# Patient Record
Sex: MALE | Employment: OTHER
[De-identification: names, ages, dates, MRNs, and addresses within clinical notes are randomized per-mention and may not be internally consistent; named-entity substitution may affect disease eponyms.]

---

## 2024-10-01 LAB — HBA1C MFR BLD: 9.3 % (ref ?–5.8)

## 2024-10-07 ENCOUNTER — OFFICE VISIT (OUTPATIENT)
Dept: MEDICAL GROUP | Facility: CLINIC | Age: 74
End: 2024-10-07
Payer: MEDICARE

## 2024-10-07 VITALS
WEIGHT: 175 LBS | SYSTOLIC BLOOD PRESSURE: 128 MMHG | DIASTOLIC BLOOD PRESSURE: 76 MMHG | BODY MASS INDEX: 25.05 KG/M2 | TEMPERATURE: 97.6 F | OXYGEN SATURATION: 96 % | HEART RATE: 71 BPM | HEIGHT: 70 IN

## 2024-10-07 DIAGNOSIS — Z12.11 SCREENING FOR COLORECTAL CANCER: ICD-10-CM

## 2024-10-07 DIAGNOSIS — Z12.12 SCREENING FOR COLORECTAL CANCER: ICD-10-CM

## 2024-10-07 DIAGNOSIS — I50.9 HEART FAILURE, UNSPECIFIED HF CHRONICITY, UNSPECIFIED HEART FAILURE TYPE (HCC): ICD-10-CM

## 2024-10-07 DIAGNOSIS — R97.20 ELEVATED PSA: ICD-10-CM

## 2024-10-07 DIAGNOSIS — L97.529 DIABETIC ULCER OF TOE OF LEFT FOOT ASSOCIATED WITH TYPE 2 DIABETES MELLITUS, UNSPECIFIED ULCER STAGE (HCC): ICD-10-CM

## 2024-10-07 DIAGNOSIS — Z13.228 SCREENING FOR METABOLIC DISORDER: ICD-10-CM

## 2024-10-07 DIAGNOSIS — E11.621 DIABETIC ULCER OF TOE OF LEFT FOOT ASSOCIATED WITH TYPE 2 DIABETES MELLITUS, UNSPECIFIED ULCER STAGE (HCC): ICD-10-CM

## 2024-10-07 DIAGNOSIS — R26.81 UNSTEADY GAIT: ICD-10-CM

## 2024-10-07 DIAGNOSIS — F33.9 RECURRENT MAJOR DEPRESSIVE DISORDER, REMISSION STATUS UNSPECIFIED (HCC): ICD-10-CM

## 2024-10-07 DIAGNOSIS — E08.65 DIABETES MELLITUS DUE TO UNDERLYING CONDITION, UNCONTROLLED, WITH HYPERGLYCEMIA (HCC): ICD-10-CM

## 2024-10-07 PROCEDURE — 99205 OFFICE O/P NEW HI 60 MIN: CPT

## 2024-10-07 PROCEDURE — 3078F DIAST BP <80 MM HG: CPT

## 2024-10-07 PROCEDURE — 3074F SYST BP LT 130 MM HG: CPT

## 2024-10-07 RX ORDER — SERTRALINE HYDROCHLORIDE 25 MG/1
25 TABLET, FILM COATED ORAL DAILY
Qty: 30 TABLET | Refills: 11 | Status: SHIPPED | OUTPATIENT
Start: 2024-10-07

## 2024-10-07 ASSESSMENT — PATIENT HEALTH QUESTIONNAIRE - PHQ9
5. POOR APPETITE OR OVEREATING: 1 - SEVERAL DAYS
CLINICAL INTERPRETATION OF PHQ2 SCORE: 3
SUM OF ALL RESPONSES TO PHQ QUESTIONS 1-9: 12

## 2024-10-08 ENCOUNTER — TELEPHONE (OUTPATIENT)
Dept: HEALTH INFORMATION MANAGEMENT | Facility: OTHER | Age: 74
End: 2024-10-08
Payer: MEDICARE

## 2024-10-10 ENCOUNTER — APPOINTMENT (OUTPATIENT)
Dept: MEDICAL GROUP | Facility: CLINIC | Age: 74
End: 2024-10-10
Payer: MEDICARE

## 2024-10-10 ENCOUNTER — HOME HEALTH ADMISSION (OUTPATIENT)
Dept: HOME HEALTH SERVICES | Facility: HOME HEALTHCARE | Age: 74
End: 2024-10-10
Payer: MEDICARE

## 2024-10-10 ENCOUNTER — TELEPHONE (OUTPATIENT)
Dept: CARDIOLOGY | Facility: MEDICAL CENTER | Age: 74
End: 2024-10-10

## 2024-10-10 DIAGNOSIS — I50.9 HEART FAILURE, UNSPECIFIED HF CHRONICITY, UNSPECIFIED HEART FAILURE TYPE (HCC): ICD-10-CM

## 2024-10-10 DIAGNOSIS — R26.81 UNSTEADY GAIT: ICD-10-CM

## 2024-10-11 ENCOUNTER — OFFICE VISIT (OUTPATIENT)
Dept: WOUND CARE | Facility: MEDICAL CENTER | Age: 74
End: 2024-10-11
Payer: MEDICARE

## 2024-10-11 ENCOUNTER — APPOINTMENT (OUTPATIENT)
Dept: CARDIOLOGY | Facility: MEDICAL CENTER | Age: 74
End: 2024-10-11
Payer: MEDICARE

## 2024-10-11 ENCOUNTER — HOSPITAL ENCOUNTER (OUTPATIENT)
Dept: RADIOLOGY | Facility: MEDICAL CENTER | Age: 74
End: 2024-10-11
Attending: NURSE PRACTITIONER
Payer: MEDICARE

## 2024-10-11 ENCOUNTER — HOSPITAL ENCOUNTER (OUTPATIENT)
Facility: MEDICAL CENTER | Age: 74
End: 2024-10-11
Attending: NURSE PRACTITIONER
Payer: MEDICARE

## 2024-10-11 VITALS
HEART RATE: 85 BPM | TEMPERATURE: 97.3 F | DIASTOLIC BLOOD PRESSURE: 60 MMHG | RESPIRATION RATE: 18 BRPM | SYSTOLIC BLOOD PRESSURE: 110 MMHG

## 2024-10-11 DIAGNOSIS — L97.522 DIABETIC ULCER OF TOE OF LEFT FOOT ASSOCIATED WITH TYPE 2 DIABETES MELLITUS, WITH FAT LAYER EXPOSED (HCC): Primary | ICD-10-CM

## 2024-10-11 DIAGNOSIS — T14.8XXA WOUND INFECTION: ICD-10-CM

## 2024-10-11 DIAGNOSIS — E11.621 DIABETIC ULCER OF TOE OF LEFT FOOT ASSOCIATED WITH TYPE 2 DIABETES MELLITUS, WITH FAT LAYER EXPOSED (HCC): ICD-10-CM

## 2024-10-11 DIAGNOSIS — E11.65 POORLY CONTROLLED TYPE 2 DIABETES MELLITUS WITH PERIPHERAL NEUROPATHY (HCC): ICD-10-CM

## 2024-10-11 DIAGNOSIS — E11.42 POORLY CONTROLLED TYPE 2 DIABETES MELLITUS WITH PERIPHERAL NEUROPATHY (HCC): ICD-10-CM

## 2024-10-11 DIAGNOSIS — E11.621 DIABETIC ULCER OF TOE OF LEFT FOOT ASSOCIATED WITH TYPE 2 DIABETES MELLITUS, WITH FAT LAYER EXPOSED (HCC): Primary | ICD-10-CM

## 2024-10-11 DIAGNOSIS — L97.522 DIABETIC ULCER OF TOE OF LEFT FOOT ASSOCIATED WITH TYPE 2 DIABETES MELLITUS, WITH FAT LAYER EXPOSED (HCC): ICD-10-CM

## 2024-10-11 DIAGNOSIS — L08.9 WOUND INFECTION: ICD-10-CM

## 2024-10-11 LAB
GRAM STN SPEC: NORMAL
SIGNIFICANT IND 70042: NORMAL
SITE SITE: NORMAL
SOURCE SOURCE: NORMAL

## 2024-10-11 PROCEDURE — 11042 DBRDMT SUBQ TIS 1ST 20SQCM/<: CPT

## 2024-10-11 PROCEDURE — 3074F SYST BP LT 130 MM HG: CPT | Performed by: NURSE PRACTITIONER

## 2024-10-11 PROCEDURE — 87077 CULTURE AEROBIC IDENTIFY: CPT

## 2024-10-11 PROCEDURE — 99215 OFFICE O/P EST HI 40 MIN: CPT | Mod: 25 | Performed by: NURSE PRACTITIONER

## 2024-10-11 PROCEDURE — 73660 X-RAY EXAM OF TOE(S): CPT | Mod: LT

## 2024-10-11 PROCEDURE — 99214 OFFICE O/P EST MOD 30 MIN: CPT

## 2024-10-11 PROCEDURE — 11042 DBRDMT SUBQ TIS 1ST 20SQCM/<: CPT | Performed by: NURSE PRACTITIONER

## 2024-10-11 PROCEDURE — 87070 CULTURE OTHR SPECIMN AEROBIC: CPT

## 2024-10-11 PROCEDURE — 87186 SC STD MICRODIL/AGAR DIL: CPT

## 2024-10-11 PROCEDURE — 3078F DIAST BP <80 MM HG: CPT | Performed by: NURSE PRACTITIONER

## 2024-10-11 PROCEDURE — 87205 SMEAR GRAM STAIN: CPT

## 2024-10-11 RX ORDER — ATORVASTATIN CALCIUM 20 MG/1
40 TABLET, FILM COATED ORAL DAILY
COMMUNITY
End: 2024-10-22 | Stop reason: SDUPTHER

## 2024-10-11 RX ORDER — GLIPIZIDE 2.5 MG/1
2.5 TABLET, EXTENDED RELEASE ORAL DAILY
COMMUNITY

## 2024-10-11 RX ORDER — FUROSEMIDE 40 MG/1
40 TABLET ORAL DAILY
COMMUNITY
End: 2024-10-22

## 2024-10-11 RX ORDER — PANTOPRAZOLE SODIUM 40 MG/1
40 TABLET, DELAYED RELEASE ORAL DAILY
COMMUNITY
End: 2024-10-22

## 2024-10-11 RX ORDER — CARVEDILOL 3.12 MG/1
3.12 TABLET ORAL 2 TIMES DAILY
COMMUNITY

## 2024-10-11 RX ORDER — LISINOPRIL 5 MG/1
2.5 TABLET ORAL DAILY
COMMUNITY
End: 2024-10-18

## 2024-10-11 RX ORDER — SENNOSIDES A AND B 8.6 MG/1
17.2 TABLET, FILM COATED ORAL DAILY
COMMUNITY

## 2024-10-12 ASSESSMENT — ENCOUNTER SYMPTOMS
SENSORY CHANGE: 1
MEMORY LOSS: 1
WEAKNESS: 1

## 2024-10-13 LAB
BACTERIA WND AEROBE CULT: ABNORMAL
BACTERIA WND AEROBE CULT: ABNORMAL
GRAM STN SPEC: ABNORMAL
SIGNIFICANT IND 70042: ABNORMAL
SITE SITE: ABNORMAL
SOURCE SOURCE: ABNORMAL

## 2024-10-14 ENCOUNTER — DOCUMENTATION (OUTPATIENT)
Dept: WOUND CARE | Facility: MEDICAL CENTER | Age: 74
End: 2024-10-14
Payer: MEDICARE

## 2024-10-14 DIAGNOSIS — L97.522 DIABETIC ULCER OF TOE OF LEFT FOOT ASSOCIATED WITH TYPE 2 DIABETES MELLITUS, WITH FAT LAYER EXPOSED (HCC): ICD-10-CM

## 2024-10-14 DIAGNOSIS — E11.42 POORLY CONTROLLED TYPE 2 DIABETES MELLITUS WITH PERIPHERAL NEUROPATHY (HCC): ICD-10-CM

## 2024-10-14 DIAGNOSIS — E11.621 DIABETIC ULCER OF TOE OF LEFT FOOT ASSOCIATED WITH TYPE 2 DIABETES MELLITUS, WITH FAT LAYER EXPOSED (HCC): ICD-10-CM

## 2024-10-14 DIAGNOSIS — T14.8XXA WOUND INFECTION: ICD-10-CM

## 2024-10-14 DIAGNOSIS — E11.65 POORLY CONTROLLED TYPE 2 DIABETES MELLITUS WITH PERIPHERAL NEUROPATHY (HCC): ICD-10-CM

## 2024-10-14 DIAGNOSIS — L08.9 WOUND INFECTION: ICD-10-CM

## 2024-10-15 ENCOUNTER — OFFICE VISIT (OUTPATIENT)
Dept: MEDICAL GROUP | Facility: CLINIC | Age: 74
End: 2024-10-15
Payer: MEDICARE

## 2024-10-15 ENCOUNTER — APPOINTMENT (OUTPATIENT)
Dept: WOUND CARE | Facility: MEDICAL CENTER | Age: 74
End: 2024-10-15
Payer: MEDICARE

## 2024-10-15 VITALS
BODY MASS INDEX: 23.77 KG/M2 | SYSTOLIC BLOOD PRESSURE: 122 MMHG | OXYGEN SATURATION: 94 % | HEART RATE: 67 BPM | DIASTOLIC BLOOD PRESSURE: 64 MMHG | TEMPERATURE: 98.1 F | WEIGHT: 166 LBS | HEIGHT: 70 IN | RESPIRATION RATE: 20 BRPM

## 2024-10-15 DIAGNOSIS — L97.529 DIABETIC ULCER OF TOE OF LEFT FOOT ASSOCIATED WITH TYPE 2 DIABETES MELLITUS, UNSPECIFIED ULCER STAGE (HCC): ICD-10-CM

## 2024-10-15 DIAGNOSIS — E11.42 POORLY CONTROLLED TYPE 2 DIABETES MELLITUS WITH PERIPHERAL NEUROPATHY (HCC): ICD-10-CM

## 2024-10-15 DIAGNOSIS — E11.65 POORLY CONTROLLED TYPE 2 DIABETES MELLITUS WITH PERIPHERAL NEUROPATHY (HCC): ICD-10-CM

## 2024-10-15 DIAGNOSIS — Z23 NEED FOR VACCINATION: ICD-10-CM

## 2024-10-15 DIAGNOSIS — R97.20 ELEVATED PSA: ICD-10-CM

## 2024-10-15 DIAGNOSIS — E11.621 DIABETIC ULCER OF TOE OF LEFT FOOT ASSOCIATED WITH TYPE 2 DIABETES MELLITUS, UNSPECIFIED ULCER STAGE (HCC): ICD-10-CM

## 2024-10-15 DIAGNOSIS — I50.9 HEART FAILURE, UNSPECIFIED HF CHRONICITY, UNSPECIFIED HEART FAILURE TYPE (HCC): ICD-10-CM

## 2024-10-15 PROCEDURE — 3078F DIAST BP <80 MM HG: CPT

## 2024-10-15 PROCEDURE — 3074F SYST BP LT 130 MM HG: CPT

## 2024-10-15 PROCEDURE — 99213 OFFICE O/P EST LOW 20 MIN: CPT | Mod: 25

## 2024-10-15 PROCEDURE — G0008 ADMIN INFLUENZA VIRUS VAC: HCPCS

## 2024-10-15 PROCEDURE — 90662 IIV NO PRSV INCREASED AG IM: CPT

## 2024-10-15 RX ORDER — LANCETS 30 GAUGE
EACH MISCELLANEOUS
Qty: 100 EACH | Refills: 0 | Status: SHIPPED | OUTPATIENT
Start: 2024-10-15

## 2024-10-15 RX ORDER — GLUCOSAMINE HCL/CHONDROITIN SU 500-400 MG
CAPSULE ORAL
Qty: 100 EACH | Refills: 2 | Status: SHIPPED | OUTPATIENT
Start: 2024-10-15

## 2024-10-17 ENCOUNTER — OFFICE VISIT (OUTPATIENT)
Dept: WOUND CARE | Facility: MEDICAL CENTER | Age: 74
End: 2024-10-17
Payer: MEDICARE

## 2024-10-17 VITALS
DIASTOLIC BLOOD PRESSURE: 60 MMHG | SYSTOLIC BLOOD PRESSURE: 120 MMHG | HEART RATE: 61 BPM | RESPIRATION RATE: 16 BRPM | TEMPERATURE: 96.5 F

## 2024-10-17 DIAGNOSIS — E11.42 POORLY CONTROLLED TYPE 2 DIABETES MELLITUS WITH PERIPHERAL NEUROPATHY (HCC): ICD-10-CM

## 2024-10-17 DIAGNOSIS — L97.522 DIABETIC ULCER OF TOE OF LEFT FOOT ASSOCIATED WITH TYPE 2 DIABETES MELLITUS, WITH FAT LAYER EXPOSED (HCC): ICD-10-CM

## 2024-10-17 DIAGNOSIS — L08.9 WOUND INFECTION: ICD-10-CM

## 2024-10-17 DIAGNOSIS — E11.621 DIABETIC ULCER OF TOE OF LEFT FOOT ASSOCIATED WITH TYPE 2 DIABETES MELLITUS, WITH FAT LAYER EXPOSED (HCC): ICD-10-CM

## 2024-10-17 DIAGNOSIS — T14.8XXA WOUND INFECTION: ICD-10-CM

## 2024-10-17 DIAGNOSIS — E11.65 POORLY CONTROLLED TYPE 2 DIABETES MELLITUS WITH PERIPHERAL NEUROPATHY (HCC): ICD-10-CM

## 2024-10-17 PROCEDURE — 11042 DBRDMT SUBQ TIS 1ST 20SQCM/<: CPT | Performed by: NURSE PRACTITIONER

## 2024-10-17 PROCEDURE — 3078F DIAST BP <80 MM HG: CPT | Performed by: NURSE PRACTITIONER

## 2024-10-17 PROCEDURE — 11042 DBRDMT SUBQ TIS 1ST 20SQCM/<: CPT

## 2024-10-17 PROCEDURE — 3074F SYST BP LT 130 MM HG: CPT | Performed by: NURSE PRACTITIONER

## 2024-10-17 PROCEDURE — 99213 OFFICE O/P EST LOW 20 MIN: CPT | Mod: 25 | Performed by: NURSE PRACTITIONER

## 2024-10-17 PROCEDURE — 99213 OFFICE O/P EST LOW 20 MIN: CPT

## 2024-10-18 ENCOUNTER — OFFICE VISIT (OUTPATIENT)
Dept: CARDIOLOGY | Facility: MEDICAL CENTER | Age: 74
End: 2024-10-18
Payer: MEDICARE

## 2024-10-18 ENCOUNTER — APPOINTMENT (OUTPATIENT)
Dept: WOUND CARE | Facility: MEDICAL CENTER | Age: 74
End: 2024-10-18
Payer: MEDICARE

## 2024-10-18 ENCOUNTER — TELEPHONE (OUTPATIENT)
Dept: CARDIOLOGY | Facility: MEDICAL CENTER | Age: 74
End: 2024-10-18

## 2024-10-18 VITALS
SYSTOLIC BLOOD PRESSURE: 132 MMHG | BODY MASS INDEX: 23.54 KG/M2 | RESPIRATION RATE: 16 BRPM | WEIGHT: 164.4 LBS | HEART RATE: 65 BPM | OXYGEN SATURATION: 92 % | HEIGHT: 70 IN | DIASTOLIC BLOOD PRESSURE: 64 MMHG

## 2024-10-18 DIAGNOSIS — E11.65 POORLY CONTROLLED TYPE 2 DIABETES MELLITUS WITH PERIPHERAL NEUROPATHY (HCC): ICD-10-CM

## 2024-10-18 DIAGNOSIS — E11.42 POORLY CONTROLLED TYPE 2 DIABETES MELLITUS WITH PERIPHERAL NEUROPATHY (HCC): ICD-10-CM

## 2024-10-18 DIAGNOSIS — E08.65 DIABETES MELLITUS DUE TO UNDERLYING CONDITION, UNCONTROLLED, WITH HYPERGLYCEMIA (HCC): ICD-10-CM

## 2024-10-18 DIAGNOSIS — E11.621 DIABETIC ULCER OF TOE OF LEFT FOOT ASSOCIATED WITH TYPE 2 DIABETES MELLITUS, UNSPECIFIED ULCER STAGE (HCC): ICD-10-CM

## 2024-10-18 DIAGNOSIS — E78.5 DYSLIPIDEMIA: ICD-10-CM

## 2024-10-18 DIAGNOSIS — I50.9 HEART FAILURE, UNSPECIFIED HF CHRONICITY, UNSPECIFIED HEART FAILURE TYPE (HCC): ICD-10-CM

## 2024-10-18 DIAGNOSIS — L97.529 DIABETIC ULCER OF TOE OF LEFT FOOT ASSOCIATED WITH TYPE 2 DIABETES MELLITUS, UNSPECIFIED ULCER STAGE (HCC): ICD-10-CM

## 2024-10-18 DIAGNOSIS — I10 PRIMARY HYPERTENSION: ICD-10-CM

## 2024-10-18 DIAGNOSIS — I48.0 PAROXYSMAL ATRIAL FIBRILLATION (HCC): ICD-10-CM

## 2024-10-18 PROBLEM — I50.22 CHRONIC SYSTOLIC CONGESTIVE HEART FAILURE (HCC): Status: ACTIVE | Noted: 2024-10-18

## 2024-10-18 PROCEDURE — 3075F SYST BP GE 130 - 139MM HG: CPT | Performed by: INTERNAL MEDICINE

## 2024-10-18 PROCEDURE — 3078F DIAST BP <80 MM HG: CPT | Performed by: INTERNAL MEDICINE

## 2024-10-18 PROCEDURE — G2211 COMPLEX E/M VISIT ADD ON: HCPCS | Performed by: INTERNAL MEDICINE

## 2024-10-18 PROCEDURE — 93005 ELECTROCARDIOGRAM TRACING: CPT | Performed by: INTERNAL MEDICINE

## 2024-10-18 PROCEDURE — 99213 OFFICE O/P EST LOW 20 MIN: CPT | Performed by: INTERNAL MEDICINE

## 2024-10-18 PROCEDURE — 99204 OFFICE O/P NEW MOD 45 MIN: CPT | Performed by: INTERNAL MEDICINE

## 2024-10-18 RX ORDER — LOSARTAN POTASSIUM 25 MG/1
25 TABLET ORAL DAILY
Qty: 90 TABLET | Refills: 3 | Status: SHIPPED | OUTPATIENT
Start: 2024-10-18

## 2024-10-18 RX ORDER — CLINDAMYCIN HYDROCHLORIDE 150 MG/1
150 CAPSULE ORAL 3 TIMES DAILY
COMMUNITY

## 2024-10-18 RX ORDER — HYDROCODONE BITARTRATE AND ACETAMINOPHEN 5; 325 MG/1; MG/1
1 TABLET ORAL EVERY 4 HOURS PRN
COMMUNITY

## 2024-10-18 RX ORDER — INSULIN GLARGINE 100 [IU]/ML
10 INJECTION, SOLUTION SUBCUTANEOUS EVERY EVENING
Qty: 9 ML | Refills: 1 | Status: SHIPPED | OUTPATIENT
Start: 2024-10-18 | End: 2025-04-16

## 2024-10-18 RX ORDER — SPIRONOLACTONE 25 MG/1
25 TABLET ORAL DAILY
Qty: 90 TABLET | Refills: 3 | Status: SHIPPED | OUTPATIENT
Start: 2024-10-18

## 2024-10-18 ASSESSMENT — ENCOUNTER SYMPTOMS
DEPRESSION: 1
FEVER: 0
DIZZINESS: 1
BLURRED VISION: 1
PALPITATIONS: 0
MYALGIAS: 0
CHILLS: 0
CLAUDICATION: 1
POLYDIPSIA: 1
ABDOMINAL PAIN: 0
DOUBLE VISION: 0
FOCAL WEAKNESS: 1
HEADACHES: 1
BRUISES/BLEEDS EASILY: 1
WEAKNESS: 0
INSOMNIA: 1
CONSTIPATION: 1
VOMITING: 0
COUGH: 1
BLOOD IN STOOL: 1
SHORTNESS OF BREATH: 1
BACK PAIN: 1
WEIGHT LOSS: 0
PHOTOPHOBIA: 1
DIARRHEA: 1
NAUSEA: 0
NERVOUS/ANXIOUS: 1
MEMORY LOSS: 1
FALLS: 1

## 2024-10-19 ENCOUNTER — PATIENT MESSAGE (OUTPATIENT)
Dept: CARDIOLOGY | Facility: MEDICAL CENTER | Age: 74
End: 2024-10-19
Payer: MEDICARE

## 2024-10-21 ENCOUNTER — PATIENT MESSAGE (OUTPATIENT)
Dept: CARDIOLOGY | Facility: MEDICAL CENTER | Age: 74
End: 2024-10-21
Payer: MEDICARE

## 2024-10-21 ENCOUNTER — TELEPHONE (OUTPATIENT)
Dept: MEDICAL GROUP | Facility: CLINIC | Age: 74
End: 2024-10-21
Payer: MEDICARE

## 2024-10-21 DIAGNOSIS — I48.0 PAROXYSMAL ATRIAL FIBRILLATION (HCC): ICD-10-CM

## 2024-10-21 DIAGNOSIS — I50.9 HEART FAILURE, UNSPECIFIED HF CHRONICITY, UNSPECIFIED HEART FAILURE TYPE (HCC): ICD-10-CM

## 2024-10-21 DIAGNOSIS — I10 PRIMARY HYPERTENSION: ICD-10-CM

## 2024-10-21 LAB — EKG IMPRESSION: NORMAL

## 2024-10-21 PROCEDURE — 93010 ELECTROCARDIOGRAM REPORT: CPT | Performed by: INTERNAL MEDICINE

## 2024-10-22 ENCOUNTER — OFFICE VISIT (OUTPATIENT)
Dept: MEDICAL GROUP | Facility: CLINIC | Age: 74
End: 2024-10-22
Payer: MEDICARE

## 2024-10-22 VITALS
RESPIRATION RATE: 16 BRPM | HEART RATE: 59 BPM | DIASTOLIC BLOOD PRESSURE: 72 MMHG | BODY MASS INDEX: 22.38 KG/M2 | SYSTOLIC BLOOD PRESSURE: 115 MMHG | WEIGHT: 156 LBS | OXYGEN SATURATION: 95 % | TEMPERATURE: 97.5 F

## 2024-10-22 DIAGNOSIS — Z79.4 TYPE 2 DIABETES MELLITUS WITH OTHER SPECIFIED COMPLICATION, WITH LONG-TERM CURRENT USE OF INSULIN (HCC): ICD-10-CM

## 2024-10-22 DIAGNOSIS — E11.65 POORLY CONTROLLED TYPE 2 DIABETES MELLITUS WITH PERIPHERAL NEUROPATHY (HCC): ICD-10-CM

## 2024-10-22 DIAGNOSIS — I50.22 CHRONIC SYSTOLIC CONGESTIVE HEART FAILURE (HCC): ICD-10-CM

## 2024-10-22 DIAGNOSIS — E11.42 POORLY CONTROLLED TYPE 2 DIABETES MELLITUS WITH PERIPHERAL NEUROPATHY (HCC): ICD-10-CM

## 2024-10-22 DIAGNOSIS — E11.69 TYPE 2 DIABETES MELLITUS WITH OTHER SPECIFIED COMPLICATION, WITH LONG-TERM CURRENT USE OF INSULIN (HCC): ICD-10-CM

## 2024-10-22 DIAGNOSIS — R26.81 UNSTEADY GAIT: ICD-10-CM

## 2024-10-22 PROCEDURE — 3078F DIAST BP <80 MM HG: CPT

## 2024-10-22 PROCEDURE — 3074F SYST BP LT 130 MM HG: CPT

## 2024-10-22 PROCEDURE — 99213 OFFICE O/P EST LOW 20 MIN: CPT

## 2024-10-22 RX ORDER — BLOOD-GLUCOSE SENSOR
1 EACH MISCELLANEOUS
Qty: 2 EACH | Refills: 5 | Status: SHIPPED | OUTPATIENT
Start: 2024-10-22 | End: 2024-11-19

## 2024-10-22 RX ORDER — KETOROLAC TROMETHAMINE 30 MG/ML
1 INJECTION, SOLUTION INTRAMUSCULAR; INTRAVENOUS DAILY
Qty: 1 EACH | Refills: 0 | Status: SHIPPED | OUTPATIENT
Start: 2024-10-22

## 2024-10-22 RX ORDER — ATORVASTATIN CALCIUM 40 MG/1
40 TABLET, FILM COATED ORAL DAILY
Qty: 90 TABLET | Refills: 3 | Status: SHIPPED | OUTPATIENT
Start: 2024-10-22

## 2024-10-23 ENCOUNTER — APPOINTMENT (OUTPATIENT)
Dept: WOUND CARE | Facility: MEDICAL CENTER | Age: 74
End: 2024-10-23
Payer: MEDICARE

## 2024-10-23 ENCOUNTER — TELEPHONE (OUTPATIENT)
Dept: HEALTH INFORMATION MANAGEMENT | Facility: OTHER | Age: 74
End: 2024-10-23
Payer: MEDICARE

## 2024-10-24 ENCOUNTER — OFFICE VISIT (OUTPATIENT)
Dept: WOUND CARE | Facility: MEDICAL CENTER | Age: 74
End: 2024-10-24
Payer: MEDICARE

## 2024-10-24 ENCOUNTER — HOSPITAL ENCOUNTER (OUTPATIENT)
Dept: RADIOLOGY | Facility: MEDICAL CENTER | Age: 74
End: 2024-10-24
Attending: INTERNAL MEDICINE
Payer: MEDICARE

## 2024-10-24 VITALS
DIASTOLIC BLOOD PRESSURE: 78 MMHG | RESPIRATION RATE: 18 BRPM | SYSTOLIC BLOOD PRESSURE: 118 MMHG | TEMPERATURE: 96.7 F | HEART RATE: 67 BPM

## 2024-10-24 DIAGNOSIS — L08.9 WOUND INFECTION: ICD-10-CM

## 2024-10-24 DIAGNOSIS — I10 PRIMARY HYPERTENSION: ICD-10-CM

## 2024-10-24 DIAGNOSIS — L97.522 DIABETIC ULCER OF TOE OF LEFT FOOT ASSOCIATED WITH TYPE 2 DIABETES MELLITUS, WITH FAT LAYER EXPOSED (HCC): ICD-10-CM

## 2024-10-24 DIAGNOSIS — E11.42 POORLY CONTROLLED TYPE 2 DIABETES MELLITUS WITH PERIPHERAL NEUROPATHY (HCC): ICD-10-CM

## 2024-10-24 DIAGNOSIS — E11.65 POORLY CONTROLLED TYPE 2 DIABETES MELLITUS WITH PERIPHERAL NEUROPATHY (HCC): ICD-10-CM

## 2024-10-24 DIAGNOSIS — I50.9 HEART FAILURE, UNSPECIFIED HF CHRONICITY, UNSPECIFIED HEART FAILURE TYPE (HCC): ICD-10-CM

## 2024-10-24 DIAGNOSIS — T14.8XXA WOUND INFECTION: ICD-10-CM

## 2024-10-24 DIAGNOSIS — E11.621 DIABETIC ULCER OF TOE OF LEFT FOOT ASSOCIATED WITH TYPE 2 DIABETES MELLITUS, WITH FAT LAYER EXPOSED (HCC): ICD-10-CM

## 2024-10-24 DIAGNOSIS — I48.0 PAROXYSMAL ATRIAL FIBRILLATION (HCC): ICD-10-CM

## 2024-10-24 PROCEDURE — 11042 DBRDMT SUBQ TIS 1ST 20SQCM/<: CPT | Performed by: NURSE PRACTITIONER

## 2024-10-24 PROCEDURE — 3078F DIAST BP <80 MM HG: CPT | Performed by: NURSE PRACTITIONER

## 2024-10-24 PROCEDURE — 78452 HT MUSCLE IMAGE SPECT MULT: CPT | Mod: 26 | Performed by: INTERNAL MEDICINE

## 2024-10-24 PROCEDURE — 3074F SYST BP LT 130 MM HG: CPT | Performed by: NURSE PRACTITIONER

## 2024-10-24 PROCEDURE — 99213 OFFICE O/P EST LOW 20 MIN: CPT

## 2024-10-24 PROCEDURE — A9502 TC99M TETROFOSMIN: HCPCS

## 2024-10-24 PROCEDURE — 93018 CV STRESS TEST I&R ONLY: CPT | Performed by: INTERNAL MEDICINE

## 2024-10-24 PROCEDURE — 700111 HCHG RX REV CODE 636 W/ 250 OVERRIDE (IP): Performed by: INTERNAL MEDICINE

## 2024-10-24 PROCEDURE — 11042 DBRDMT SUBQ TIS 1ST 20SQCM/<: CPT

## 2024-10-24 RX ORDER — REGADENOSON 0.08 MG/ML
0.4 INJECTION, SOLUTION INTRAVENOUS ONCE
Status: COMPLETED | OUTPATIENT
Start: 2024-10-24 | End: 2024-10-24

## 2024-10-24 RX ORDER — AMINOPHYLLINE 25 MG/ML
100 INJECTION, SOLUTION INTRAVENOUS
Status: DISCONTINUED | OUTPATIENT
Start: 2024-10-24 | End: 2024-10-25 | Stop reason: HOSPADM

## 2024-10-24 RX ADMIN — REGADENOSON 0.4 MG: 0.08 INJECTION, SOLUTION INTRAVENOUS at 09:30

## 2024-10-25 ENCOUNTER — HOSPITAL ENCOUNTER (OUTPATIENT)
Dept: CARDIOLOGY | Facility: MEDICAL CENTER | Age: 74
End: 2024-10-25
Attending: INTERNAL MEDICINE
Payer: MEDICARE

## 2024-10-25 DIAGNOSIS — I50.9 HEART FAILURE, UNSPECIFIED HF CHRONICITY, UNSPECIFIED HEART FAILURE TYPE (HCC): ICD-10-CM

## 2024-10-25 DIAGNOSIS — I48.0 PAROXYSMAL ATRIAL FIBRILLATION (HCC): ICD-10-CM

## 2024-10-25 DIAGNOSIS — I10 PRIMARY HYPERTENSION: ICD-10-CM

## 2024-10-25 PROCEDURE — 93306 TTE W/DOPPLER COMPLETE: CPT

## 2024-10-26 LAB
LV EJECT FRACT  99904: 60
LV EJECT FRACT MOD 2C 99903: 62.67
LV EJECT FRACT MOD 4C 99902: 54.53
LV EJECT FRACT MOD BP 99901: 59.59

## 2024-10-26 PROCEDURE — 93306 TTE W/DOPPLER COMPLETE: CPT | Mod: 26 | Performed by: INTERNAL MEDICINE

## 2024-10-28 ENCOUNTER — TELEPHONE (OUTPATIENT)
Dept: CARDIOLOGY | Facility: MEDICAL CENTER | Age: 74
End: 2024-10-28
Payer: MEDICARE

## 2024-10-29 ENCOUNTER — HOSPITAL ENCOUNTER (OUTPATIENT)
Dept: LAB | Facility: MEDICAL CENTER | Age: 74
End: 2024-10-29
Attending: UROLOGY
Payer: MEDICARE

## 2024-10-29 PROCEDURE — 82565 ASSAY OF CREATININE: CPT

## 2024-10-29 PROCEDURE — 84153 ASSAY OF PSA TOTAL: CPT

## 2024-10-29 PROCEDURE — 84520 ASSAY OF UREA NITROGEN: CPT

## 2024-10-29 PROCEDURE — 36415 COLL VENOUS BLD VENIPUNCTURE: CPT

## 2024-10-30 LAB
BUN SERPL-MCNC: 36 MG/DL (ref 8–22)
CREAT SERPL-MCNC: 0.71 MG/DL (ref 0.5–1.4)
GFR SERPLBLD CREATININE-BSD FMLA CKD-EPI: 97 ML/MIN/1.73 M 2
PSA SERPL-MCNC: 12.9 NG/ML (ref 0–4)

## 2024-10-31 ENCOUNTER — OFFICE VISIT (OUTPATIENT)
Dept: WOUND CARE | Facility: MEDICAL CENTER | Age: 74
End: 2024-10-31
Payer: MEDICARE

## 2024-10-31 VITALS
DIASTOLIC BLOOD PRESSURE: 52 MMHG | SYSTOLIC BLOOD PRESSURE: 94 MMHG | HEART RATE: 64 BPM | OXYGEN SATURATION: 98 % | TEMPERATURE: 97.2 F | RESPIRATION RATE: 14 BRPM

## 2024-10-31 DIAGNOSIS — E11.65 POORLY CONTROLLED TYPE 2 DIABETES MELLITUS WITH PERIPHERAL NEUROPATHY (HCC): ICD-10-CM

## 2024-10-31 DIAGNOSIS — L08.9 WOUND INFECTION: ICD-10-CM

## 2024-10-31 DIAGNOSIS — T14.8XXA WOUND INFECTION: ICD-10-CM

## 2024-10-31 DIAGNOSIS — E11.42 POORLY CONTROLLED TYPE 2 DIABETES MELLITUS WITH PERIPHERAL NEUROPATHY (HCC): ICD-10-CM

## 2024-10-31 DIAGNOSIS — E11.621 DIABETIC ULCER OF TOE OF LEFT FOOT ASSOCIATED WITH TYPE 2 DIABETES MELLITUS, WITH FAT LAYER EXPOSED (HCC): Primary | ICD-10-CM

## 2024-10-31 DIAGNOSIS — L97.522 DIABETIC ULCER OF TOE OF LEFT FOOT ASSOCIATED WITH TYPE 2 DIABETES MELLITUS, WITH FAT LAYER EXPOSED (HCC): Primary | ICD-10-CM

## 2024-10-31 PROCEDURE — 3078F DIAST BP <80 MM HG: CPT | Performed by: NURSE PRACTITIONER

## 2024-10-31 PROCEDURE — 3074F SYST BP LT 130 MM HG: CPT | Performed by: NURSE PRACTITIONER

## 2024-10-31 PROCEDURE — 99213 OFFICE O/P EST LOW 20 MIN: CPT | Performed by: NURSE PRACTITIONER

## 2024-10-31 PROCEDURE — 11042 DBRDMT SUBQ TIS 1ST 20SQCM/<: CPT

## 2024-10-31 NOTE — PROGRESS NOTES
Provider Encounter- Diabetic Foot Ulcer      HISTORY OF PRESENT ILLNESS  Wound History:    START OF CARE IN CLINIC: 10/11/24    REFERRING PROVIDER: Narciso Rose MD     WOUND- Diabetic foot ulcer   LOCATION: Left great toe plantar     HISTORY: Patient was hospitalized in [facility] where he lives from 9/20/2024 - 9/30/2024 for sepsis and cellulitis to left thigh.  He was noted to have a left great toe ulcer during hospitalization.  Patient reports he did notice the ulcer initially around June 2024.  Did not perform any home care.  While hospitalized he was placed on IV antibiotics.  He did not have any imaging of the left foot per daughter.  He did have an ultrasound to rule out DVT which was negative.  He was discharged on 5 days of amoxicillin 500 mg 3 times daily which patient completed.  Was not seen by wound specialist while hospitalized.  Photo of wound from daughter shows eschar and callus on 10/5/2024.  Patient has moved to [place] and is now living with his daughter.  He has established with a new PCP, and has been referred to several specialists, including wound care.          Pertinent Medical History: DM, CHF     DIABETES HX: Diagnosed with type 2 diabetes in 2012, and is currently managing diabetes with orals.  Patient started taking diabetic medications and checking his blood sugars beginning of October 2024.    Checks blood sugars: 2x/day   Blood sugar average: 190-330   Has had previous diabetes education.    Does have numbness in feet.    Foot wear:nonprescriptive shoes.  Does not check feet routinely.    Previous foot ulcers: No   Previous foot surgery:no  Current occupation retired .    Offloading none.           TOBACCO USE:   Never.  Exposed to passive smoke  No history of heavy alcoholism, back surgeries, chemotherapy  Patient's problem list, allergies, and current medications reviewed and updated in Epic    Interval History:  10/11/2024: Initial wound evaluation, initial provider  Clinic visit with STEVE Qiu, KENNETH SHANNON.  Pt denies fevers, chills, nausea, vomiting.  Accompanied by daughter.  Moved from [facility] on 10/6/2024.To be established with home health for wound care, PT, OT, speech therapy.  Authorized for [facility].  Referral to pharmacal therapist placed.    10/17/2024 : Clinic visit with SAM Lozoya, STEVE, KENNETH PEREZ.  Patient is here today with one of his daughters.  He states he is feeling well.  Blood sugar today 171.  Daughter checked his lab results on her phone, A1c on 9/30 was 9.3%.  Will add to results in epic.  X-ray of toe completed last week, shows no definitive evidence of osteomyelitis.   Home health referral for wound care placed last visit was declined.  Per referral note, he was referred to [facility], but declined because he is already established with healthy living at home.  However, he is only receiving PT/OT services from [facility] at this time.  I reordered [facility] with specification that he be referred to [facility] for wound care.   Per daughter, [facility] did not have offloading shoe available.  I provided a prescription to , but also directed them to an offloading insert they can purchase off of [employer] if unable to obtain offloading shoe from orthotics.    10/24/2024 : Clinic visit with SAM Lozoya, TSEVE, KENNETH PEREZ.   Patient is here today with his daughter.  His wound measures about the same.  He was not able to obtain offloading shoe due to cost.  He has home PT recommended a different type of offloading shoe that he could purchase off the area that.  Daughter showed me example on her cell phone.  I agreed that this would be appropriate.  Patient normally wears slippers at home, and therefore offloading insert would not be appropriate.   Patient now has home health nursing, through healthy living at home.  His blood sugar today was 208.  He  commonly runs between 160 and 200.  Recently seen by his PCP, he is now on insulin.    10/31/2024: Clinic visit with Lara VARGAS, ORTIZP-BC, CWON, CFCN.  Pt denies fevers, chills, nausea, vomiting.  Blood sugar 155.  Wound is decreased in area.  Accompanied by daughter.      REVIEW OF SYSTEMS:   Unchanged from previous clinic visit on 10/24/2024, except as documented in interval history above    PHYSICAL EXAMINATION:   BP 94/52   Pulse 64   Temp 36.2 °C (97.2 °F) (Temporal)   Resp 14   SpO2 98%     Physical Exam  Vitals reviewed.   HENT:      Ears:      Comments: Hard of hearing     Mouth/Throat:      Comments: Missing teeth  Cardiovascular:      Rate and Rhythm: Normal rate.      Pulses: Normal pulses.      Comments: Left DP and PT pulses easily palpated, +2.  Foot warm  Pulmonary:      Effort: Pulmonary effort is normal.   Abdominal:      Palpations: Abdomen is soft.   Musculoskeletal:         General: Swelling present.      Comments: Localized swelling to left hallux   Skin:     General: Skin is warm.      Comments: Left plantar great toe ulcer: Full-thickness.  Area decreased, undermining distally   Minimalserous drainage, no odor.  No periwound erythema or induration   Neurological:      Mental Status: He is alert and oriented to person, place, and time.      Motor: Weakness present.      Comments: Feet are insensate.  Neuropathy extends from toes to knee bilaterally  - With monofilament sensed 0 out of 10 to left foot  -With monofilament sensed 1 out of 10 to right foot    Generalized weakness   Psychiatric:         Mood and Affect: Mood and affect normal.         Cognition and Memory: Memory normal.      Comments: Forgetful         Monofilament testing with a 10 gram force: sensation intact: decreased bilaterally  Visual Inspection: Feet with maceration, ulcers, fissures.  Pedal pulses: intact bilaterally     WOUND ASSESSMENT  Wound 10/11/24 Diabetic Ulcer Toe, Hallux Plantar Left (Active)    Wound Image    10/31/24 1021   Site Assessment Pink;Yellow 10/31/24 1021   Periwound Assessment Callused 10/31/24 1021   Margins Epibole (rolled edges);Unattached edges 10/31/24 1021   Drainage Amount Moderate 10/31/24 1021   Drainage Description Thick;Sanguineous;Cloudy/turbid 10/31/24 1021   Treatments Cleansed;Provider debridement;Site care 10/31/24 1021   Offloading/DME Other (comment) 10/24/24 1500   Wound Cleansing Hypochlorus Acid 10/31/24 1021   Periwound Protectant No-sting Skin Prep;Skin Moisturizer 10/31/24 1021   Dressing Changed Changed 10/24/24 1500   Dressing Cleansing/Solutions Not Applicable 10/31/24 1021   Dressing Options Hydrofiber Silver Strip;Nonadhesive Foam;Hypafix Tape;Komprex Horseshoe;Tubigrip 10/31/24 1021   Dressing Change/Treatment Frequency Every 72 hrs, and As Needed 10/31/24 1021   Wound Team Following Weekly 10/24/24 1500   Non-staged Wound Description Full thickness 10/31/24 1021   Wound Length (cm) 0.4 cm 10/31/24 1021   Wound Width (cm) 1.1 cm 10/31/24 1021   Wound Depth (cm) 0.5 cm 10/31/24 1021   Wound Surface Area (cm^2) 0.44 cm^2 10/31/24 1021   Wound Volume (cm^3) 0.22 cm^3 10/31/24 1021   Post-Procedure Length (cm) 0.5 cm 10/31/24 1021   Post-Procedure Width (cm) 1.2 cm 10/31/24 1021   Post-Procedure Depth (cm) 0.5 cm 10/31/24 1021   Post-Procedure Surface Area (cm^2) 0.6 cm^2 10/31/24 1021   Post-Procedure Volume (cm^3) 0.3 cm^3 10/31/24 1021   Wound Healing % -53 10/31/24 1021   Tunneling (cm) 0 cm 10/24/24 1500   Undermining (cm) 0.2 cm 10/31/24 1021   Undermining of Wound, 1st Location From 4 o'clock;To 8 o'clock 10/31/24 1021   Wound Odor None 10/31/24 1021   Pulses Left;DP;2+ 10/17/24 1100   Right Foot Monofilament 10-point exam (Sensate) 1/10 10/11/24 0900   Left Foot Monofilament 10-point exam (Sensate) 0/10 10/11/24 0900   Exposed Structures None 10/31/24 1021   Number of days: 23         PROCEDURE:   -2% viscous lidocaine applied topically to wound bed for  approximately 5 minutes prior to debridement  -Curette used to debride wound bed, closed wound edges and periwound callus.  Excisional debridement was performed to remove devitalized tissue until healthy, bleeding tissue was visualized.   Entire surface of wound, 0.6 cm² debrided.  Tissue debrided into the subcutaneous layer.  Periwound callus, 1 cm², debrided to skin level, excising hyperkeratinized tissue.   -Bleeding controlled with manual pressure and silver nitrate.    -Wound care completed by wound RN, refer to flowsheet  -Patient tolerated the procedure well, without c/o pain or discomfort.       Pertinent Labs and Diagnostics:         Labs: See media tab    A1c:   Lab Results   Component Value Date/Time    HBA1C 9.3 (A) 09/30/2024 12:00 AM          IMAGING:   10/11/2024-x-ray of toes, left  FINDINGS:  There is soft tissue irregularity involving the plantar aspect of the great toe with high density material in this region. There is no definite fracture or dislocation. Mild changes of osteoarthrosis are noted.     IMPRESSION:     Plantar ulcer involving the great toe without definite radiographic evidence for osteomyelitis      VASCULAR STUDIES: No results found.    LAST  WOUND CULTURE:   Lab Results   Component Value Date/Time    CULTRSULT - (A) 10/11/2024 09:30 AM    CULTRSULT Staphylococcus aureus  Light growth   (A) 10/11/2024 09:30 AM           ASSESSMENT AND PLAN:     1. Diabetic ulcer of toe of left foot associated with type 2 diabetes mellitus, with fat layer exposed (HCC)    10/31/2024: Wound area improved.  Undermining distally.  - Excisional debridement performed today.  Medically necessary to promote wound healing.  -Patient to follow-up at wound care clinic weekly for assessment and debridement  - Home health healthy living orders updated.  Also seen healthy living for PT OT  - Pedal pulses are palpable, indicating that he likely has adequate perfusion to heal this wound.  - X-ray was completed  last week, shows no evidence of osteomyelitis  -add horseshoe foam to further offload  - add tubigrip    Wound care:Aquacel Ag, nonadhesive foam, Hypafix tape, horseshoe felt, Tubigrip      2. Poorly controlled type 2 diabetes mellitus with peripheral neuropathy (HCC)    10/31/2024: Patient's blood sugar 155.  Continues to improve as he is now on insulin managed by PCP.  - Referral to diabetes pharmacotherapy placed on previous visit.  Has upcoming visit  - Patient was not able to obtain offloading shoe through orthotics, too expensive.  Alternative option recommended by home PT.  Daughter was able to purchase postop shoe.  Recommend Darco offloading peg assist insert to further relieve pressure to the great toe.  Daughter agreeable.  Examples discussed  -Patient states he is eating well, with emphasis on protein  - Patient advised to keep his blood sugars below 140 in order to optimize wound healing  -Referral to podiatry placed for routine foot nail care    3. Wound infection    10/31/2024: His wound does not appear to be infected today  -Monitor for signs and symptoms of infection at each visit          PATIENT EDUCATION  - Importance of tight glucose control for wound healing   - Implications of loss of protective sensation (LOPS) discussed with patient- including increased risk for amputation.  - Advised to check feet at least daily, moisturize feet, and to always wear protective foot wear.   -  Importance of offloading foot to assist with wound healing  - Advised pt not to trim nails or calluses, seek foot/nail care from podiatrist or certified foot/nail nurse  - Importance of adequate nutrition for wound healing      My total time spent caring for the patient on the day of the encounter was 20 minutes, reviewing history, assessment, counseling and education, and coordination of care.  This does not include time spent on separately billable procedures/tests.      Please note that this note may have been  created using voice recognition software. I have worked with technical experts from [facility] to optimize the interface.  I have made every reasonable attempt to correct obvious errors, but there may be errors of grammar and possibly content that I did not discover before finalizing the note.    N

## 2024-10-31 NOTE — PATIENT INSTRUCTIONS
-Keep dressings clean and dry. Change dressings every 3-4 days, and if the dressings become saturated, soiled, or fall off.    -If you need to change your dressings at home: Wash your wound with normal saline, wound cleanser, or unscented soap and water prior to applying your new dressings. Please avoid cleansing with hydrogen peroxide or rubbing alcohol. Hydrogen peroxide and rubbing alcohol are toxic to new tissue and skin cells.    -Avoid prolonged standing or sitting without elevating your legs.    -Remove your compression garments if you have severe pain, severe swelling, numbness, color change, or temperature change in your toes. If you need to remove your compression garments, do so by unrolling them. Do not cut the compression garments off, this is to prevent cutting yourself on accident.    -Never walk around the house barefoot. Always wear a rubber soled slipper when walking around the house.    -Wear your offloading boot any time you are up walking.    -Should you experience any significant changes in your wound(s), such as signs of infection (increasing redness, swelling, localized heat, increased pain, fever > 101 F, chills) or have any questions regarding your home care instructions, please contact the wound center at [phone]. If after hours, contact your primary care physician or go to the hospital emergency room.     -If you are admitted to any hospital, you will need a new referral to come back to the wound clinic and any scheduled appointments that you already have, may be cancelled.     -If you are 5 or more minutes late for an appointment, we reserve the right to cancel and reschedule that appointment. Additionally, if you are habitually late or not showing (3 late cancellations and/or no shows), we reserve the right to cancel your remaining appointments and it will be your responsibility to obtain a new referral if services are still needed.

## 2024-11-01 ENCOUNTER — HOSPITAL ENCOUNTER (EMERGENCY)
Facility: MEDICAL CENTER | Age: 74
End: 2024-11-01
Attending: EMERGENCY MEDICINE
Payer: MEDICARE

## 2024-11-01 ENCOUNTER — APPOINTMENT (OUTPATIENT)
Dept: RADIOLOGY | Facility: MEDICAL CENTER | Age: 74
End: 2024-11-01
Attending: EMERGENCY MEDICINE
Payer: MEDICARE

## 2024-11-01 VITALS
SYSTOLIC BLOOD PRESSURE: 147 MMHG | DIASTOLIC BLOOD PRESSURE: 74 MMHG | TEMPERATURE: 96.8 F | WEIGHT: 140.8 LBS | RESPIRATION RATE: 30 BRPM | OXYGEN SATURATION: 96 % | HEART RATE: 57 BPM | BODY MASS INDEX: 20.16 KG/M2 | HEIGHT: 70 IN

## 2024-11-01 DIAGNOSIS — R53.1 GENERALIZED WEAKNESS: ICD-10-CM

## 2024-11-01 DIAGNOSIS — I95.9 HYPOTENSION, UNSPECIFIED HYPOTENSION TYPE: ICD-10-CM

## 2024-11-01 LAB
ALBUMIN SERPL BCP-MCNC: 4 G/DL (ref 3.2–4.9)
ALBUMIN/GLOB SERPL: 1 G/DL
ALP SERPL-CCNC: 121 U/L (ref 30–99)
ALT SERPL-CCNC: 26 U/L (ref 2–50)
ANION GAP SERPL CALC-SCNC: 8 MMOL/L (ref 7–16)
APPEARANCE UR: CLEAR
AST SERPL-CCNC: 27 U/L (ref 12–45)
BASOPHILS # BLD AUTO: 0.8 % (ref 0–1.8)
BASOPHILS # BLD: 0.06 K/UL (ref 0–0.12)
BILIRUB SERPL-MCNC: 0.3 MG/DL (ref 0.1–1.5)
BILIRUB UR QL STRIP.AUTO: NEGATIVE
BUN SERPL-MCNC: 34 MG/DL (ref 8–22)
CALCIUM ALBUM COR SERPL-MCNC: 10.3 MG/DL (ref 8.5–10.5)
CALCIUM SERPL-MCNC: 10.3 MG/DL (ref 8.5–10.5)
CHLORIDE SERPL-SCNC: 101 MMOL/L (ref 96–112)
CO2 SERPL-SCNC: 25 MMOL/L (ref 20–33)
COLOR UR: YELLOW
CREAT SERPL-MCNC: 0.86 MG/DL (ref 0.5–1.4)
EKG IMPRESSION: NORMAL
EOSINOPHIL # BLD AUTO: 0.21 K/UL (ref 0–0.51)
EOSINOPHIL NFR BLD: 3 % (ref 0–6.9)
ERYTHROCYTE [DISTWIDTH] IN BLOOD BY AUTOMATED COUNT: 47.2 FL (ref 35.9–50)
GFR SERPLBLD CREATININE-BSD FMLA CKD-EPI: 92 ML/MIN/1.73 M 2
GLOBULIN SER CALC-MCNC: 4.2 G/DL (ref 1.9–3.5)
GLUCOSE SERPL-MCNC: 186 MG/DL (ref 65–99)
GLUCOSE UR STRIP.AUTO-MCNC: >=1000 MG/DL
HCT VFR BLD AUTO: 38.5 % (ref 42–52)
HGB BLD-MCNC: 12.1 G/DL (ref 14–18)
IMM GRANULOCYTES # BLD AUTO: 0.01 K/UL (ref 0–0.11)
IMM GRANULOCYTES NFR BLD AUTO: 0.1 % (ref 0–0.9)
KETONES UR STRIP.AUTO-MCNC: NEGATIVE MG/DL
LACTATE SERPL-SCNC: 1.1 MMOL/L (ref 0.5–2)
LEUKOCYTE ESTERASE UR QL STRIP.AUTO: NEGATIVE
LYMPHOCYTES # BLD AUTO: 1.39 K/UL (ref 1–4.8)
LYMPHOCYTES NFR BLD: 19.7 % (ref 22–41)
MCH RBC QN AUTO: 28.7 PG (ref 27–33)
MCHC RBC AUTO-ENTMCNC: 31.4 G/DL (ref 32.3–36.5)
MCV RBC AUTO: 91.4 FL (ref 81.4–97.8)
MICRO URNS: ABNORMAL
MONOCYTES # BLD AUTO: 0.48 K/UL (ref 0–0.85)
MONOCYTES NFR BLD AUTO: 6.8 % (ref 0–13.4)
NEUTROPHILS # BLD AUTO: 4.92 K/UL (ref 1.82–7.42)
NEUTROPHILS NFR BLD: 69.6 % (ref 44–72)
NITRITE UR QL STRIP.AUTO: NEGATIVE
NRBC # BLD AUTO: 0 K/UL
NRBC BLD-RTO: 0 /100 WBC (ref 0–0.2)
PH UR STRIP.AUTO: 5.5 [PH] (ref 5–8)
PLATELET # BLD AUTO: 328 K/UL (ref 164–446)
PMV BLD AUTO: 10.8 FL (ref 9–12.9)
POTASSIUM SERPL-SCNC: 5.2 MMOL/L (ref 3.6–5.5)
PROT SERPL-MCNC: 8.2 G/DL (ref 6–8.2)
PROT UR QL STRIP: NEGATIVE MG/DL
RBC # BLD AUTO: 4.21 M/UL (ref 4.7–6.1)
RBC UR QL AUTO: NEGATIVE
SODIUM SERPL-SCNC: 134 MMOL/L (ref 135–145)
SP GR UR STRIP.AUTO: 1.02
TROPONIN T SERPL-MCNC: 26 NG/L (ref 6–19)
UROBILINOGEN UR STRIP.AUTO-MCNC: 0.2 EU/DL
WBC # BLD AUTO: 7.1 K/UL (ref 4.8–10.8)

## 2024-11-01 PROCEDURE — 93005 ELECTROCARDIOGRAM TRACING: CPT | Performed by: EMERGENCY MEDICINE

## 2024-11-01 PROCEDURE — 71045 X-RAY EXAM CHEST 1 VIEW: CPT

## 2024-11-01 PROCEDURE — 99285 EMERGENCY DEPT VISIT HI MDM: CPT

## 2024-11-01 PROCEDURE — 700105 HCHG RX REV CODE 258: Performed by: EMERGENCY MEDICINE

## 2024-11-01 PROCEDURE — 83605 ASSAY OF LACTIC ACID: CPT

## 2024-11-01 PROCEDURE — 84484 ASSAY OF TROPONIN QUANT: CPT

## 2024-11-01 PROCEDURE — 80053 COMPREHEN METABOLIC PANEL: CPT

## 2024-11-01 PROCEDURE — 93005 ELECTROCARDIOGRAM TRACING: CPT

## 2024-11-01 PROCEDURE — 81003 URINALYSIS AUTO W/O SCOPE: CPT

## 2024-11-01 PROCEDURE — 85025 COMPLETE CBC W/AUTO DIFF WBC: CPT

## 2024-11-01 PROCEDURE — 36415 COLL VENOUS BLD VENIPUNCTURE: CPT

## 2024-11-01 RX ORDER — SODIUM CHLORIDE 9 MG/ML
500 INJECTION, SOLUTION INTRAVENOUS ONCE
Status: COMPLETED | OUTPATIENT
Start: 2024-11-01 | End: 2024-11-01

## 2024-11-01 RX ADMIN — SODIUM CHLORIDE 500 ML: 9 INJECTION, SOLUTION INTRAVENOUS at 13:58

## 2024-11-01 NOTE — ED NOTES
Discharge instructions reviewed with patient and signed. IV was removed from arm. They verbalized understanding of follow up instructions. All belongings with patient. They ambulate with a steady gait using cane. He was wheeled out of ED via wheelchair and his daughters are driving him home

## 2024-11-01 NOTE — ED PROVIDER NOTES
ED Provider Note    CHIEF COMPLAINT  Chief Complaint   Patient presents with    Weakness     Pt woke up this morning feeling weak. When he was at the breakfast table, pt started feeling more weak, pts daughter took his BP and it was 54/39     EXTERNAL RECORDS REVIEWED  Patient was seen by PCP on 10/22/24 for diabetic ulcer and was referred to wound care. He has a history of CHF and is followed by cardiology. Last saw cardiology 10/18/24 and was started on anticoagulation medication at that time for atrial fibrillation. The patient had a stress test which was negative and an echocardiogram showing EF of 55-60 % on 10/24/24.    HPI/ROS  LIMITATION TO HISTORY   Select: : None  OUTSIDE HISTORIAN(S):  Family Daughter confirms history    Rogelio Padilla is a 74 y.o. male who presents to the Emergency Department with weakness onset earlier today. The patient reports that he was standing up from a chair this morning when he noticed generalized weakness. After one hour his symptoms resolved. His daughter notes that he looked paler than usual. Patient's blood pressure was 84/39 at home. Denies acute light headedness or dizziness. Denies chest pain, lower extremity swelling.  No recent fevers or infectious symptoms. Patient needs assistance with daily activities and has memory issues at baseline. Medications are managed by family members.     PAST MEDICAL HISTORY  Past Medical History:   Diagnosis Date    Atrial fibrillation (HCC)     CHF (congestive heart failure) (HCC)     Diabetes mellitus type 2, noninsulin dependent (HCC)     Hyperlipidemia     Hypertension         SURGICAL HISTORY  Past Surgical History:   Procedure Laterality Date    HERNIA REPAIR          FAMILY HISTORY  Family History   Problem Relation Age of Onset    Heart Attack Mother        SOCIAL HISTORY   reports that he has never smoked. He has been exposed to tobacco smoke. He has never used smokeless tobacco. He reports current alcohol use of about 3.6  "oz of alcohol per week. He reports that he does not use drugs.    CURRENT MEDICATIONS  Current Outpatient Medications   Medication Instructions    Alcohol Swabs Wipe site with prep pad prior to injection.    apixaban (ELIQUIS) 5 mg, Oral, 2 TIMES DAILY    atorvastatin (LIPITOR) 40 mg, Oral, DAILY    Blood Glucose Meter Kit Test blood sugar as recommended by provider. Embrace pro blood glucose monitoring kit.    Blood Glucose Test Strips Use one Embrace pro strip to test blood sugar three times daily.    Canagliflozin 100 mg, Oral, DAILY    carvedilol (COREG) 3.125 mg, Oral, 2 TIMES DAILY    clindamycin (CLEOCIN) 150 mg, Oral, 3 TIMES DAILY, Takes it every 6 hours    Continuous Glucose  (FREESTYLE HALEY 3 READER) Device 1 Units, Does not apply, DAILY, Use daily for continuous blood glucose monitoring    Continuous Glucose Sensor (FREESTYLE HALEY 3 SENSOR) Misc 1 Application, Does not apply, EVERY 14 DAYS, Apply 1 sensor every 14 days for continuous blood glucose monitoring    diclofenac sodium (VOLTAREN) 2 g, Topical, 4 TIMES DAILY PRN    glipiZIDE ER (GLUCOTROL XL) 2.5 mg, Oral, DAILY    HYDROcodone-acetaminophen (NORCO) 5-325 MG Tab per tablet 1 Tablet, Oral, EVERY 4 HOURS PRN    insulin glargine 10 Units, Subcutaneous, EVERY EVENING    Insulin Pen Needle 32 G x 4 mm Use one pen needle in pen device to inject insulin once daily.    Lancets Use one Embrace pro lancet to test blood sugar three times daily.    losartan (COZAAR) 25 mg, Oral, DAILY    sennosides (SENOKOT) 17.2 mg, Oral, DAILY    sertraline (ZOLOFT) 25 mg, Oral, DAILY    spironolactone (ALDACTONE) 25 mg, Oral, DAILY      ALLERGIES  Metformin, Neosporin [bacitracin-polymyxin b], and Penicillins    PHYSICAL EXAM  /61   Pulse 60   Temp 36 °C (96.8 °F) (Temporal)   Resp 18   Ht 1.778 m (5' 10\")   Wt 63.9 kg (140 lb 12.8 oz)   SpO2 97%        Constitutional: Nontoxic appearing. Alert in no apparent distress.  HENT: Normocephalic, " Atraumatic. Bilateral external ears normal. Nose normal.  Moist mucous membranes.  Oropharynx clear.  Eyes: Pupils are equal and reactive. Conjunctiva normal.   Neck: Supple, full range of motion  Heart: Regular rate and rhythm.  No murmurs.    Lungs: No respiratory distress, normal work of breathing. Lungs clear to auscultation bilaterally.  Abdomen Soft, no distention.  No tenderness to palpation.  Musculoskeletal: Atraumatic. No obvious deformities noted.  No lower extremity edema.  Skin: Warm, Dry.  No erythema, No rash.   Neurologic: Alert and oriented x3.  Cranial nerves II-XII intact.  Strength 5/5 and sensation intact throughout all 4 extremities.  No pronator drift.  No dysmetria.  Normal speech. Normal gait.   Psychiatric: Affect normal, Mood normal, Appears appropriate and not intoxicated.     DIAGNOSTIC STUDIES / PROCEDURES    EKG  I have independently interpreted this EKG  Results for orders placed or performed during the hospital encounter of 24   EKG   Result Value Ref Range    Report       [facility] Emergency Dept.    Test Date:  2024  Pt Name:    DIVYA PRINCE                Department: ER  MRN:        [id number]                      Room:  Gender:     Male                         Technician: [id number]  :        1950                   Requested By:ER TRIAGE PROTOCOL  Order #:    [id number]                    Reading MD: Zenia Collins MD    Measurements  Intervals                                Axis  Rate:       61                           P:          75  AR:         180                          QRS:        69  QRSD:       94                           T:          67  QT:         411  QTc:        414    Interpretive Statements  Sinus rhythm  Normal axis and intervals  Mild diffuse ST elevation no reciprocal change  Compared to ECG 10/18/2024 13:03:05  No significant changes  Electronically Signed On 2024 13:31:28 PDT by Zenia Collins MD         LABS  Labs  Reviewed   CBC WITH DIFFERENTIAL - Abnormal; Notable for the following components:       Result Value    RBC 4.21 (*)     Hemoglobin 12.1 (*)     Hematocrit 38.5 (*)     MCHC 31.4 (*)     Lymphocytes 19.70 (*)     All other components within normal limits   COMP METABOLIC PANEL - Abnormal; Notable for the following components:    Sodium 134 (*)     Glucose 186 (*)     Bun 34 (*)     Alkaline Phosphatase 121 (*)     Globulin 4.2 (*)     All other components within normal limits   TROPONIN - Abnormal; Notable for the following components:    Troponin T 26 (*)     All other components within normal limits   URINALYSIS - Abnormal; Notable for the following components:    Glucose >=1000 (*)     All other components within normal limits   LACTIC ACID   ESTIMATED GFR         RADIOLOGY  I have independently interpreted the diagnostic imaging associated with this visit and am waiting the final reading from the radiologist.   My preliminary interpretation is as follows: no infiltrate    Radiologist interpretation:  DX-CHEST-PORTABLE (1 VIEW)   Final Result      No acute cardiac or pulmonary abnormalities are identified.            COURSE & MEDICAL DECISION MAKING    1:21 PM - Patient seen and examined at bedside. Discussed plan of care, including labs and imaging. Patient agrees to the plan of care. The patient will be resuscitated with 500 mL NS IV. Ordered for DX-Chest, CBC with diff, CMP, Troponin, US, Lactic acid, and EKG to evaluate his symptoms.     Hydration: Based on the patient's presentation of Dehydration the patient was given IV fluids. IV Hydration was used because oral hydration was not adequate alone. Upon recheck following hydration, the patient was feeling improved.    ASSESSMENT, COURSE AND PLAN  Care Narrative: Patient with history of CHF, atrial fibrillation on anticoagulation, DM who presents with an episode of weakness upon standing this morning with associated low blood pressure.  Blood pressure is  improved on arrival however still borderline low. Other vital signs normal. Considered neuroimaging however no history of trauma and no focal neurologic deficits on exam.  EKG does not show ischemia or arrhythmia.  Troponin is only borderline elevated.  No symptoms of ACS.  Lactate and white blood cell count are normal without concerns for sepsis or infectious process.  Chest xray without  pneumonia or pulmonary edema.  No signs of decompensated heart failure.  UA negative for infection.  Patient with positive orthostatics therefore given small fluid bolus with improvement of symptoms.    4:17 PM - Upon reassessment, patient is resting comfortably with normal vital signs.  No new complaints at this time.  Discussed results with patient and/or family as well as importance of primary care follow up.  Patient understands plan of care and strict return precautions for new or changing symptoms.    ADDITIONAL PROBLEM LIST  Problem #1: Acute generalized weakness - likely related to low blood pressure, symptoms resolved following IV fluids, workup otherwise reassuring    Problem #2: Hypotension - resolved with fluids, discussed close monitoring of blood pressure at home and follow up with PCP, discussed with patient and family could consider holding losartan (25mg) if blood pressures are consistently low      DISPOSITION AND DISCUSSIONS  Escalation of care considered, and ultimately not performed:diagnostic imaging and acute inpatient care management, however at this time, the patient is most appropriate for outpatient management      The patient will return for new or worsening symptoms and is stable at the time of discharge.    The patient is referred to a primary physician for blood pressure management, diabetic screening, and for all other preventative health concerns.    DISPOSITION:  Patient will be discharged home in stable condition.    FOLLOW UP:  Narciso Rose M.D.  [address]  [address]  [phone]    Schedule an appointment as soon as possible for a visit       [facility], Emergency Dept  [address] [facility] [id number] [phone]    If symptoms worsen      FINAL DIAGNOSIS  1. Generalized weakness    2. Hypotension, unspecified hypotension type        The note accurately reflects work and decisions made by me.  Zenia Collins M.D.  11/2/2024  8:38 AM     IKarlene (Ferchoibisamar), am scribing for, and in the presence of, Zenia Collins M.D..    Electronically signed by: Karlene Esqueda (Shannan), 11/1/2024    Zenia ALLRED M.D. personally performed the services described in this documentation, as scribed by Karlene Esqueda in my presence, and it is both accurate and complete.       Not indicated for this patient

## 2024-11-01 NOTE — DISCHARGE INSTRUCTIONS
You were seen in the Emergency Department for weakness and episode of low blood pressure.    EKG, labs, chest x-ray, urinalysis were completed without significant acute abnormalities.    Please continue your home medication and monitor your blood pressure at home.  If your blood pressure is consistently with systolic blood pressures over 100, you may consider holding off on the losartan until you are able to follow-up with your primary doctor.    Please follow up with your primary care physician.    Return to the Emergency Department with chest pain, trouble breathing, severe lightheadedness or fainting, or other concern.

## 2024-11-01 NOTE — ED NOTES
"Patient ambulatory to restroom and back with a steady gait. States \"I feel 200% better than when I got here.\" He was provided with cup of water. Family at bedside, awaiting ERP re-eval  "

## 2024-11-01 NOTE — ED TRIAGE NOTES
"Chief Complaint   Patient presents with    Weakness     Pt woke up this morning feeling weak. When he was at the breakfast table, pt started feeling more weak, pts daughter took his BP and it was 54/39       Pt BIB wheelchair with two daughters. Pt had a slight headache as well this morning. No unilateral weakness, pts BG was 102 this morning per daughters. Pt aox4 gcs 15              /55   Pulse 63   Temp 36 °C (96.8 °F) (Temporal)   Resp 14   Ht 1.778 m (5' 10\")   Wt 63.9 kg (140 lb 12.8 oz)   SpO2 98%   BMI 20.20 kg/m²     "

## 2024-11-05 ENCOUNTER — OFFICE VISIT (OUTPATIENT)
Dept: MEDICAL GROUP | Facility: CLINIC | Age: 74
End: 2024-11-05
Payer: MEDICARE

## 2024-11-05 VITALS
SYSTOLIC BLOOD PRESSURE: 98 MMHG | WEIGHT: 144 LBS | HEART RATE: 72 BPM | BODY MASS INDEX: 20.62 KG/M2 | TEMPERATURE: 97 F | OXYGEN SATURATION: 94 % | DIASTOLIC BLOOD PRESSURE: 41 MMHG | HEIGHT: 70 IN | RESPIRATION RATE: 18 BRPM

## 2024-11-05 DIAGNOSIS — E11.69 TYPE 2 DIABETES MELLITUS WITH OTHER SPECIFIED COMPLICATION, WITH LONG-TERM CURRENT USE OF INSULIN (HCC): ICD-10-CM

## 2024-11-05 DIAGNOSIS — L97.529 DIABETIC ULCER OF TOE OF LEFT FOOT ASSOCIATED WITH TYPE 2 DIABETES MELLITUS, UNSPECIFIED ULCER STAGE (HCC): ICD-10-CM

## 2024-11-05 DIAGNOSIS — E11.621 DIABETIC ULCER OF TOE OF LEFT FOOT ASSOCIATED WITH TYPE 2 DIABETES MELLITUS, UNSPECIFIED ULCER STAGE (HCC): ICD-10-CM

## 2024-11-05 DIAGNOSIS — Z79.4 TYPE 2 DIABETES MELLITUS WITH OTHER SPECIFIED COMPLICATION, WITH LONG-TERM CURRENT USE OF INSULIN (HCC): ICD-10-CM

## 2024-11-05 DIAGNOSIS — I50.9 HEART FAILURE, UNSPECIFIED HF CHRONICITY, UNSPECIFIED HEART FAILURE TYPE (HCC): ICD-10-CM

## 2024-11-05 PROCEDURE — 3074F SYST BP LT 130 MM HG: CPT

## 2024-11-05 PROCEDURE — 99213 OFFICE O/P EST LOW 20 MIN: CPT

## 2024-11-05 PROCEDURE — 3078F DIAST BP <80 MM HG: CPT

## 2024-11-05 RX ORDER — FUROSEMIDE 40 MG/1
1 TABLET ORAL
COMMUNITY

## 2024-11-05 ASSESSMENT — FIBROSIS 4 INDEX: FIB4 SCORE: 1.16

## 2024-11-05 NOTE — PROGRESS NOTES
[facility]     PATIENT ID:  NAME:  Rogelio Padilla  MRN:               [id number]  YOB: 1950    Date: 9:42 AM      Fellow: Narciso Rose M.D.    CC:  Follow up from ED       HPI: Rogelio Padilla is a 74 y.o. male who presented with after recently being seen in the emergency department.  The patient and his daughter state that over the past week he has had low blood pressures in the mornings ranging from 50-70/40-60 and has had some mild associated lightheadedness, especially when standing up from a seated position.  Patient denies any recent falls, head trauma, chest pain, shortness of breath, fever, chills, headache, trauma, injury, increased urination or decreased urination, changes to his stools or any other associated concerns.  The patient states that they were seen in the emergency department and advised to increased fluid intake.  Otherwise the patient states in the past 24 hours he has been doing better.  Of note the patient states that he had a colonoscopy yesterday and and symptoms seemed worse while doing the colonoscopy prep and have improved afterwards.    No problems updated.    REVIEW OF SYSTEMS:   Ten systems reviewed and were negative except as noted in the HPI.                PROBLEM LIST  Patient Active Problem List   Diagnosis    Paroxysmal atrial fibrillation (HCC)    Primary hypertension    Dyslipidemia    Chronic systolic congestive heart failure (HCC)        PAST SURGICAL HISTORY:  Past Surgical History:   Procedure Laterality Date    HERNIA REPAIR         FAMILY HISTORY:  Family History   Problem Relation Age of Onset    Heart Attack Mother        SOCIAL HISTORY:   Social History     Tobacco Use    Smoking status: Never     Passive exposure: Current    Smokeless tobacco: Never   Substance Use Topics    Alcohol use: Yes     Alcohol/week: 3.6 oz     Types: 6 Cans of beer per week       ALLERGIES:  Allergies   Allergen Reactions    Metformin Diarrhea           Neosporin [Bacitracin-Polymyxin B] Rash          Penicillins Rash             OUTPATIENT MEDICATIONS:    Current Outpatient Medications:     furosemide (LASIX) 40 MG Tab, Take 1 Tablet by mouth every day., Disp: , Rfl:     atorvastatin (LIPITOR) 40 MG Tab, Take 1 Tablet by mouth every day., Disp: 90 Tablet, Rfl: 3    apixaban (ELIQUIS) 5mg Tab, Take 1 Tablet by mouth 2 times a day., Disp: 180 Tablet, Rfl: 3    spironolactone (ALDACTONE) 25 MG Tab, Take 1 Tablet by mouth every day., Disp: 90 Tablet, Rfl: 3    losartan (COZAAR) 25 MG Tab, Take 1 Tablet by mouth every day., Disp: 90 Tablet, Rfl: 3    insulin glargine 100 UNIT/ML Solution Pen-injector injection, Inject 10 Units under the skin every evening for 180 days., Disp: 9 mL, Rfl: 1    Lancets, Use one Unipower Batteryace pro lancet to test blood sugar three times daily., Disp: 100 Each, Rfl: 0    Alcohol Swabs, Wipe site with prep pad prior to injection., Disp: 100 Each, Rfl: 2    Insulin Pen Needle 32 G x 4 mm, Use one pen needle in pen device to inject insulin once daily., Disp: 100 Each, Rfl: 3    carvedilol (COREG) 3.125 MG Tab, Take 3.125 mg by mouth 2 times a day., Disp: , Rfl:     sennosides (SENOKOT) 8.6 MG Tab, Take 17.2 mg by mouth every day., Disp: , Rfl:     diclofenac sodium (VOLTAREN) 1 % Gel, Apply 2 g topically 4 times a day as needed., Disp: , Rfl:     sertraline (ZOLOFT) 25 MG tablet, Take 1 Tablet by mouth every day., Disp: 30 Tablet, Rfl: 11    Continuous Glucose  (FREESTYLE HALEY 3 READER) Device, 1 Units every day. Use daily for continuous blood glucose monitoring, Disp: 1 Each, Rfl: 0    Continuous Glucose Sensor (FREESTYLE HALEY 3 SENSOR) Misc, 1 Application every 14 days for 28 days. Apply 1 sensor every 14 days for continuous blood glucose monitoring, Disp: 2 Each, Rfl: 5    Blood Glucose Meter Kit, Test blood sugar as recommended by provider. Embrace pro blood glucose monitoring kit., Disp: 1 Kit, Rfl: 0    Blood Glucose Test Strips, Use  "one Embrace pro strip to test blood sugar three times daily., Disp: 100 Strip, Rfl: 0    Canagliflozin 100 MG Tab, Take 100 mg by mouth every day., Disp: 30 Tablet, Rfl: 1    PHYSICAL EXAM:  Vitals:    11/05/24 0837   BP: 98/41   Pulse: 72   Resp: 18   Temp: 36.1 °C (97 °F)   TempSrc: Temporal   SpO2: 94%   Weight: 65.3 kg (144 lb)   Height: 1.778 m (5' 10\")       General: Pt resting in NAD, pleasant and cooperative   Skin:  Pink, warm and dry.  HEENT: NC/AT. EOMI. PERRLA, MMM  Lungs:  Symmetrical.  CTAB, good air movement, no adventitious sounds, no rales  Cardiovascular:  S1/S2 RRR, no murmurs rubs or gallops, no lower extremity edema  Abdomen:  Abdomen is soft, nontender, no distention  Extremities:  Full range of motion.  CNS:  Muscle tone is normal. No gross focal neurologic deficits      ASSESSMENT/PLAN:   74 y.o. male who presents to clinic for follow-up after visit to the emergency department.  The patient states that over the past week he has noticed lower blood pressures when he does check it in the morning and some associated lightheadedness.  He denies any other associated symptoms including chest pain, fever, chills, nausea, vomiting, headache, falls, injury, trauma, changes to his bowel habits, increased or decreased urination or any other concerns.  He states that he has been taking all of his medications as prescribed but does seem slightly confused about his current regimen and him and his daughter asked that I write it out for them to ensure they are taking medicines correctly.  Will provide them with a medication sheet for this.  Counseled patient and daughter on the patient's medications that will affect his blood pressure, currently we will discontinue losartan until patient is able to follow-up in cardiology office for continued management. Also advised to continue to hold Lasix as patient has no edema on examination and recent weight has been stable.  Advised patient and daughter if blood " pressures improve to greater than 120/80 with discontinuation of losartan they can try half a tab until following up with cardiology but our main goal will be to prevent repeat ED visit.  Also counseled patient and daughter on patient's fluid status as they are confused about how much fluid the patient should be drinking.  Counseled on daily weights and importance of consistent fluid intake without changes and daily weight to ensure patient is getting enough fluid but is also not holding extra fluid.  Counseled patient on beta-blocker as well to skip doses if heart rate is less than 60 as it was noted that his heart rate was below 60 in the emergency department.  Advised patient and family if any further concerns or questions to reach out to me on MyChart or to return to clinic for further visits.  Will plan to follow-up after patient is seen at cardiology office if any further concerns or at the longest in approximately 3 months.    Patient's daughter also notes that she will be returning back home soon and they are hoping for more resources for home health.  At this time we will provide them with a referral to social work to discuss in depth so we can make a plan as to most effective home health set up.  Patient does currently have home health for wound care and physical therapy but family notes that they do need more help.    Problem List Items Addressed This Visit    None  Visit Diagnoses       Type 2 diabetes mellitus with other specified complication, with long-term current use of insulin (HCC)        Relevant Orders    REFERRAL TO CARE MANAGEMENT    Diabetic ulcer of toe of left foot associated with type 2 diabetes mellitus, unspecified ulcer stage (HCC)        Relevant Orders    REFERRAL TO CARE MANAGEMENT    Heart failure, unspecified HF chronicity, unspecified heart failure type (HCC)        Relevant Medications    furosemide (LASIX) 40 MG Tab    Other Relevant Orders    REFERRAL TO CARE MANAGEMENT             Narciso Rose M.D.  PGY-4, Wilderness Fellow  [facility]

## 2024-11-06 ENCOUNTER — NON-PROVIDER VISIT (OUTPATIENT)
Dept: VASCULAR LAB | Facility: MEDICAL CENTER | Age: 74
End: 2024-11-06
Attending: INTERNAL MEDICINE
Payer: MEDICARE

## 2024-11-06 DIAGNOSIS — Z79.4 TYPE 2 DIABETES MELLITUS WITH HYPERGLYCEMIA, WITH LONG-TERM CURRENT USE OF INSULIN (HCC): ICD-10-CM

## 2024-11-06 DIAGNOSIS — E11.65 TYPE 2 DIABETES MELLITUS WITH HYPERGLYCEMIA, WITH LONG-TERM CURRENT USE OF INSULIN (HCC): ICD-10-CM

## 2024-11-06 PROCEDURE — 99213 OFFICE O/P EST LOW 20 MIN: CPT

## 2024-11-06 NOTE — PROGRESS NOTES
Patient Consult Note    Primary care physician: Narciso Rose M.D.    Reason for consult: Management of Uncontrolled Type 2 Diabetes    HPI:  Rogelio Padilla is a 74 y.o. old patient who comes in today for evaluation of above stated problem.    Diagnosed in 2012  Allergies  Metformin, Neosporin [bacitracin-polymyxin b], and Penicillins    Current Diabetes Medication Regimen  SGLT-2 Inhibitor: canagliflozin (Invokana) 100 mg daily    Basal Insulin: Lantus 10 units daily      Previous Diabetes Medications and Reason for Discontinuation  Metformin: diarrhea  Januvia: unsure why it was stopped  Farxiag: unable to afford    Potential Barriers to Care:  Adherence: denies missed doses  Side effects: none  Affordability: none    SMBG  Pt has home glucometer and proper testing technique - Yes, uses glucometer  Discussed BG Goals: FBG 80 - 130, 2hPP < 180, A1c < 7.0%    Pt reports blood sugars:   Before Breakfast:     Hypoglycemia  Hypoglycemia awareness: Yes  Nocturnal hypoglycemia: None  Hypoglycemia:  Symptomatic Hypoglycemia,   Pt's treatment of Hypoglycemia  Discussed 15:15 Rule    Lifestyle  Current Exercise - PT and sit to stands    Dietary - Common Adult  Breakfast - banana and ceral, oatmeal with raisins, scrambled eggs, PB and berries  Lunch - leftovers, mac and cheese, tuna sandwich  Dinner -  steak, chicken, mac and cheese, stuffing, salad  Snacks - occasionally, pb filled pretzels, potato chips, mandarin orange, cheese  Drinks - protein shakes, bais, 1-2 cups of coffee     Labs  Lab Results   Component Value Date/Time    HBA1C 9.3 (A) 09/30/2024 12:00 AM      Lab Results   Component Value Date/Time    SODIUM 134 (L) 11/01/2024 12:02 PM    POTASSIUM 5.2 11/01/2024 12:02 PM    CHLORIDE 101 11/01/2024 12:02 PM    CO2 25 11/01/2024 12:02 PM    GLUCOSE 186 (H) 11/01/2024 12:02 PM    BUN 34 (H) 11/01/2024 12:02 PM    CREATININE 0.86 11/01/2024 12:02 PM     Lab Results   Component Value Date/Time     "ALKPHOSPHAT 121 (H) 11/01/2024 12:02 PM    ASTSGOT 27 11/01/2024 12:02 PM    ALTSGPT 26 11/01/2024 12:02 PM    TBILIRUBIN 0.3 11/01/2024 12:02 PM    ALBUMIN 4.0 11/01/2024 12:02 PM      No results found for: \"CHOLSTRLTOT\", \"LDL\", \"HDL\", \"TRIGLYCERIDE\"    No results found for: \"MALBCRT\", \"MICROALBUR\"    Physical Examination:  Vital signs: There were no vitals taken for this visit. There is no height or weight on file to calculate BMI.    Assessment and Plan:    1. DM2  Basic physiology of DMII was explained to patient as well as microvascular/macrovascular complications. The importance of increasing physical activity to improve diabetes control was discussed with the patient. Patient was also educated on changing diet and making better choices to help control blood sugar.   Discussed Goals: FBG 80 - 130, 2hPP < 180, a1c < 7.0%  Last a1c drawn on 9/30/24 was 9.3%, which is not at goal and has not changed (unable to see previous A1c) since the previous reading  Lowest BG were from his colonoscopy prep, when he was not eating.  Patient has been dealing with diabetic foot ulcer, and is trying to get his BG under control to help improve wound healing. Will hold off on adjusting Lantus today and initiate Trulicity to help improve PP BG. While mealtime insulin would accomplish this faster, his medical care is managed by his daughters for whom thrice daily shots would prove a burden.   Patient is on a fixed income, so cost of medications plays a large role in his ability to get them. Checked price on Trulicity, and the test claim came back for $11.20.     - Medication changes:  Initiate Trulicity 0.75 mg  - Continue:  Lantus 10 units  Invokana 100 mg daily     - Lifestyle changes:  Exercise Goal - Increase as tolerated  Dietary Goal - Decrease simple sugars    - Preventative management:  REC DM Score: 4  Care gaps addressed:   A1c above 8: see above  Lipid Levels: ordered, will remind pt to get done  Urine Protein screening: " Will order at next visit  Eye Exam: let patient know that he was due  Care gaps updated in Health Maintenance    Follow Up:  5 weeks    Vivian Mas, PharmD    CC:   Narciso Rose M.D.

## 2024-11-07 ENCOUNTER — OFFICE VISIT (OUTPATIENT)
Dept: WOUND CARE | Facility: MEDICAL CENTER | Age: 74
End: 2024-11-07
Payer: MEDICARE

## 2024-11-07 VITALS
TEMPERATURE: 96.8 F | OXYGEN SATURATION: 95 % | DIASTOLIC BLOOD PRESSURE: 50 MMHG | SYSTOLIC BLOOD PRESSURE: 108 MMHG | RESPIRATION RATE: 17 BRPM | HEART RATE: 58 BPM

## 2024-11-07 DIAGNOSIS — E11.42 POORLY CONTROLLED TYPE 2 DIABETES MELLITUS WITH PERIPHERAL NEUROPATHY (HCC): ICD-10-CM

## 2024-11-07 DIAGNOSIS — E11.621 DIABETIC ULCER OF TOE OF LEFT FOOT ASSOCIATED WITH TYPE 2 DIABETES MELLITUS, WITH FAT LAYER EXPOSED (HCC): ICD-10-CM

## 2024-11-07 DIAGNOSIS — T14.8XXA WOUND INFECTION: ICD-10-CM

## 2024-11-07 DIAGNOSIS — L97.522 DIABETIC ULCER OF TOE OF LEFT FOOT ASSOCIATED WITH TYPE 2 DIABETES MELLITUS, WITH FAT LAYER EXPOSED (HCC): ICD-10-CM

## 2024-11-07 DIAGNOSIS — E11.65 POORLY CONTROLLED TYPE 2 DIABETES MELLITUS WITH PERIPHERAL NEUROPATHY (HCC): ICD-10-CM

## 2024-11-07 DIAGNOSIS — L08.9 WOUND INFECTION: ICD-10-CM

## 2024-11-07 PROCEDURE — 99213 OFFICE O/P EST LOW 20 MIN: CPT | Mod: 25 | Performed by: STUDENT IN AN ORGANIZED HEALTH CARE EDUCATION/TRAINING PROGRAM

## 2024-11-07 PROCEDURE — 11042 DBRDMT SUBQ TIS 1ST 20SQCM/<: CPT | Performed by: STUDENT IN AN ORGANIZED HEALTH CARE EDUCATION/TRAINING PROGRAM

## 2024-11-07 PROCEDURE — 3078F DIAST BP <80 MM HG: CPT | Performed by: STUDENT IN AN ORGANIZED HEALTH CARE EDUCATION/TRAINING PROGRAM

## 2024-11-07 PROCEDURE — 3074F SYST BP LT 130 MM HG: CPT | Performed by: STUDENT IN AN ORGANIZED HEALTH CARE EDUCATION/TRAINING PROGRAM

## 2024-11-07 PROCEDURE — 11042 DBRDMT SUBQ TIS 1ST 20SQCM/<: CPT

## 2024-11-08 ENCOUNTER — TELEPHONE (OUTPATIENT)
Dept: CARDIOLOGY | Facility: MEDICAL CENTER | Age: 74
End: 2024-11-08
Payer: MEDICARE

## 2024-11-08 NOTE — TELEPHONE ENCOUNTER
Phone Number Called: [phone]    Call outcome:  Spoke to Rosemarie    Message: Called to follow up with patient and daughter. Last week his BP was 52 systolic and he was light headed and he had a headache and was weak. They took him to the ER and they told him he was dehydrated. They saw Dr Rose on Tuesday and he had him     They have checked his blood pressure and it has been low every day. He starts low and then improves some through out the day. His AM low Bps are before he takes his meds. Confirms he is not taking the furosemide or losartan after seeing Dr Rose. Is still taking carvedilol and spironolactone. It levels out after he takes his BP meds. First thing in the AM his SBP is around 70. Later in the day his SBP is between 90 and low 100's    They have been pushing him to drink more water. Right now he feels ok. Low BP mostly occurs when he first wakes up.     To BERTRAND: Please advise on his BP and medications

## 2024-11-08 NOTE — TELEPHONE ENCOUNTER
BERTRAND     Caller: Swapna (daughter)     Topic/issue: States she was speaking with Yulisa JENKINS however she gave the incorrect information. She said that her father was taking a medication when he is not.     Medication: carvedilol (COREG) 3.125 MG Tab     Callback Number: [phone]    Thank you,  Leilani SCALES

## 2024-11-08 NOTE — TELEPHONE ENCOUNTER
BERTRAND    Caller: Rosemarie Grimes (daughter)    Topic/issue: The patient's daughter called to report consistently low blood pressure readings. The most recent:    Date:11/8/24  BP: 72/52 (in clinic at physical therapy)    Callback Number: [phone]      Thank you,  Biju OQUENDO

## 2024-11-09 NOTE — PROGRESS NOTES
Provider Encounter- Diabetic Foot Ulcer      HISTORY OF PRESENT ILLNESS  Wound History:    START OF CARE IN CLINIC: 10/11/24    REFERRING PROVIDER: Narciso Rose MD     WOUND- Diabetic foot ulcer   LOCATION: Left great toe plantar     HISTORY: Patient was hospitalized in [facility] where he lives from 9/20/2024 - 9/30/2024 for sepsis and cellulitis to left thigh.  He was noted to have a left great toe ulcer during hospitalization.  Patient reports he did notice the ulcer initially around June 2024.  Did not perform any home care.  While hospitalized he was placed on IV antibiotics.  He did not have any imaging of the left foot per daughter.  He did have an ultrasound to rule out DVT which was negative.  He was discharged on 5 days of amoxicillin 500 mg 3 times daily which patient completed.  Was not seen by wound specialist while hospitalized.  Photo of wound from daughter shows eschar and callus on 10/5/2024.  Patient has moved to [place] and is now living with his daughter.  He has established with a new PCP, and has been referred to several specialists, including wound care.          Pertinent Medical History: DM, CHF     DIABETES HX: Diagnosed with type 2 diabetes in 2012, and is currently managing diabetes with orals.  Patient started taking diabetic medications and checking his blood sugars beginning of October 2024.    Checks blood sugars: 2x/day   Blood sugar average: 190-330   Has had previous diabetes education.    Does have numbness in feet.    Foot wear:nonprescriptive shoes.  Does not check feet routinely.    Previous foot ulcers: No   Previous foot surgery:no  Current occupation retired .    Offloading none.           TOBACCO USE:   Never.  Exposed to passive smoke  No history of heavy alcoholism, back surgeries, chemotherapy  Patient's problem list, allergies, and current medications reviewed and updated in Epic    Interval History:  10/11/2024: Initial wound evaluation, initial provider  Clinic visit with STEVE Qiu, KENNETH SHANNON.  Pt denies fevers, chills, nausea, vomiting.  Accompanied by daughter.  Moved from [facility] on 10/6/2024.To be established with home health for wound care, PT, OT, speech therapy.  Authorized for [facility].  Referral to pharmacal therapist placed.    10/17/2024 : Clinic visit with SAM Lozoya, STEVE, KENNETH PEREZ.  Patient is here today with one of his daughters.  He states he is feeling well.  Blood sugar today 171.  Daughter checked his lab results on her phone, A1c on 9/30 was 9.3%.  Will add to results in epic.  X-ray of toe completed last week, shows no definitive evidence of osteomyelitis.   Home health referral for wound care placed last visit was declined.  Per referral note, he was referred to [facility], but declined because he is already established with healthy living at home.  However, he is only receiving PT/OT services from [facility] [place] at this time.  I reordered [facility] with specification that he be referred to [facility] for wound care.   Per daughter, [facility] did not have offloading shoe available.  I provided a prescription to , but also directed them to an offloading insert they can purchase off of [employer] if unable to obtain offloading shoe from orthotics.    10/24/2024 : Clinic visit with SAM Lozoya, STEVE, KENNETH PEREZ.   Patient is here today with his daughter.  His wound measures about the same.  He was not able to obtain offloading shoe due to cost.  He has home PT recommended a different type of offloading shoe that he could purchase off the area that.  Daughter showed me example on her cell phone.  I agreed that this would be appropriate.  Patient normally wears slippers at home, and therefore offloading insert would not be appropriate.   Patient now has home health nursing, through healthy living at home.  His blood sugar today was 208.  He  commonly runs between 160 and 200.  Recently seen by his PCP, he is now on insulin.    10/31/2024: Clinic visit with Lara VARGAS, KELLY-BC, CWON, CFCN.  Pt denies fevers, chills, nausea, vomiting.  Blood sugar 155.  Wound is decreased in area.  Accompanied by daughter.    11/7/2024: Clinic visit with Jason Samson MD. Patient reports doing ok. Denies any acute complaints. He is accompanied by daughter. He has purchased post op shoe and offloading Pedifix insole which will be fitted for use today. Wound slightly improved.      REVIEW OF SYSTEMS:   Unchanged from previous clinic visit on 10/31/2024, except as documented in interval history above    PHYSICAL EXAMINATION:   /50   Pulse (!) 58   Temp 36 °C (96.8 °F) (Temporal)   Resp 17   SpO2 95%     Physical Exam  Vitals reviewed.   HENT:      Ears:      Comments: Hard of hearing     Mouth/Throat:      Comments: Missing teeth  Cardiovascular:      Rate and Rhythm: Normal rate.      Pulses: Normal pulses.      Comments: Left DP and PT pulses easily palpated, +2.  Foot warm  Pulmonary:      Effort: Pulmonary effort is normal.   Abdominal:      Palpations: Abdomen is soft.   Musculoskeletal:         General: Swelling present.      Comments: Localized swelling to left hallux improving   Skin:     General: Skin is warm.      Comments: Left plantar great toe ulcer: Wound slightly improved. Lack of granulation tissue, thin slough. No evidence of infection.   Neurological:      Mental Status: He is alert and oriented to person, place, and time.      Motor: Weakness present.      Comments: Feet are insensate.  Neuropathy extends from toes to knee bilaterally  - With monofilament sensed 0 out of 10 to left foot  -With monofilament sensed 1 out of 10 to right foot    Generalized weakness   Psychiatric:         Mood and Affect: Mood and affect normal.         Cognition and Memory: Memory normal.      Comments: Forgetful       Monofilament testing with a 10  gram force: sensation intact: decreased bilaterally  Visual Inspection: Feet with maceration, ulcers, fissures.  Pedal pulses: intact bilaterally     WOUND ASSESSMENT  Wound 10/11/24 Diabetic Ulcer Toe, Hallux Plantar Left (Active)   Wound Image    11/07/24 1638   Site Assessment Pink;Conrad 11/07/24 1638   Periwound Assessment Maceration;Callused 11/07/24 1638   Margins Unattached edges 11/07/24 1638   Drainage Amount Moderate 11/07/24 1638   Drainage Description Serosanguineous 11/07/24 1638   Treatments Cleansed;Provider debridement;Site care 11/07/24 1638   Offloading/DME Offloading Shoe 11/07/24 1638   Wound Cleansing Hypochlorus Acid 11/07/24 1638   Periwound Protectant No-sting Skin Prep 11/07/24 1638   Dressing Changed Changed 10/24/24 1500   Dressing Cleansing/Solutions Not Applicable 11/07/24 1638   Dressing Options Hydrofiber Silver Strip;Hydrofiber Silver;Nonadhesive Foam;Hypafix Tape;Tubigrip 11/07/24 1638   Dressing Change/Treatment Frequency Every 72 hrs, and As Needed 11/07/24 1638   Wound Team Following Weekly 10/24/24 1500   Non-staged Wound Description Full thickness 11/07/24 1638   Wound Length (cm) 0.5 cm 11/07/24 1638   Wound Width (cm) 1 cm 11/07/24 1638   Wound Depth (cm) 0.2 cm 11/07/24 1638   Wound Surface Area (cm^2) 0.5 cm^2 11/07/24 1638   Wound Volume (cm^3) 0.1 cm^3 11/07/24 1638   Post-Procedure Length (cm) 0.5 cm 11/07/24 1638   Post-Procedure Width (cm) 1 cm 11/07/24 1638   Post-Procedure Depth (cm) 0.3 cm 11/07/24 1638   Post-Procedure Surface Area (cm^2) 0.5 cm^2 11/07/24 1638   Post-Procedure Volume (cm^3) 0.15 cm^3 11/07/24 1638   Wound Healing % 31 11/07/24 1638   Tunneling (cm) 0 cm 11/07/24 1638   Undermining (cm) 0 cm 11/07/24 1638   Undermining of Wound, 1st Location From 4 o'clock;To 8 o'clock 10/31/24 1021   Wound Odor None 11/07/24 1638   Pulses Left;DP;PT;1+ 11/07/24 1638   Right Foot Monofilament 10-point exam (Sensate) 1/10 10/11/24 0900   Left Foot Monofilament 10-point  exam (Sensate) 0/10 10/11/24 0900   Exposed Structures None 11/07/24 1638   Number of days: 28     PROCEDURE: Excisional debridement of left plantar hallux wound  -2% viscous lidocaine applied topically to wound bed for approximately 5 minutes prior to debridement  -Curette used to debride wound bed, closed wound edges and periwound callus.  Excisional debridement was performed to remove devitalized tissue until healthy, bleeding tissue was visualized.   Entire surface of wound, 0.5 cm² debrided.  Tissue debrided into the subcutaneous layer.  Periwound callus, 2 cm², debrided to skin level, excising hyperkeratinized tissue.   - Bleeding controlled with manual pressure and silver nitrate.    - Wound care completed by wound RN, refer to flowsheet  - Patient tolerated the procedure well, without c/o pain or discomfort.       Pertinent Labs and Diagnostics:         Labs: See media tab    A1c:   Lab Results   Component Value Date/Time    HBA1C 9.3 (A) 09/30/2024 12:00 AM          IMAGING:   10/11/2024-x-ray of toes, left  FINDINGS:  There is soft tissue irregularity involving the plantar aspect of the great toe with high density material in this region. There is no definite fracture or dislocation. Mild changes of osteoarthrosis are noted.     IMPRESSION:     Plantar ulcer involving the great toe without definite radiographic evidence for osteomyelitis      VASCULAR STUDIES: No results found.    LAST  WOUND CULTURE:   Lab Results   Component Value Date/Time    CULTRSULT - (A) 10/11/2024 09:30 AM    CULTRSULT Staphylococcus aureus  Light growth   (A) 10/11/2024 09:30 AM           ASSESSMENT AND PLAN:     1. Diabetic ulcer of toe of left foot associated with type 2 diabetes mellitus, with fat layer exposed (HCC)    11/7/2024: Wound slightly improved.  - Excisional debridement performed today.  Medically necessary to promote wound healing.  - Patient to follow-up at wound care clinic weekly for assessment and debridement  -  Home health healthy living orders updated.  Also seen healthy living for PT OT  - Pedal pulses are palpable, indicating that he likely has adequate perfusion to heal this wound.  - X-ray was recently completed shows no evidence of osteomyelitis  - Patient purchased offloading shoe and Pedifix, fitted for use today.  - Continue tubigrip, edema has improved.    Wound care:Aquacel Ag, nonadhesive foam, Hypafix tape, Tubigrip      2. Poorly controlled type 2 diabetes mellitus with peripheral neuropathy (HCC)    11/7/2024:  - Patient does not recall most recent glucose, reports has been labile.  - Referral to diabetes pharmacotherapy placed on previous visit.  Has upcoming visit  - Patient to continue offloading in postop shoe and pedifix  - Patient states he is eating well, with emphasis on protein  - Patient advised to keep his blood sugars below 140 in order to optimize wound healing  - Referral to podiatry placed for routine foot nail care    3. Wound infection    11/7/2024:   - His wound does not appear to be infected today  -Monitor for signs and symptoms of infection at each visit    PATIENT EDUCATION  - Importance of tight glucose control for wound healing   - Implications of loss of protective sensation (LOPS) discussed with patient- including increased risk for amputation.  - Advised to check feet at least daily, moisturize feet, and to always wear protective foot wear.   -  Importance of offloading foot to assist with wound healing  - Advised pt not to trim nails or calluses, seek foot/nail care from podiatrist or certified foot/nail nurse  - Importance of adequate nutrition for wound healing    My total time spent caring for the patient on the day of the encounter was 20 minutes, reviewing history, assessment, counseling and education, and coordination of care including adjusting and fitting for Pedifix  This does not include time spent on separately billable procedures/tests.    Please note that this note  may have been created using voice recognition software. I have worked with technical experts from [facility] to optimize the interface.  I have made every reasonable attempt to correct obvious errors, but there may be errors of grammar and possibly content that I did not discover before finalizing the note.    N

## 2024-11-11 ENCOUNTER — PATIENT OUTREACH (OUTPATIENT)
Dept: HEALTH INFORMATION MANAGEMENT | Facility: OTHER | Age: 74
End: 2024-11-11
Payer: MEDICARE

## 2024-11-11 ENCOUNTER — APPOINTMENT (OUTPATIENT)
Dept: CARDIOLOGY | Facility: MEDICAL CENTER | Age: 74
End: 2024-11-11
Attending: INTERNAL MEDICINE
Payer: MEDICARE

## 2024-11-11 NOTE — TELEPHONE ENCOUNTER
Caller: Swapna - Daughter    Topic/issue: Due to Dr. Bermeo out of office today, is there someone else that can advise?  Daughter states her Dad just fell 30 minutes ago, and then fell again. He hit his head on the bathtub but states he is not in pain.    They already followed up with his primary and told to see Cardiology. His blood pressure has been under 100 for the pasts week.  This morning it was: BP 77/49    Opening on schedule with Dr. Bermeo for tomorrow 11/12/24 @ 9:00 am, scheduled patient. Please advise if something else is recommended.    Callback Number: [phone] - Swapna    Thank you,  Chandrkia DESOUZA

## 2024-11-11 NOTE — TELEPHONE ENCOUNTER
Called back pt's daughter Swapna and advised below per KELLY recommendations. She verbalized understanding and was appreciative of call.

## 2024-11-11 NOTE — PROGRESS NOTES
Community Health Worker Intake     Synopsis: CHW received referral from patient's PCP to help with home health services for CHF, diabetic control, wound care. Chw messaged referring provider informing care management could only provide list of in-home care aid services, as all other ordered care must come from his provider/providers. Pt's pcp understood and requested chw provide pt with list of in-home care service options.    Chw attempted to reach pt over phone, but daughter answered and mentioned the patient is currently receiving home health, wound care and physical therapy. Chw informed her about the in-home care aid resource services, that may be able to provide additional help with ADL's and other non-medical care needs. Swapna agreed to have this list provided to her for possible care. Chw informed her he would also provide his contact info for future or additional needs. She thanked me for the call and will reach out if needed.      In-home care resource list and chw contact info mailed out to the patient.

## 2024-11-11 NOTE — TELEPHONE ENCOUNTER
Called pt's daughter Swapna (confirmed contact on file). Pt was in ER 2 weeks ago for low BP and fall and was told he is dehydrated. Pt has been encouraged with fluids since. Family feels it's more than just dehydration that's causing the low BP. Pt has not taken BP medications so far today.    Currently, pt's readings below:  /71  HR 75    MAR reviewed, pt only has losartan and spironolactone that he is currently taking as far as BP meds. Pt is continuing Eliquis and atorvastatin as prescribed. Pt is not taking carvedilol or Lasix.    Discussed reported fall, she confirmed pt may have hit his head on the tub this morning. Advised UC/ER precautions as per MAR, pt is taking Eliquis for anticoagulation. Advised to monitor bruising/bleeding/swelling symptoms. She said pt has no c/o pain/headaches/light-headedness at this time.    Advised pt's daughter Swapna that we will reach out to care team/ADA for further review and recommendations since JI is out of office. Confirmed scheduled FV and location for  tomorrow 11/12/24. She verbalized understanding and was appreciative of call back.    To KELLY (ADA): please review, BERTRAND/care team OOO today. Pt holding BP meds in the mean time d/t hypotension and fall. ER precautions were advised, FV with JI tomorrow  11/12. Please advise if ok to continue holding meds in the mean time and if any further recommendations. Thank you!

## 2024-11-12 ENCOUNTER — OFFICE VISIT (OUTPATIENT)
Dept: CARDIOLOGY | Facility: MEDICAL CENTER | Age: 74
End: 2024-11-12
Attending: INTERNAL MEDICINE
Payer: MEDICARE

## 2024-11-12 VITALS
SYSTOLIC BLOOD PRESSURE: 60 MMHG | HEART RATE: 70 BPM | DIASTOLIC BLOOD PRESSURE: 30 MMHG | BODY MASS INDEX: 20.33 KG/M2 | HEIGHT: 70 IN | OXYGEN SATURATION: 100 % | WEIGHT: 142 LBS

## 2024-11-12 DIAGNOSIS — I48.0 PAROXYSMAL ATRIAL FIBRILLATION (HCC): ICD-10-CM

## 2024-11-12 DIAGNOSIS — I10 PRIMARY HYPERTENSION: ICD-10-CM

## 2024-11-12 DIAGNOSIS — Z79.01 CHRONIC ANTICOAGULATION: ICD-10-CM

## 2024-11-12 PROBLEM — I50.22 CHRONIC SYSTOLIC CONGESTIVE HEART FAILURE (HCC): Status: RESOLVED | Noted: 2024-10-18 | Resolved: 2024-11-12

## 2024-11-12 PROCEDURE — 3078F DIAST BP <80 MM HG: CPT | Performed by: INTERNAL MEDICINE

## 2024-11-12 PROCEDURE — 99214 OFFICE O/P EST MOD 30 MIN: CPT | Performed by: INTERNAL MEDICINE

## 2024-11-12 PROCEDURE — 3074F SYST BP LT 130 MM HG: CPT | Performed by: INTERNAL MEDICINE

## 2024-11-12 PROCEDURE — 99213 OFFICE O/P EST LOW 20 MIN: CPT | Performed by: INTERNAL MEDICINE

## 2024-11-12 ASSESSMENT — ENCOUNTER SYMPTOMS
DIZZINESS: 0
FEVER: 0
DEPRESSION: 0
EYES NEGATIVE: 1
CLAUDICATION: 0
WEAKNESS: 0
MUSCULOSKELETAL NEGATIVE: 1
DOUBLE VISION: 0
PALPITATIONS: 0
BRUISES/BLEEDS EASILY: 0
WEIGHT LOSS: 0
CHILLS: 0
RESPIRATORY NEGATIVE: 1
HEADACHES: 0
BLURRED VISION: 0
SHORTNESS OF BREATH: 0
VOMITING: 0
COUGH: 0
NEUROLOGICAL NEGATIVE: 1
NERVOUS/ANXIOUS: 0
PSYCHIATRIC NEGATIVE: 1
NAUSEA: 0
GASTROINTESTINAL NEGATIVE: 1
MYALGIAS: 0
ABDOMINAL PAIN: 0
CONSTITUTIONAL NEGATIVE: 1
CARDIOVASCULAR NEGATIVE: 1
FOCAL WEAKNESS: 0

## 2024-11-12 ASSESSMENT — FIBROSIS 4 INDEX: FIB4 SCORE: 1.16

## 2024-11-12 NOTE — PROGRESS NOTES
Chief Complaint   Patient presents with    Atrial Fibrillation    Hypertension    Congestive Heart Failure     F/V Dx: Chronic systolic congestive heart failure (HCC)          Subjective     Rogelio Padilla is a 74 y.o. male who presents today for follow up of chronic systolic congestive heart failure, atrial fibrillation on chronic anticoagulation therapy, hypertension and hyperlipidemia, medication change evaluation.    Since the patient's last visit on 10/18/24, he has been doing well clinically from cardiac standpoint. He admit to dizziness. He denies fatigue, chest pain, shortness of breath, palpitations, lower extremity edema, dizziness or syncope. On the last visit he was started on Eliquis and losartan, and is tolerating these medications well without side effects.     Past Medical History:   Diagnosis Date    Atrial fibrillation (HCC)     CHF (congestive heart failure) (HCC)     Diabetes mellitus type 2, noninsulin dependent (HCC)     Hyperlipidemia     Hypertension      Past Surgical History:   Procedure Laterality Date    HERNIA REPAIR       Family History   Problem Relation Age of Onset    Heart Attack Mother      Social History     Socioeconomic History    Marital status:      Spouse name: Not on file    Number of children: Not on file    Years of education: Not on file    Highest education level: Not on file   Occupational History    Not on file   Tobacco Use    Smoking status: Never     Passive exposure: Current    Smokeless tobacco: Never   Vaping Use    Vaping status: Never Used   Substance and Sexual Activity    Alcohol use: Not Currently     Alcohol/week: 3.6 oz     Types: 6 Cans of beer per week    Drug use: Never    Sexual activity: Not on file   Other Topics Concern    Not on file   Social History Narrative    Not on file     Social Drivers of Health     Financial Resource Strain: Not on file   Food Insecurity: Not on file   Transportation Needs: Not on file   Physical Activity:  Not on file   Stress: Not on file   Social Connections: Not on file   Intimate Partner Violence: Not on file   Housing Stability: Not on file     Allergies   Allergen Reactions    Metformin Diarrhea          Neosporin [Bacitracin-Polymyxin B] Rash          Penicillins Rash           (Medications reviewed.)  Outpatient Encounter Medications as of 11/12/2024   Medication Sig Dispense Refill    Continuous Glucose  (FREESTYLE HALEY 3 READER) Device 1 Units every day. Use daily for continuous blood glucose monitoring 1 Each 0    Continuous Glucose Sensor (FREESTYLE HALEY 3 SENSOR) Misc 1 Application every 14 days for 28 days. Apply 1 sensor every 14 days for continuous blood glucose monitoring 2 Each 5    apixaban (ELIQUIS) 5mg Tab Take 1 Tablet by mouth 2 times a day. 180 Tablet 3    losartan (COZAAR) 25 MG Tab Take 1 Tablet by mouth every day. 90 Tablet 3    insulin glargine 100 UNIT/ML Solution Pen-injector injection Inject 10 Units under the skin every evening for 180 days. 9 mL 1    Blood Glucose Meter Kit Test blood sugar as recommended by provider. Embrace pro blood glucose monitoring kit. 1 Kit 0    Blood Glucose Test Strips Use one Embrace pro strip to test blood sugar three times daily. 100 Strip 0    Lancets Use one Embrace pro lancet to test blood sugar three times daily. 100 Each 0    Alcohol Swabs Wipe site with prep pad prior to injection. 100 Each 2    Insulin Pen Needle 32 G x 4 mm Use one pen needle in pen device to inject insulin once daily. 100 Each 3    sennosides (SENOKOT) 8.6 MG Tab Take 17.2 mg by mouth every day.      diclofenac sodium (VOLTAREN) 1 % Gel Apply 2 g topically 4 times a day as needed.      sertraline (ZOLOFT) 25 MG tablet Take 1 Tablet by mouth every day. 30 Tablet 11    Canagliflozin 100 MG Tab Take 100 mg by mouth every day. 30 Tablet 1    Dulaglutide (TRULICITY) 0.75 MG/0.5ML Solution Pen-injector Inject 0.5 mL under the skin every 7 days. (Patient not taking: Reported on  "11/12/2024) 2 mL 1    furosemide (LASIX) 40 MG Tab Take 1 Tablet by mouth every day. (Patient not taking: Reported on 11/6/2024)      atorvastatin (LIPITOR) 40 MG Tab Take 1 Tablet by mouth every day. (Patient not taking: Reported on 11/12/2024) 90 Tablet 3    spironolactone (ALDACTONE) 25 MG Tab Take 1 Tablet by mouth every day. (Patient not taking: Reported on 11/12/2024) 90 Tablet 3    carvedilol (COREG) 3.125 MG Tab Take 3.125 mg by mouth 2 times a day. (Patient not taking: Reported on 11/12/2024)       No facility-administered encounter medications on file as of 11/12/2024.     Review of Systems   Constitutional: Negative.  Negative for chills, fever, malaise/fatigue and weight loss.   HENT: Negative.  Negative for hearing loss.    Eyes: Negative.  Negative for blurred vision and double vision.   Respiratory: Negative.  Negative for cough and shortness of breath.    Cardiovascular: Negative.  Negative for chest pain, palpitations, claudication and leg swelling.   Gastrointestinal: Negative.  Negative for abdominal pain, nausea and vomiting.   Genitourinary: Negative.  Negative for dysuria and urgency.   Musculoskeletal: Negative.  Negative for joint pain and myalgias.   Skin: Negative.  Negative for itching and rash.   Neurological: Negative.  Negative for dizziness, focal weakness, weakness and headaches.   Endo/Heme/Allergies: Negative.  Does not bruise/bleed easily.   Psychiatric/Behavioral: Negative.  Negative for depression. The patient is not nervous/anxious.               Objective     BP (!) 60/30 (BP Location: Left arm, Patient Position: Sitting, BP Cuff Size: Adult)   Pulse 70   Ht 1.778 m (5' 10\")   Wt 64.4 kg (142 lb)   SpO2 100%   BMI 20.37 kg/m²     Physical Exam  Constitutional:       Appearance: Normal appearance. He is well-developed and normal weight.   HENT:      Head: Normocephalic and atraumatic.   Neck:      Vascular: No JVD.   Cardiovascular:      Rate and Rhythm: Normal rate and " regular rhythm.      Heart sounds: Normal heart sounds.   Pulmonary:      Effort: Pulmonary effort is normal.      Breath sounds: Normal breath sounds.   Abdominal:      General: Bowel sounds are normal.      Palpations: Abdomen is soft.      Comments: No hepatosplenomegaly.   Musculoskeletal:         General: Normal range of motion.   Lymphadenopathy:      Cervical: No cervical adenopathy.   Skin:     General: Skin is warm and dry.   Neurological:      Mental Status: He is alert and oriented to person, place, and time.            CARDIAC STUDIES/PROCEDURES:    ECHOCARDIOGRAM CONCLUSIONS (10/25/24)  No prior study is available for comparison.   Normal left ventricular systolic function.  The left ventricular ejection fraction is visually estimated to be 55-60%.  Mild mitral regurgitation.  Mild aortic insufficiency.  Mild tricuspid regurgitation.  Estimated right ventricular systolic pressure is 35 mmHg.  The ascending aorta is borderline dilated with a diameter of 4.0 cm.  (study result reviewed)      ECHOCARDIOGRAM CONCLUSIONS in [facility] (09/20/24)  Echocardiogram showing ejection fraction of 40%.    EKG performed on (11/01/24) was reviewed: EKG personally interpreted shows sinus rhythm.   EKG performed on (10/18/24) EKG shows sinus rhythm.   EKG performed on (09/24/24) EKG shows atrial fibrillation.     MYOCARDIAL PERFUSION STUDY CONCLUSIONS (10/24/24)  NUCLEAR IMAGING INTERPRETATION  Normal Lexiscan myocardial perfusion study.  No evidence of ischemia or infarct.  Diaphragmatic artifact.  SDS 2.  LVEF 63%.  No ischemic changes with Regadenoson.  No arrhythmias.  ECG INTERPRETATION  Negative stress ECG for ischemia.  (study result reviewed)    Assessment & Plan     1. Paroxysmal atrial fibrillation (HCC)        2. Chronic anticoagulation        3. Primary hypertension            Medical Decision Making: Today's Assessment/Status/Plan:        Prior congestive heart failure and cardiomyopathy:  He is a 74 year old male with prior chronic systolic congestive heart failure diagnosed in [place], atrial fibrillation on chronic anticoagulation therapy, hypertension and hyperlipidemia. The overall volume status is adequate.   Atrial fibrillation on anticoagulation therapy (Eliquis): He is doing well on anticoagulation without palpitations.  Hypertension: Currently, the overall blood pressure has been low. We will discontinue medications including blood pressure and diuretic therapy and continue to monitor the blood pressure.  Hyperlipidemia: He is doing well on statin therapy without myalgia symptoms.    We will follow up in 3 months.    CCDr. Narciso Rose

## 2024-11-14 ENCOUNTER — APPOINTMENT (OUTPATIENT)
Dept: WOUND CARE | Facility: MEDICAL CENTER | Age: 74
End: 2024-11-14
Payer: MEDICARE

## 2024-11-21 ENCOUNTER — OFFICE VISIT (OUTPATIENT)
Dept: WOUND CARE | Facility: MEDICAL CENTER | Age: 74
End: 2024-11-21
Payer: MEDICARE

## 2024-11-21 VITALS
RESPIRATION RATE: 17 BRPM | OXYGEN SATURATION: 97 % | DIASTOLIC BLOOD PRESSURE: 58 MMHG | SYSTOLIC BLOOD PRESSURE: 110 MMHG | TEMPERATURE: 97.5 F | HEART RATE: 71 BPM

## 2024-11-21 DIAGNOSIS — L08.9 WOUND INFECTION: ICD-10-CM

## 2024-11-21 DIAGNOSIS — E11.65 POORLY CONTROLLED TYPE 2 DIABETES MELLITUS WITH PERIPHERAL NEUROPATHY (HCC): ICD-10-CM

## 2024-11-21 DIAGNOSIS — T14.8XXA WOUND INFECTION: ICD-10-CM

## 2024-11-21 DIAGNOSIS — E11.621 DIABETIC ULCER OF TOE OF LEFT FOOT ASSOCIATED WITH TYPE 2 DIABETES MELLITUS, WITH FAT LAYER EXPOSED (HCC): ICD-10-CM

## 2024-11-21 DIAGNOSIS — L97.522 DIABETIC ULCER OF TOE OF LEFT FOOT ASSOCIATED WITH TYPE 2 DIABETES MELLITUS, WITH FAT LAYER EXPOSED (HCC): ICD-10-CM

## 2024-11-21 DIAGNOSIS — E11.42 POORLY CONTROLLED TYPE 2 DIABETES MELLITUS WITH PERIPHERAL NEUROPATHY (HCC): ICD-10-CM

## 2024-11-21 PROCEDURE — 11042 DBRDMT SUBQ TIS 1ST 20SQCM/<: CPT | Performed by: NURSE PRACTITIONER

## 2024-11-21 PROCEDURE — 99213 OFFICE O/P EST LOW 20 MIN: CPT | Mod: 25 | Performed by: NURSE PRACTITIONER

## 2024-11-21 PROCEDURE — 3078F DIAST BP <80 MM HG: CPT | Performed by: NURSE PRACTITIONER

## 2024-11-21 PROCEDURE — 11042 DBRDMT SUBQ TIS 1ST 20SQCM/<: CPT

## 2024-11-21 PROCEDURE — 99213 OFFICE O/P EST LOW 20 MIN: CPT

## 2024-11-21 PROCEDURE — 3074F SYST BP LT 130 MM HG: CPT | Performed by: NURSE PRACTITIONER

## 2024-11-21 NOTE — PROGRESS NOTES
Provider Encounter- Diabetic Foot Ulcer      HISTORY OF PRESENT ILLNESS  Wound History:    START OF CARE IN CLINIC: 10/11/24    REFERRING PROVIDER: Narciso Rose MD     WOUND- Diabetic foot ulcer   LOCATION: Left great toe plantar     HISTORY: Patient was hospitalized in [facility] where he lives from 9/20/2024 - 9/30/2024 for sepsis and cellulitis to left thigh.  He was noted to have a left great toe ulcer during hospitalization.  Patient reports he did notice the ulcer initially around June 2024.  Did not perform any home care.  While hospitalized he was placed on IV antibiotics.  He did not have any imaging of the left foot per daughter.  He did have an ultrasound to rule out DVT which was negative.  He was discharged on 5 days of amoxicillin 500 mg 3 times daily which patient completed.  Was not seen by wound specialist while hospitalized.  Photo of wound from daughter shows eschar and callus on 10/5/2024.  Patient has moved to [place] and is now living with his daughter.  He has established with a new PCP, and has been referred to several specialists, including wound care.          Pertinent Medical History: DM, CHF     DIABETES HX: Diagnosed with type 2 diabetes in 2012, and is currently managing diabetes with orals.  Patient started taking diabetic medications and checking his blood sugars beginning of October 2024.    Checks blood sugars: 2x/day   Blood sugar average: 190-330   Has had previous diabetes education.    Does have numbness in feet.    Foot wear:nonprescriptive shoes.  Does not check feet routinely.    Previous foot ulcers: No   Previous foot surgery:no  Current occupation retired .    Offloading none.           TOBACCO USE:   Never.  Exposed to passive smoke  No history of heavy alcoholism, back surgeries, chemotherapy  Patient's problem list, allergies, and current medications reviewed and updated in Epic    Interval History:  10/11/2024: Initial wound evaluation, initial provider  Clinic visit with STEVE Qiu, KENNETH SHANNON.  Pt denies fevers, chills, nausea, vomiting.  Accompanied by daughter.  Moved from [facility] on 10/6/2024.To be established with home health for wound care, PT, OT, speech therapy.  Authorized for [facility].  Referral to pharmacal therapist placed.    10/17/2024 : Clinic visit with SAM Lozoya, STEVE, KENNETH PEREZ.  Patient is here today with one of his daughters.  He states he is feeling well.  Blood sugar today 171.  Daughter checked his lab results on her phone, A1c on 9/30 was 9.3%.  Will add to results in epic.  X-ray of toe completed last week, shows no definitive evidence of osteomyelitis.   Home health referral for wound care placed last visit was declined.  Per referral note, he was referred to [facility], but declined because he is already established with healthy living at home.  However, he is only receiving PT/OT services from [facility] at this time.  I reordered [facility] with specification that he be referred to [facility] for wound care.   Per daughter, [facility] did not have offloading shoe available.  I provided a prescription to , but also directed them to an offloading insert they can purchase off of [employer] if unable to obtain offloading shoe from orthotics.    10/24/2024 : Clinic visit with SAM Lozoya, STEVE, KENNETH PEREZ.   Patient is here today with his daughter.  His wound measures about the same.  He was not able to obtain offloading shoe due to cost.  He has home PT recommended a different type of offloading shoe that he could purchase off the area that.  Daughter showed me example on her cell phone.  I agreed that this would be appropriate.  Patient normally wears slippers at home, and therefore offloading insert would not be appropriate.   Patient now has home health nursing, through [facility] home.  His blood sugar today was 208.  He  commonly runs between 160 and 200.  Recently seen by his PCP, he is now on insulin.    10/31/2024: Clinic visit with STEVE Qiu, MIRTHA, KENNETH.  Pt denies fevers, chills, nausea, vomiting.  Blood sugar 155.  Wound is decreased in area.  Accompanied by daughter.    11/7/2024: Clinic visit with Jason Samson MD. Patient reports doing ok. Denies any acute complaints. He is accompanied by daughter. He has purchased post op shoe and offloading Pedifix insole which will be fitted for use today. Wound slightly improved.    11/21/2024 : Clinic visit with SAM Lozoya FNP-BC, ANA, KENNETH.   Patient is here today with his daughter.  He states he is feeling well overall.  He reports his blood sugar today was 250, 300 last night.  States that sugars range widely.  Per daughter, cardiologist recently adjusted several of his medications.  He was also recently seen by pharmacotherapy services.  Referral placed to endocrinology       REVIEW OF SYSTEMS:   Unchanged from previous clinic visit on 11/7/2024 except as documented in interval history above    PHYSICAL EXAMINATION:   /58   Pulse 71   Temp 36.4 °C (97.5 °F) (Temporal)   Resp 17   SpO2 97%     Physical Exam  Vitals reviewed.   HENT:      Ears:      Comments: Hard of hearing     Mouth/Throat:      Comments: Missing teeth  Cardiovascular:      Rate and Rhythm: Normal rate.      Pulses: Normal pulses.      Comments: Left DP and PT pulses easily palpated, +2.  Foot warm  Pulmonary:      Effort: Pulmonary effort is normal.   Abdominal:      Palpations: Abdomen is soft.   Musculoskeletal:         General: Swelling present.      Comments: Localized swelling to left hallux improving   Skin:     General: Skin is warm.      Comments: Left plantar great toe ulcer: Wound area has decreased.  Thin layer of slough to wound bed.  Send callus periwound.  No evidence of infection   Neurological:      Mental Status: He is alert and oriented to person,  place, and time.      Motor: Weakness present.      Comments: Feet are insensate.  Neuropathy extends from toes to knee bilaterally      Generalized weakness   Psychiatric:         Mood and Affect: Mood and affect normal.         Cognition and Memory: Memory normal.      Comments: Forgetful       Monofilament testing with a 10 gram force: sensation intact: decreased bilaterally  Visual Inspection: Feet with maceration, ulcers, fissures.  Pedal pulses: intact bilaterally     WOUND ASSESSMENT  Wound 10/11/24 Diabetic Ulcer Toe, Hallux Plantar Left (Active)   Wound Image   11/21/24 0957   Site Assessment Kings 11/21/24 0957   Periwound Assessment Callused 11/21/24 0957   Margins Attached edges 11/21/24 0957   Drainage Amount Scant 11/21/24 0957   Drainage Description Serous 11/21/24 0957   Treatments Cleansed;Topical Lidocaine;Provider debridement;Site care 11/21/24 0957   Offloading/DME Offloading Shoe 11/21/24 0957   Wound Cleansing Hypochlorus Acid 11/21/24 0957   Periwound Protectant Skin Protectant Wipes to Periwound 11/21/24 0957   Dressing Changed Changed 10/24/24 1500   Dressing Cleansing/Solutions Not Applicable 11/21/24 0957   Dressing Options Hydrofiber Silver;Nonadhesive Foam;Hypafix Tape;Tubigrip 11/21/24 0957   Dressing Change/Treatment Frequency Every 72 hrs, and As Needed 11/21/24 0957   Wound Team Following Bi-Monthly 11/21/24 0957   Non-staged Wound Description Full thickness 11/21/24 0957   Wound Length (cm) 0.5 cm 11/07/24 1638   Wound Width (cm) 1 cm 11/07/24 1638   Wound Depth (cm) 0.2 cm 11/07/24 1638   Wound Surface Area (cm^2) 0.5 cm^2 11/07/24 1638   Wound Volume (cm^3) 0.1 cm^3 11/07/24 1638   Post-Procedure Length (cm) 0.5 cm 11/07/24 1638   Post-Procedure Width (cm) 1 cm 11/07/24 1638   Post-Procedure Depth (cm) 0.3 cm 11/07/24 1638   Post-Procedure Surface Area (cm^2) 0.5 cm^2 11/07/24 1638   Post-Procedure Volume (cm^3) 0.15 cm^3 11/07/24 1638   Wound Healing % 31 11/07/24 1638   Tunneling  (cm) 0 cm 11/21/24 0957   Undermining (cm) 0 cm 11/21/24 0957   Undermining of Wound, 1st Location From 4 o'clock;To 8 o'clock 10/31/24 1021   Wound Odor None 11/21/24 0957   Pulses Left;DP;PT;1+ 11/07/24 1638   Right Foot Monofilament 10-point exam (Sensate) 1/10 10/11/24 0900   Left Foot Monofilament 10-point exam (Sensate) 0/10 10/11/24 0900   Exposed Structures None 11/21/24 0957   Number of days: 41     PROCEDURE: Excisional debridement of left plantar hallux wound  -2% viscous lidocaine applied topically to wound bed for approximately 5 minutes prior to debridement  -Curette used to debride wound bed, closed wound edges and periwound callus.  Excisional debridement was performed to remove devitalized tissue until healthy, bleeding tissue was visualized.  Total area debrided was approximately 1.0 cm² (post debridement measurements not taken).  Tissue debrided into the subcutaneous layer.  Periwound callus, 1.5 cm², debrided to skin level, excising hyperkeratinized tissue.   - Bleeding controlled with manual pressure and silver nitrate.    - Wound care completed by wound RN, refer to flowsheet  - Patient tolerated the procedure well, without c/o pain or discomfort.       Pertinent Labs and Diagnostics:         Labs: See media tab    A1c:   Lab Results   Component Value Date/Time    HBA1C 9.3 (A) 09/30/2024 12:00 AM          IMAGING:   10/11/2024-x-ray of toes, left  FINDINGS:  There is soft tissue irregularity involving the plantar aspect of the great toe with high density material in this region. There is no definite fracture or dislocation. Mild changes of osteoarthrosis are noted.     IMPRESSION:     Plantar ulcer involving the great toe without definite radiographic evidence for osteomyelitis      VASCULAR STUDIES: No results found.    LAST  WOUND CULTURE:   Lab Results   Component Value Date/Time    CULTRSULT - (A) 10/11/2024 09:30 AM    CULTRSULT Staphylococcus aureus  Light growth   (A) 10/11/2024 09:30  AM           ASSESSMENT AND PLAN:     1. Diabetic ulcer of toe of left foot associated with type 2 diabetes mellitus, with fat layer exposed (LTAC, located within [facility])    11/21/2024: Wound area has decreased  - Excisional debridement performed today.  Medically necessary to promote wound healing.  - Patient to follow-up at wound care clinic weekly for assessment and debridement  - Home health healthy living orders updated.  Also seen healthy living for PT OT  - Pedal pulses are palpable, indicating that he likely has adequate perfusion to heal this wound.  - X-ray was recently completed shows no evidence of osteomyelitis  - Patient purchased offloading shoe and Pedifix.  He did wear it to the clinic today  - Continue tubigrip, edema has improved.    Wound care:Aquacel Ag, nonadhesive foam, Hypafix tape, Tubigrip      2. Poorly controlled type 2 diabetes mellitus with peripheral neuropathy (LTAC, located within [facility])    11/21/2024: Patient states his blood sugar this morning was 250, and 300 last night.  States his sugars range widely  .  - Patient has already been seen by pharmacotherapy service.  States they were helpful  - Patient to continue offloading in postop shoe and pedifix  - Patient states he is eating well, with emphasis on protein  - Patient advised to keep his blood sugars below 140 in order to optimize wound healing  - Referral to endocrinology    3. Wound infection    11/21/2024: His wound does not appear to be infected today  - His wound does not appear to be infected today  -Monitor for signs and symptoms of infection at each visit    PATIENT EDUCATION  - Importance of tight glucose control for wound healing   - Implications of loss of protective sensation (LOPS) discussed with patient- including increased risk for amputation.  - Advised to check feet at least daily, moisturize feet, and to always wear protective foot wear.   -  Importance of offloading foot to assist with wound healing  - Advised pt not to trim nails or calluses, seek  foot/nail care from podiatrist or certified foot/nail nurse  - Importance of adequate nutrition for wound healing    My total time spent caring for the patient on the day of the encounter was 20 minutes.   This does not include time spent on separately billable procedures/tests.     Please note that this note may have been created using voice recognition software. I have worked with technical experts from [facility] to optimize the interface.  I have made every reasonable attempt to correct obvious errors, but there may be errors of grammar and possibly content that I did not discover before finalizing the note.    N

## 2024-11-21 NOTE — PATIENT INSTRUCTIONS
-Keep dressings clean and dry. Change dressings every 3-4 days, and if the dressings become saturated, soiled, or fall off.    -If you need to change your dressings at home: Wash your wound with normal saline, wound cleanser, or unscented soap and water prior to applying your new dressings. Please avoid cleansing with hydrogen peroxide or rubbing alcohol. Hydrogen peroxide and rubbing alcohol are toxic to new tissue and skin cells.    -Avoid prolonged standing or sitting without elevating your legs.    -Remove your compression garments if you have severe pain, severe swelling, numbness, color change, or temperature change in your toes. If you need to remove your compression garments, do so by unrolling them. Do not cut the compression garments off, this is to prevent cutting yourself on accident.    -Never walk around the house barefoot. Always wear a rubber soled slipper when walking around the house.    -Wear your offloading shoe any time you are up walking.    -Should you experience any significant changes in your wound(s), such as signs of infection (increasing redness, swelling, localized heat, increased pain, fever > 101 F, chills) or have any questions regarding your home care instructions, please contact the wound center at [phone]. If after hours, contact your primary care physician or go to the hospital emergency room.     -If you are admitted to any hospital, you will need a new referral to come back to the wound clinic and any scheduled appointments that you already have, may be cancelled.     -If you are 5 or more minutes late for an appointment, we reserve the right to cancel and reschedule that appointment. Additionally, if you are habitually late or not showing (3 late cancellations and/or no shows), we reserve the right to cancel your remaining appointments and it will be your responsibility to obtain a new referral if services are still needed.

## 2024-11-21 NOTE — PROGRESS NOTES
Updated [facility] health [facility] placed [facility]

## 2024-11-26 ENCOUNTER — APPOINTMENT (OUTPATIENT)
Dept: WOUND CARE | Facility: MEDICAL CENTER | Age: 74
End: 2024-11-26
Payer: MEDICARE

## 2024-11-28 NOTE — TELEPHONE ENCOUNTER
Received request via: Pharmacy    Was the patient seen in the last year in this department? Yes    Does the patient have an active prescription (recently filled or refills available) for medication(s) requested? No    Pharmacy Name: [facility]    Does the patient have care home Plus and need 100-day supply? (This applies to ALL medications) Patient does not have SCP

## 2024-11-29 DIAGNOSIS — E11.65 POORLY CONTROLLED TYPE 2 DIABETES MELLITUS WITH PERIPHERAL NEUROPATHY (HCC): ICD-10-CM

## 2024-11-29 DIAGNOSIS — E11.42 POORLY CONTROLLED TYPE 2 DIABETES MELLITUS WITH PERIPHERAL NEUROPATHY (HCC): ICD-10-CM

## 2024-12-02 DIAGNOSIS — E11.42 POORLY CONTROLLED TYPE 2 DIABETES MELLITUS WITH PERIPHERAL NEUROPATHY (HCC): ICD-10-CM

## 2024-12-02 DIAGNOSIS — E11.65 POORLY CONTROLLED TYPE 2 DIABETES MELLITUS WITH PERIPHERAL NEUROPATHY (HCC): ICD-10-CM

## 2024-12-02 NOTE — TELEPHONE ENCOUNTER
Received request via: Pharmacy    Was the patient seen in the last year in this department? Yes    Does the patient have an active prescription (recently filled or refills available) for medication(s) requested? No    Pharm[facility]:   [id number] - [place] - [address]       Does the patient have residential Plus and need 100-day supply? (This applies to ALL medications) Patient does not have SCP

## 2024-12-03 ENCOUNTER — TELEPHONE (OUTPATIENT)
Dept: WOUND CARE | Facility: MEDICAL CENTER | Age: 74
End: 2024-12-03

## 2024-12-03 ENCOUNTER — OFFICE VISIT (OUTPATIENT)
Dept: WOUND CARE | Facility: MEDICAL CENTER | Age: 74
End: 2024-12-03
Payer: MEDICARE

## 2024-12-03 VITALS
SYSTOLIC BLOOD PRESSURE: 92 MMHG | RESPIRATION RATE: 16 BRPM | TEMPERATURE: 96.7 F | OXYGEN SATURATION: 100 % | HEART RATE: 75 BPM | DIASTOLIC BLOOD PRESSURE: 52 MMHG

## 2024-12-03 DIAGNOSIS — E11.65 POORLY CONTROLLED TYPE 2 DIABETES MELLITUS WITH PERIPHERAL NEUROPATHY (HCC): ICD-10-CM

## 2024-12-03 DIAGNOSIS — E11.621 DIABETIC ULCER OF TOE OF LEFT FOOT ASSOCIATED WITH TYPE 2 DIABETES MELLITUS, UNSPECIFIED ULCER STAGE (HCC): ICD-10-CM

## 2024-12-03 DIAGNOSIS — L08.9 WOUND INFECTION: ICD-10-CM

## 2024-12-03 DIAGNOSIS — E11.621 DIABETIC ULCER OF TOE OF LEFT FOOT ASSOCIATED WITH TYPE 2 DIABETES MELLITUS, WITH FAT LAYER EXPOSED (HCC): ICD-10-CM

## 2024-12-03 DIAGNOSIS — L97.522 DIABETIC ULCER OF TOE OF LEFT FOOT ASSOCIATED WITH TYPE 2 DIABETES MELLITUS, WITH FAT LAYER EXPOSED (HCC): ICD-10-CM

## 2024-12-03 DIAGNOSIS — T14.8XXA WOUND INFECTION: ICD-10-CM

## 2024-12-03 DIAGNOSIS — E11.42 POORLY CONTROLLED TYPE 2 DIABETES MELLITUS WITH PERIPHERAL NEUROPATHY (HCC): ICD-10-CM

## 2024-12-03 DIAGNOSIS — L97.529 DIABETIC ULCER OF TOE OF LEFT FOOT ASSOCIATED WITH TYPE 2 DIABETES MELLITUS, UNSPECIFIED ULCER STAGE (HCC): ICD-10-CM

## 2024-12-03 PROCEDURE — 99213 OFFICE O/P EST LOW 20 MIN: CPT

## 2024-12-03 PROCEDURE — 3074F SYST BP LT 130 MM HG: CPT | Performed by: NURSE PRACTITIONER

## 2024-12-03 PROCEDURE — 3078F DIAST BP <80 MM HG: CPT | Performed by: NURSE PRACTITIONER

## 2024-12-03 PROCEDURE — 99213 OFFICE O/P EST LOW 20 MIN: CPT | Performed by: NURSE PRACTITIONER

## 2024-12-03 RX ORDER — INSULIN GLARGINE 100 [IU]/ML
10 INJECTION, SOLUTION SUBCUTANEOUS EVERY EVENING
Qty: 9 ML | Refills: 1 | Status: SHIPPED | OUTPATIENT
Start: 2024-12-03 | End: 2025-06-01

## 2024-12-03 RX ORDER — BLOOD SUGAR DIAGNOSTIC
STRIP MISCELLANEOUS
Qty: 100 STRIP | Refills: 5 | Status: SHIPPED | OUTPATIENT
Start: 2024-12-03 | End: 2024-12-06 | Stop reason: SDUPTHER

## 2024-12-03 RX ORDER — LANCETS 30 GAUGE
EACH MISCELLANEOUS
Qty: 100 EACH | Refills: 0 | Status: SHIPPED | OUTPATIENT
Start: 2024-12-03

## 2024-12-03 NOTE — TELEPHONE ENCOUNTER
Received request via: Pharmacy    Was the patient seen in the last year in this department? Yes    Does the patient have an active prescription (recently filled or refills available) for medication(s) requested? No    Ph[facility]:   [id number] - [place] - [address]       Does the patient have MCFP Plus and need 100-day supply? (This applies to ALL medications) Patient does not have SCP

## 2024-12-03 NOTE — PROGRESS NOTES
[facility] informed of patient discharge due to wound resolution vis [facility].     [facility] script scanned into [facility].

## 2024-12-03 NOTE — TELEPHONE ENCOUNTER
Refaxed wound care orders to [facility], [phone]. Patient is discharged from clinic due to wound resolution.

## 2024-12-03 NOTE — PROGRESS NOTES
Provider Encounter- Diabetic Foot Ulcer      HISTORY OF PRESENT ILLNESS  Wound History:    START OF CARE IN CLINIC: 10/11/24    REFERRING PROVIDER: Narciso Rose MD     WOUND- Diabetic foot ulcer   LOCATION: Left great toe plantar     HISTORY: Patient was hospitalized in [facility] where he lives from 9/20/2024 - 9/30/2024 for sepsis and cellulitis to left thigh.  He was noted to have a left great toe ulcer during hospitalization.  Patient reports he did notice the ulcer initially around June 2024.  Did not perform any home care.  While hospitalized he was placed on IV antibiotics.  He did not have any imaging of the left foot per daughter.  He did have an ultrasound to rule out DVT which was negative.  He was discharged on 5 days of amoxicillin 500 mg 3 times daily which patient completed.  Was not seen by wound specialist while hospitalized.  Photo of wound from daughter shows eschar and callus on 10/5/2024.  Patient has moved to [place] and is now living with his daughter.  He has established with a new PCP, and has been referred to several specialists, including wound care.          Pertinent Medical History: DM, CHF     DIABETES HX: Diagnosed with type 2 diabetes in 2012, and is currently managing diabetes with orals.  Patient started taking diabetic medications and checking his blood sugars beginning of October 2024.    Checks blood sugars: 2x/day   Blood sugar average: 190-330   Has had previous diabetes education.    Does have numbness in feet.    Foot wear:nonprescriptive shoes.  Does not check feet routinely.    Previous foot ulcers: No   Previous foot surgery:no  Current occupation retired .    Offloading none.           TOBACCO USE:   Never.  Exposed to passive smoke  No history of heavy alcoholism, back surgeries, chemotherapy  Patient's problem list, allergies, and current medications reviewed and updated in Epic    Interval History:  10/11/2024: Initial wound evaluation, initial provider  Clinic visit with STEVE Qiu, KENNETH SHANNON.  Pt denies fevers, chills, nausea, vomiting.  Accompanied by daughter.  Moved from [facility] on 10/6/2024.To be established with home health for wound care, PT, OT, speech therapy.  Authorized for [facility].  Referral to pharmacal therapist placed.    10/17/2024 : Clinic visit with SAM Lozoya, STEVE, KENNETH PEREZ.  Patient is here today with one of his daughters.  He states he is feeling well.  Blood sugar today 171.  Daughter checked his lab results on her phone, A1c on 9/30 was 9.3%.  Will add to results in epic.  X-ray of toe completed last week, shows no definitive evidence of osteomyelitis.   Home health referral for wound care placed last visit was declined.  Per referral note, he was referred to [facility], but declined because he is already established with [facility] home.  However, he is only receiving PT/OT services from [facility] at this time.  I reordered [facility] with specification that he be referred to [facility] for wound care.   Per daughter, [facility] did not have offloading shoe available.  I provided a prescription to , but also directed them to an offloading insert they can purchase off of [employer] if unable to obtain offloading shoe from orthotics.    10/24/2024 : Clinic visit with SAM Lozoya, STEVE, KENNETH PEREZ.   Patient is here today with his daughter.  His wound measures about the same.  He was not able to obtain offloading shoe due to cost.  He has home PT recommended a different type of offloading shoe that he could purchase off the area that.  Daughter showed me example on her cell phone.  I agreed that this would be appropriate.  Patient normally wears slippers at home, and therefore offloading insert would not be appropriate.   Patient now has home health nursing, through [facility].  His blood sugar today was 208.  He  commonly runs between 160 and 200.  Recently seen by his PCP, he is now on insulin.    10/31/2024: Clinic visit with STEVE Qiu, KENNETH SHANNON.  Pt denies fevers, chills, nausea, vomiting.  Blood sugar 155.  Wound is decreased in area.  Accompanied by daughter.    11/7/2024: Clinic visit with Jason Samson MD. Patient reports doing ok. Denies any acute complaints. He is accompanied by daughter. He has purchased post op shoe and offloading Pedifix insole which will be fitted for use today. Wound slightly improved.    11/21/2024 : Clinic visit with SAM Lozoya FNP-BC, KENNETH PEREZ.   Patient is here today with his daughter.  He states he is feeling well overall.  He reports his blood sugar today was 250, 300 last night.  States that sugars range widely.  Per daughter, cardiologist recently adjusted several of his medications.  He was also recently seen by pharmacotherapy services.  Referral placed to endocrinology     12/3/2024 : Clinic visit with SAM Lozoya FNP-BC, KENNETH PEREZ.   Patient is here again today with his daughter.  Wound presents with stained callus, no underlying wound noted after debridement of callus.  Rx for orthotic shoes and inserts provided.  Patient has not checked his blood sugars for several days, ran out of strips.  Referral to endocrinology placed last visit, and has been authorized.  He was provided with contact information for [facility] and advised to call make an appointment ASAP.   Patient is discharged from [facility].  He understands he is to return to clinic immediately if wound recurs or if he develops new wounds.  He also understands he would require a new referral.      REVIEW OF SYSTEMS:   Unchanged from previous clinic visit on 11/21/2024 except as documented in interval history above    PHYSICAL EXAMINATION:   BP 92/52   Pulse 75   Temp 35.9 °C (96.7 °F) (Temporal)   Resp 16   SpO2 100%     Physical Exam  Vitals reviewed.   HENT:      Ears:       Comments: Hard of hearing     Mouth/Throat:      Comments: Missing teeth  Cardiovascular:      Rate and Rhythm: Normal rate.      Pulses: Normal pulses.      Comments: Left DP and PT pulses easily palpated, +2.  Foot warm  Pulmonary:      Effort: Pulmonary effort is normal.   Abdominal:      Palpations: Abdomen is soft.   Musculoskeletal:         General: Swelling present.      Comments: Localized swelling to left hallux improving   Skin:     General: Skin is warm.      Comments: Left plantar great toe ulcer: Presents today with stained callus.  Underlying skin intact.  Wound resolved   Neurological:      Mental Status: He is alert and oriented to person, place, and time.      Motor: Weakness present.      Comments: Feet are insensate.  Neuropathy extends from toes to knee bilaterally      Generalized weakness   Psychiatric:         Mood and Affect: Affect normal.         Cognition and Memory: Memory normal.      Comments: Forgetful       Monofilament testing with a 10 gram force: sensation intact: decreased bilaterally  Visual Inspection: Feet with maceration, ulcers, fissures.  Pedal pulses: intact bilaterally     WOUND ASSESSMENT               PROCEDURE: Excisional debridement of left plantar hallux callus  -2% viscous lidocaine applied topically to wound bed for approximately 5 minutes prior to debridement  -Scalpel used to debride callus to skin level.  Underlying skin intact  - Wound care completed by wound RN, refer to flowsheet  - Patient tolerated the procedure well, without c/o pain or discomfort.       Pertinent Labs and Diagnostics:         Labs: See media tab    A1c:   Lab Results   Component Value Date/Time    HBA1C 9.3 (A) 09/30/2024 12:00 AM          IMAGING:   10/11/2024-x-ray of toes, left  FINDINGS:  There is soft tissue irregularity involving the plantar aspect of the great toe with high density material in this region. There is no definite fracture or dislocation. Mild changes of osteoarthrosis  are noted.     IMPRESSION:     Plantar ulcer involving the great toe without definite radiographic evidence for osteomyelitis      VASCULAR STUDIES: No results found.    LAST  WOUND CULTURE:   Lab Results   Component Value Date/Time    CULTRSULT - (A) 10/11/2024 09:30 AM    CULTRSULT Staphylococcus aureus  Light growth   (A) 10/11/2024 09:30 AM           ASSESSMENT AND PLAN:     1. Diabetic ulcer of toe of left foot associated with type 2 diabetes mellitus, with fat layer exposed (HCC)    12/3/2024: Presents today with stained callus, underlying skin intact.  Wound resolved  -Discharge from [facility]  -Patient to return to clinic ASAP if wound recurs, or if he develops new wounds   -Rx for orthotic shoes and inserts provided  -Reviewed importance of diligent footcare.  Patient advised to check his feet at least daily, always wear appropriate footwear, never barefoot, seek medical care immediately for any foot wounds.    Wound care: Open to air      2. Poorly controlled type 2 diabetes mellitus with peripheral neuropathy (HCC)    12/3/2024: Patient has not checked his blood sugar for several days, ran out of test strips  .  - Patient has already been seen by pharmacotherapy service.  States they were helpful  - Patient to continue offloading in postop shoe and pedifix  - Referral to endocrinology placed last visit.  Has been authorized, patient has been referred to [facility].  Contact information provided.  Patient advised to make an appointment ASAP.    3. Wound infection    12/3/2024: His wound is healed, no evidence of infection      PATIENT EDUCATION  - Importance of tight glucose control for wound healing   - Implications of loss of protective sensation (LOPS) discussed with patient- including increased risk for amputation.  - Advised to check feet at least daily, moisturize feet, and to always wear protective foot wear.   -  Importance of offloading foot to assist with wound healing  - Advised pt not to trim nails or  calluses, seek foot/nail care from podiatrist or certified foot/nail nurse  - Importance of adequate nutrition for wound healing    My total time spent caring for the patient on the day of the encounter was 20 minutes.   This does not include time spent on separately billable procedures/tests.     Please note that this note may have been created using voice recognition software. I have worked with technical experts from [facility] to optimize the interface.  I have made every reasonable attempt to correct obvious errors, but there may be errors of grammar and possibly content that I did not discover before finalizing the note.    N

## 2024-12-03 NOTE — CERTIFICATION
Advanced [facility]   [facility]   [address]   [address]   [place] [address]   [phone] Fax: [phone]    Discharge Note      Referring Physician: Narciso Rose M.D.  Wound Etiology: Diabetic foot ulcer   Wound location: Left great toe plantar   ICD-10: E11.621,L97.522 (ICD-10-CM) - Diabetic ulcer of toe of left foot associated with type 2 diabetes mellitus, with fat layer exposed (HCC)   Date of Discharge: 12/03/24    Assessment:  Discharge patient at this time secondary to wound resolution.   Thank you for the referral and the opportunity to treat your patient.

## 2024-12-05 ENCOUNTER — APPOINTMENT (OUTPATIENT)
Dept: WOUND CARE | Facility: MEDICAL CENTER | Age: 74
End: 2024-12-05
Payer: MEDICARE

## 2024-12-06 DIAGNOSIS — E11.69 TYPE 2 DIABETES MELLITUS WITH OTHER SPECIFIED COMPLICATION, WITH LONG-TERM CURRENT USE OF INSULIN (HCC): ICD-10-CM

## 2024-12-06 DIAGNOSIS — Z79.4 TYPE 2 DIABETES MELLITUS WITH OTHER SPECIFIED COMPLICATION, WITH LONG-TERM CURRENT USE OF INSULIN (HCC): ICD-10-CM

## 2024-12-06 RX ORDER — BLOOD SUGAR DIAGNOSTIC
STRIP MISCELLANEOUS
Qty: 100 STRIP | Refills: 5 | OUTPATIENT
Start: 2024-12-06 | End: 2025-01-05

## 2024-12-06 RX ORDER — BLOOD SUGAR DIAGNOSTIC
1 STRIP MISCELLANEOUS EVERY MORNING
Qty: 100 STRIP | Refills: 5 | Status: SHIPPED | OUTPATIENT
Start: 2024-12-06

## 2024-12-06 NOTE — TELEPHONE ENCOUNTER
[facility] regarding this patient's prescription. They need a new script with ICD 10 code for them to be able to bill it to his medicare insurance. Please advise. Thank you

## 2024-12-10 ENCOUNTER — APPOINTMENT (OUTPATIENT)
Dept: VASCULAR LAB | Facility: MEDICAL CENTER | Age: 74
End: 2024-12-10
Attending: INTERNAL MEDICINE
Payer: MEDICARE

## 2024-12-13 ENCOUNTER — HOSPITAL ENCOUNTER (OUTPATIENT)
Dept: RADIOLOGY | Facility: MEDICAL CENTER | Age: 74
End: 2024-12-13
Attending: UROLOGY
Payer: MEDICARE

## 2024-12-13 DIAGNOSIS — R97.20 ELEVATED PROSTATE SPECIFIC ANTIGEN (PSA): ICD-10-CM

## 2024-12-13 PROCEDURE — 700111 HCHG RX REV CODE 636 W/ 250 OVERRIDE (IP): Mod: JZ,JG | Performed by: RADIOLOGY

## 2024-12-13 PROCEDURE — 700117 HCHG RX CONTRAST REV CODE 255: Mod: JZ | Performed by: UROLOGY

## 2024-12-13 PROCEDURE — A9579 GAD-BASE MR CONTRAST NOS,1ML: HCPCS | Mod: JZ | Performed by: UROLOGY

## 2024-12-13 PROCEDURE — 72197 MRI PELVIS W/O & W/DYE: CPT

## 2024-12-13 RX ADMIN — GADOTERIDOL 14 ML: 279.3 INJECTION, SOLUTION INTRAVENOUS at 13:31

## 2024-12-13 RX ADMIN — GLUCAGON 1 MG: 1 INJECTION, POWDER, LYOPHILIZED, FOR SOLUTION INTRAMUSCULAR; INTRAVENOUS at 13:00

## 2024-12-18 ENCOUNTER — TELEPHONE (OUTPATIENT)
Dept: WOUND CARE | Facility: MEDICAL CENTER | Age: 74
End: 2024-12-18
Payer: MEDICARE

## 2024-12-19 ENCOUNTER — APPOINTMENT (OUTPATIENT)
Dept: WOUND CARE | Facility: MEDICAL CENTER | Age: 74
End: 2024-12-19
Payer: MEDICARE

## 2024-12-27 NOTE — PROGRESS NOTES
Prescription for diabetic shoes    Patient: Rogelio Padilla  YOB: 1950    Diagnosis diabetes mellitus    Patient would benefit from diabetic foot wear due to qualifying foot conditions of ongoing neuropathy, poor circulation and prior ulceration secondary to diabetes.    Patient would likely benefit from therapeutic shoes.    Prescribe 1 pair of diabetic shoes    Prescriber: Narciso Rose MD  Date: 12/27/2024

## 2025-01-02 ENCOUNTER — APPOINTMENT (OUTPATIENT)
Dept: WOUND CARE | Facility: MEDICAL CENTER | Age: 75
End: 2025-01-02
Payer: MEDICARE

## 2025-01-08 NOTE — TELEPHONE ENCOUNTER
Received request via: Pharmacy    Was the patient seen in the last year in this department? Yes    Does the patient have an active prescription (recently filled or refills available) for medication(s) requested? No    [facility]   [facility] [id number] - [place] - [address]       Does the patient have intermediate Plus and need 100-day supply? (This applies to ALL medications) Patient does not have SCP

## 2025-01-10 ENCOUNTER — HOSPITAL ENCOUNTER (OUTPATIENT)
Dept: LAB | Facility: MEDICAL CENTER | Age: 75
End: 2025-01-10
Attending: UROLOGY
Payer: MEDICARE

## 2025-01-10 LAB
BUN SERPL-MCNC: 29 MG/DL (ref 8–22)
CREAT SERPL-MCNC: 0.82 MG/DL (ref 0.5–1.4)
GFR SERPLBLD CREATININE-BSD FMLA CKD-EPI: 93 ML/MIN/1.73 M 2
PSA SERPL DL<=0.01 NG/ML-MCNC: 13.3 NG/ML (ref 0–4)

## 2025-01-10 PROCEDURE — 82565 ASSAY OF CREATININE: CPT

## 2025-01-10 PROCEDURE — 84153 ASSAY OF PSA TOTAL: CPT

## 2025-01-10 PROCEDURE — 84520 ASSAY OF UREA NITROGEN: CPT

## 2025-01-10 PROCEDURE — 36415 COLL VENOUS BLD VENIPUNCTURE: CPT

## 2025-01-13 ENCOUNTER — PATIENT MESSAGE (OUTPATIENT)
Dept: CARDIOLOGY | Facility: MEDICAL CENTER | Age: 75
End: 2025-01-13
Payer: MEDICARE

## 2025-01-13 RX ORDER — BLOOD SUGAR DIAGNOSTIC
1 STRIP MISCELLANEOUS EVERY MORNING
Qty: 100 STRIP | Refills: 5 | Status: SHIPPED | OUTPATIENT
Start: 2025-01-13 | End: 2025-01-15 | Stop reason: SDUPTHER

## 2025-01-14 ENCOUNTER — HOSPITAL ENCOUNTER (OUTPATIENT)
Dept: LAB | Facility: MEDICAL CENTER | Age: 75
DRG: 177 | End: 2025-01-14
Attending: INTERNAL MEDICINE
Payer: MEDICARE

## 2025-01-14 DIAGNOSIS — E78.5 DYSLIPIDEMIA: ICD-10-CM

## 2025-01-14 LAB
ALBUMIN SERPL BCP-MCNC: 3.9 G/DL (ref 3.2–4.9)
ALBUMIN/GLOB SERPL: 1.3 G/DL
ALP SERPL-CCNC: 110 U/L (ref 30–99)
ALT SERPL-CCNC: 29 U/L (ref 2–50)
ANION GAP SERPL CALC-SCNC: 9 MMOL/L (ref 7–16)
AST SERPL-CCNC: 10 U/L (ref 12–45)
BILIRUB SERPL-MCNC: <0.2 MG/DL (ref 0.1–1.5)
BUN SERPL-MCNC: 24 MG/DL (ref 8–22)
CALCIUM ALBUM COR SERPL-MCNC: 9.6 MG/DL (ref 8.5–10.5)
CALCIUM SERPL-MCNC: 9.5 MG/DL (ref 8.5–10.5)
CHLORIDE SERPL-SCNC: 101 MMOL/L (ref 96–112)
CHOLEST SERPL-MCNC: 107 MG/DL (ref 100–199)
CO2 SERPL-SCNC: 28 MMOL/L (ref 20–33)
CREAT SERPL-MCNC: 0.68 MG/DL (ref 0.5–1.4)
GFR SERPLBLD CREATININE-BSD FMLA CKD-EPI: 98 ML/MIN/1.73 M 2
GLOBULIN SER CALC-MCNC: 3.1 G/DL (ref 1.9–3.5)
GLUCOSE SERPL-MCNC: 200 MG/DL (ref 65–99)
HDLC SERPL-MCNC: 56 MG/DL
LDLC SERPL CALC-MCNC: 44 MG/DL
POTASSIUM SERPL-SCNC: 4.8 MMOL/L (ref 3.6–5.5)
PROT SERPL-MCNC: 7 G/DL (ref 6–8.2)
SODIUM SERPL-SCNC: 138 MMOL/L (ref 135–145)
TRIGL SERPL-MCNC: 34 MG/DL (ref 0–149)

## 2025-01-14 PROCEDURE — 36415 COLL VENOUS BLD VENIPUNCTURE: CPT

## 2025-01-14 PROCEDURE — 80053 COMPREHEN METABOLIC PANEL: CPT

## 2025-01-14 PROCEDURE — 80061 LIPID PANEL: CPT

## 2025-01-15 ENCOUNTER — HOSPITAL ENCOUNTER (INPATIENT)
Facility: MEDICAL CENTER | Age: 75
LOS: 7 days | DRG: 177 | End: 2025-01-22
Attending: EMERGENCY MEDICINE | Admitting: FAMILY MEDICINE
Payer: MEDICARE

## 2025-01-15 ENCOUNTER — APPOINTMENT (OUTPATIENT)
Dept: RADIOLOGY | Facility: MEDICAL CENTER | Age: 75
DRG: 177 | End: 2025-01-15
Attending: EMERGENCY MEDICINE
Payer: MEDICARE

## 2025-01-15 DIAGNOSIS — Z79.4 TYPE 2 DIABETES MELLITUS WITH OTHER SPECIFIED COMPLICATION, WITH LONG-TERM CURRENT USE OF INSULIN (HCC): ICD-10-CM

## 2025-01-15 DIAGNOSIS — R13.10 DYSPHAGIA, UNSPECIFIED TYPE: ICD-10-CM

## 2025-01-15 DIAGNOSIS — Z91.89 AT HIGH RISK FOR ASPIRATION: ICD-10-CM

## 2025-01-15 DIAGNOSIS — E11.69 TYPE 2 DIABETES MELLITUS WITH OTHER SPECIFIED COMPLICATION, WITH LONG-TERM CURRENT USE OF INSULIN (HCC): ICD-10-CM

## 2025-01-15 DIAGNOSIS — T17.908A ASPIRATION INTO AIRWAY, INITIAL ENCOUNTER: ICD-10-CM

## 2025-01-15 DIAGNOSIS — D72.829 LEUKOCYTOSIS, UNSPECIFIED TYPE: ICD-10-CM

## 2025-01-15 DIAGNOSIS — R09.89 CHOKING EPISODE: ICD-10-CM

## 2025-01-15 DIAGNOSIS — R09.02 HYPOXIA: ICD-10-CM

## 2025-01-15 DIAGNOSIS — J18.9 PNEUMONIA OF BOTH LOWER LOBES DUE TO INFECTIOUS ORGANISM: ICD-10-CM

## 2025-01-15 DIAGNOSIS — J96.01 ACUTE RESPIRATORY FAILURE WITH HYPOXIA (HCC): ICD-10-CM

## 2025-01-15 PROBLEM — J90 PLEURAL EFFUSION: Status: ACTIVE | Noted: 2025-01-15

## 2025-01-15 PROBLEM — D64.9 ANEMIA: Status: ACTIVE | Noted: 2025-01-15

## 2025-01-15 PROBLEM — K82.8 THICKENING OF WALL OF GALLBLADDER WITH PERICHOLECYSTIC FLUID: Status: ACTIVE | Noted: 2025-01-15

## 2025-01-15 PROBLEM — E11.9 TYPE 2 DIABETES MELLITUS, WITH LONG-TERM CURRENT USE OF INSULIN (HCC): Status: ACTIVE | Noted: 2025-01-15

## 2025-01-15 LAB
ALBUMIN SERPL BCP-MCNC: 3.7 G/DL (ref 3.2–4.9)
ALBUMIN/GLOB SERPL: 1.2 G/DL
ALP SERPL-CCNC: 147 U/L (ref 30–99)
ALT SERPL-CCNC: 116 U/L (ref 2–50)
ANION GAP SERPL CALC-SCNC: 16 MMOL/L (ref 7–16)
APTT PPP: 34.4 SEC (ref 24.7–36)
AST SERPL-CCNC: 69 U/L (ref 12–45)
BASOPHILS # BLD AUTO: 0.2 % (ref 0–1.8)
BASOPHILS # BLD: 0.04 K/UL (ref 0–0.12)
BILIRUB SERPL-MCNC: 0.2 MG/DL (ref 0.1–1.5)
BUN SERPL-MCNC: 52 MG/DL (ref 8–22)
CALCIUM ALBUM COR SERPL-MCNC: 9.7 MG/DL (ref 8.5–10.5)
CALCIUM SERPL-MCNC: 9.5 MG/DL (ref 8.5–10.5)
CHLORIDE SERPL-SCNC: 93 MMOL/L (ref 96–112)
CO2 SERPL-SCNC: 22 MMOL/L (ref 20–33)
CREAT SERPL-MCNC: 1.09 MG/DL (ref 0.5–1.4)
EKG IMPRESSION: NORMAL
EOSINOPHIL # BLD AUTO: 0.03 K/UL (ref 0–0.51)
EOSINOPHIL NFR BLD: 0.2 % (ref 0–6.9)
ERYTHROCYTE [DISTWIDTH] IN BLOOD BY AUTOMATED COUNT: 58.4 FL (ref 35.9–50)
GFR SERPLBLD CREATININE-BSD FMLA CKD-EPI: 72 ML/MIN/1.73 M 2
GLOBULIN SER CALC-MCNC: 3 G/DL (ref 1.9–3.5)
GLUCOSE SERPL-MCNC: 438 MG/DL (ref 65–99)
HCT VFR BLD AUTO: 30.2 % (ref 42–52)
HGB BLD-MCNC: 9.2 G/DL (ref 14–18)
IMM GRANULOCYTES # BLD AUTO: 0.11 K/UL (ref 0–0.11)
IMM GRANULOCYTES NFR BLD AUTO: 0.6 % (ref 0–0.9)
INR PPP: 1.31 (ref 0.87–1.13)
LYMPHOCYTES # BLD AUTO: 1.24 K/UL (ref 1–4.8)
LYMPHOCYTES NFR BLD: 7.1 % (ref 22–41)
MAGNESIUM SERPL-MCNC: 2.3 MG/DL (ref 1.5–2.5)
MCH RBC QN AUTO: 27.7 PG (ref 27–33)
MCHC RBC AUTO-ENTMCNC: 30.5 G/DL (ref 32.3–36.5)
MCV RBC AUTO: 91 FL (ref 81.4–97.8)
MONOCYTES # BLD AUTO: 0.85 K/UL (ref 0–0.85)
MONOCYTES NFR BLD AUTO: 4.9 % (ref 0–13.4)
NEUTROPHILS # BLD AUTO: 15.13 K/UL (ref 1.82–7.42)
NEUTROPHILS NFR BLD: 87 % (ref 44–72)
NRBC # BLD AUTO: 0 K/UL
NRBC BLD-RTO: 0 /100 WBC (ref 0–0.2)
NT-PROBNP SERPL IA-MCNC: 1680 PG/ML (ref 0–125)
PLATELET # BLD AUTO: 218 K/UL (ref 164–446)
PMV BLD AUTO: 10.8 FL (ref 9–12.9)
POTASSIUM SERPL-SCNC: 5.2 MMOL/L (ref 3.6–5.5)
PROCALCITONIN SERPL-MCNC: 10 NG/ML
PROT SERPL-MCNC: 6.7 G/DL (ref 6–8.2)
PROTHROMBIN TIME: 16.3 SEC (ref 12–14.6)
RBC # BLD AUTO: 3.32 M/UL (ref 4.7–6.1)
SODIUM SERPL-SCNC: 131 MMOL/L (ref 135–145)
TROPONIN T SERPL-MCNC: 23 NG/L (ref 6–19)
WBC # BLD AUTO: 17.4 K/UL (ref 4.8–10.8)

## 2025-01-15 PROCEDURE — 700101 HCHG RX REV CODE 250: Performed by: EMERGENCY MEDICINE

## 2025-01-15 PROCEDURE — 770006 HCHG ROOM/CARE - MED/SURG/GYN SEMI*

## 2025-01-15 PROCEDURE — 96375 TX/PRO/DX INJ NEW DRUG ADDON: CPT

## 2025-01-15 PROCEDURE — 85610 PROTHROMBIN TIME: CPT

## 2025-01-15 PROCEDURE — 0241U HCHG SARS-COV-2 COVID-19 NFCT DS RESP RNA 4 TRGT MIC: CPT

## 2025-01-15 PROCEDURE — 700117 HCHG RX CONTRAST REV CODE 255: Performed by: EMERGENCY MEDICINE

## 2025-01-15 PROCEDURE — 83880 ASSAY OF NATRIURETIC PEPTIDE: CPT

## 2025-01-15 PROCEDURE — 83735 ASSAY OF MAGNESIUM: CPT

## 2025-01-15 PROCEDURE — 93005 ELECTROCARDIOGRAM TRACING: CPT | Mod: TC | Performed by: EMERGENCY MEDICINE

## 2025-01-15 PROCEDURE — 99285 EMERGENCY DEPT VISIT HI MDM: CPT

## 2025-01-15 PROCEDURE — 85025 COMPLETE CBC W/AUTO DIFF WBC: CPT

## 2025-01-15 PROCEDURE — 700105 HCHG RX REV CODE 258: Performed by: EMERGENCY MEDICINE

## 2025-01-15 PROCEDURE — 71045 X-RAY EXAM CHEST 1 VIEW: CPT

## 2025-01-15 PROCEDURE — 80053 COMPREHEN METABOLIC PANEL: CPT

## 2025-01-15 PROCEDURE — 76705 ECHO EXAM OF ABDOMEN: CPT

## 2025-01-15 PROCEDURE — 84484 ASSAY OF TROPONIN QUANT: CPT

## 2025-01-15 PROCEDURE — 93005 ELECTROCARDIOGRAM TRACING: CPT | Mod: TC

## 2025-01-15 PROCEDURE — 85730 THROMBOPLASTIN TIME PARTIAL: CPT

## 2025-01-15 PROCEDURE — 36415 COLL VENOUS BLD VENIPUNCTURE: CPT

## 2025-01-15 PROCEDURE — 96365 THER/PROPH/DIAG IV INF INIT: CPT

## 2025-01-15 PROCEDURE — 700111 HCHG RX REV CODE 636 W/ 250 OVERRIDE (IP): Mod: JZ | Performed by: EMERGENCY MEDICINE

## 2025-01-15 PROCEDURE — 84145 PROCALCITONIN (PCT): CPT

## 2025-01-15 PROCEDURE — 71260 CT THORAX DX C+: CPT

## 2025-01-15 RX ORDER — FUROSEMIDE 10 MG/ML
20 INJECTION INTRAMUSCULAR; INTRAVENOUS ONCE
Status: COMPLETED | OUTPATIENT
Start: 2025-01-15 | End: 2025-01-15

## 2025-01-15 RX ORDER — DEXTROSE MONOHYDRATE 25 G/50ML
25 INJECTION, SOLUTION INTRAVENOUS
Status: DISCONTINUED | OUTPATIENT
Start: 2025-01-15 | End: 2025-01-21

## 2025-01-15 RX ORDER — AMOXICILLIN 250 MG
2 CAPSULE ORAL
Status: DISCONTINUED | OUTPATIENT
Start: 2025-01-15 | End: 2025-01-22 | Stop reason: HOSPADM

## 2025-01-15 RX ORDER — INSULIN LISPRO 100 [IU]/ML
1-6 INJECTION, SOLUTION INTRAVENOUS; SUBCUTANEOUS
Status: DISCONTINUED | OUTPATIENT
Start: 2025-01-16 | End: 2025-01-21

## 2025-01-15 RX ORDER — POLYETHYLENE GLYCOL 3350 17 G/17G
1 POWDER, FOR SOLUTION ORAL
Status: DISCONTINUED | OUTPATIENT
Start: 2025-01-15 | End: 2025-01-22 | Stop reason: HOSPADM

## 2025-01-15 RX ORDER — DIPHENHYDRAMINE HYDROCHLORIDE 50 MG/ML
25 INJECTION INTRAMUSCULAR; INTRAVENOUS EVERY 6 HOURS PRN
Status: DISCONTINUED | OUTPATIENT
Start: 2025-01-15 | End: 2025-01-22 | Stop reason: HOSPADM

## 2025-01-15 RX ORDER — BLOOD SUGAR DIAGNOSTIC
1 STRIP MISCELLANEOUS EVERY MORNING
Qty: 100 STRIP | Refills: 5 | Status: SHIPPED | OUTPATIENT
Start: 2025-01-15

## 2025-01-15 RX ORDER — FUROSEMIDE 10 MG/ML
20 INJECTION INTRAMUSCULAR; INTRAVENOUS ONCE
Status: COMPLETED | OUTPATIENT
Start: 2025-01-16 | End: 2025-01-16

## 2025-01-15 RX ORDER — INSULIN LISPRO 100 [IU]/ML
0.2 INJECTION, SOLUTION INTRAVENOUS; SUBCUTANEOUS
Status: DISCONTINUED | OUTPATIENT
Start: 2025-01-16 | End: 2025-01-15

## 2025-01-15 RX ADMIN — IOHEXOL 100 ML: 350 INJECTION, SOLUTION INTRAVENOUS at 20:40

## 2025-01-15 RX ADMIN — FUROSEMIDE 20 MG: 10 INJECTION, SOLUTION INTRAVENOUS at 22:14

## 2025-01-15 RX ADMIN — DOXYCYCLINE 100 MG: 100 INJECTION, POWDER, LYOPHILIZED, FOR SOLUTION INTRAVENOUS at 22:17

## 2025-01-15 ASSESSMENT — FIBROSIS 4 INDEX: FIB4 SCORE: 0.41

## 2025-01-16 ENCOUNTER — APPOINTMENT (OUTPATIENT)
Dept: RADIOLOGY | Facility: MEDICAL CENTER | Age: 75
DRG: 177 | End: 2025-01-16
Attending: STUDENT IN AN ORGANIZED HEALTH CARE EDUCATION/TRAINING PROGRAM
Payer: MEDICARE

## 2025-01-16 ENCOUNTER — APPOINTMENT (OUTPATIENT)
Dept: WOUND CARE | Facility: MEDICAL CENTER | Age: 75
End: 2025-01-16
Payer: MEDICARE

## 2025-01-16 PROBLEM — F32.A DEPRESSION: Status: ACTIVE | Noted: 2025-01-16

## 2025-01-16 LAB
ALBUMIN SERPL BCP-MCNC: 3.8 G/DL (ref 3.2–4.9)
ALBUMIN/GLOB SERPL: 1.3 G/DL
ALP SERPL-CCNC: 141 U/L (ref 30–99)
ALT SERPL-CCNC: 109 U/L (ref 2–50)
ANION GAP SERPL CALC-SCNC: 12 MMOL/L (ref 7–16)
AST SERPL-CCNC: 50 U/L (ref 12–45)
BASOPHILS # BLD AUTO: 0.2 % (ref 0–1.8)
BASOPHILS # BLD: 0.03 K/UL (ref 0–0.12)
BILIRUB SERPL-MCNC: 0.2 MG/DL (ref 0.1–1.5)
BUN SERPL-MCNC: 46 MG/DL (ref 8–22)
CALCIUM ALBUM COR SERPL-MCNC: 9.8 MG/DL (ref 8.5–10.5)
CALCIUM SERPL-MCNC: 9.6 MG/DL (ref 8.5–10.5)
CHLORIDE SERPL-SCNC: 97 MMOL/L (ref 96–112)
CO2 SERPL-SCNC: 24 MMOL/L (ref 20–33)
CREAT SERPL-MCNC: 0.82 MG/DL (ref 0.5–1.4)
EOSINOPHIL # BLD AUTO: 0.04 K/UL (ref 0–0.51)
EOSINOPHIL NFR BLD: 0.3 % (ref 0–6.9)
ERYTHROCYTE [DISTWIDTH] IN BLOOD BY AUTOMATED COUNT: 55.7 FL (ref 35.9–50)
EST. AVERAGE GLUCOSE BLD GHB EST-MCNC: 272 MG/DL
FLUAV RNA SPEC QL NAA+PROBE: NEGATIVE
FLUBV RNA SPEC QL NAA+PROBE: NEGATIVE
GFR SERPLBLD CREATININE-BSD FMLA CKD-EPI: 93 ML/MIN/1.73 M 2
GGT SERPL-CCNC: 57 U/L (ref 7–51)
GLOBULIN SER CALC-MCNC: 3 G/DL (ref 1.9–3.5)
GLUCOSE BLD STRIP.AUTO-MCNC: 215 MG/DL (ref 65–99)
GLUCOSE BLD STRIP.AUTO-MCNC: 232 MG/DL (ref 65–99)
GLUCOSE BLD STRIP.AUTO-MCNC: 270 MG/DL (ref 65–99)
GLUCOSE BLD STRIP.AUTO-MCNC: 284 MG/DL (ref 65–99)
GLUCOSE SERPL-MCNC: 349 MG/DL (ref 65–99)
HBA1C MFR BLD: 11.1 % (ref 4–5.6)
HCT VFR BLD AUTO: 28.3 % (ref 42–52)
HGB BLD-MCNC: 9 G/DL (ref 14–18)
IMM GRANULOCYTES # BLD AUTO: 0.08 K/UL (ref 0–0.11)
IMM GRANULOCYTES NFR BLD AUTO: 0.6 % (ref 0–0.9)
LYMPHOCYTES # BLD AUTO: 0.92 K/UL (ref 1–4.8)
LYMPHOCYTES NFR BLD: 6.4 % (ref 22–41)
MCH RBC QN AUTO: 28.4 PG (ref 27–33)
MCHC RBC AUTO-ENTMCNC: 31.8 G/DL (ref 32.3–36.5)
MCV RBC AUTO: 89.3 FL (ref 81.4–97.8)
MONOCYTES # BLD AUTO: 0.56 K/UL (ref 0–0.85)
MONOCYTES NFR BLD AUTO: 3.9 % (ref 0–13.4)
NEUTROPHILS # BLD AUTO: 12.72 K/UL (ref 1.82–7.42)
NEUTROPHILS NFR BLD: 88.6 % (ref 44–72)
NRBC # BLD AUTO: 0 K/UL
NRBC BLD-RTO: 0 /100 WBC (ref 0–0.2)
PLATELET # BLD AUTO: 221 K/UL (ref 164–446)
PMV BLD AUTO: 11.5 FL (ref 9–12.9)
POTASSIUM SERPL-SCNC: 4.8 MMOL/L (ref 3.6–5.5)
PROT SERPL-MCNC: 6.8 G/DL (ref 6–8.2)
RBC # BLD AUTO: 3.17 M/UL (ref 4.7–6.1)
RSV RNA SPEC QL NAA+PROBE: NEGATIVE
SARS-COV-2 RNA RESP QL NAA+PROBE: NOTDETECTED
SODIUM SERPL-SCNC: 133 MMOL/L (ref 135–145)
SPECIMEN SOURCE: NORMAL
WBC # BLD AUTO: 14.4 K/UL (ref 4.8–10.8)

## 2025-01-16 PROCEDURE — 99232 SBSQ HOSP IP/OBS MODERATE 35: CPT | Performed by: STUDENT IN AN ORGANIZED HEALTH CARE EDUCATION/TRAINING PROGRAM

## 2025-01-16 PROCEDURE — A9270 NON-COVERED ITEM OR SERVICE: HCPCS

## 2025-01-16 PROCEDURE — 85025 COMPLETE CBC W/AUTO DIFF WBC: CPT

## 2025-01-16 PROCEDURE — 82962 GLUCOSE BLOOD TEST: CPT | Mod: 91

## 2025-01-16 PROCEDURE — 700101 HCHG RX REV CODE 250

## 2025-01-16 PROCEDURE — 74230 X-RAY XM SWLNG FUNCJ C+: CPT

## 2025-01-16 PROCEDURE — 770006 HCHG ROOM/CARE - MED/SURG/GYN SEMI*

## 2025-01-16 PROCEDURE — 700105 HCHG RX REV CODE 258

## 2025-01-16 PROCEDURE — 92610 EVALUATE SWALLOWING FUNCTION: CPT

## 2025-01-16 PROCEDURE — 700102 HCHG RX REV CODE 250 W/ 637 OVERRIDE(OP)

## 2025-01-16 PROCEDURE — 92611 MOTION FLUOROSCOPY/SWALLOW: CPT

## 2025-01-16 PROCEDURE — 80053 COMPREHEN METABOLIC PANEL: CPT

## 2025-01-16 PROCEDURE — 83036 HEMOGLOBIN GLYCOSYLATED A1C: CPT

## 2025-01-16 PROCEDURE — 82977 ASSAY OF GGT: CPT

## 2025-01-16 PROCEDURE — 700111 HCHG RX REV CODE 636 W/ 250 OVERRIDE (IP)

## 2025-01-16 RX ORDER — SPIRONOLACTONE 25 MG/1
25 TABLET ORAL DAILY
Status: DISCONTINUED | OUTPATIENT
Start: 2025-01-16 | End: 2025-01-22 | Stop reason: HOSPADM

## 2025-01-16 RX ORDER — CARVEDILOL 3.12 MG/1
3.12 TABLET ORAL 2 TIMES DAILY
Status: DISCONTINUED | OUTPATIENT
Start: 2025-01-16 | End: 2025-01-22 | Stop reason: HOSPADM

## 2025-01-16 RX ORDER — ATORVASTATIN CALCIUM 40 MG/1
40 TABLET, FILM COATED ORAL DAILY
Status: DISCONTINUED | OUTPATIENT
Start: 2025-01-16 | End: 2025-01-22 | Stop reason: HOSPADM

## 2025-01-16 RX ORDER — LOSARTAN POTASSIUM 25 MG/1
25 TABLET ORAL DAILY
Status: DISCONTINUED | OUTPATIENT
Start: 2025-01-16 | End: 2025-01-22 | Stop reason: HOSPADM

## 2025-01-16 RX ADMIN — SERTRALINE HYDROCHLORIDE 25 MG: 50 TABLET ORAL at 10:12

## 2025-01-16 RX ADMIN — ATORVASTATIN CALCIUM 40 MG: 40 TABLET, FILM COATED ORAL at 10:12

## 2025-01-16 RX ADMIN — FUROSEMIDE 20 MG: 10 INJECTION INTRAMUSCULAR; INTRAVENOUS at 09:12

## 2025-01-16 RX ADMIN — CEFTRIAXONE SODIUM 1000 MG: 10 INJECTION, POWDER, FOR SOLUTION INTRAVENOUS at 06:29

## 2025-01-16 RX ADMIN — APIXABAN 5 MG: 5 TABLET, FILM COATED ORAL at 17:59

## 2025-01-16 RX ADMIN — DOXYCYCLINE 100 MG: 100 INJECTION, POWDER, LYOPHILIZED, FOR SOLUTION INTRAVENOUS at 06:34

## 2025-01-16 RX ADMIN — INSULIN LISPRO 3 UNITS: 100 INJECTION, SOLUTION INTRAVENOUS; SUBCUTANEOUS at 11:52

## 2025-01-16 RX ADMIN — DOXYCYCLINE 100 MG: 100 INJECTION, POWDER, LYOPHILIZED, FOR SOLUTION INTRAVENOUS at 18:04

## 2025-01-16 RX ADMIN — INSULIN LISPRO 2 UNITS: 100 INJECTION, SOLUTION INTRAVENOUS; SUBCUTANEOUS at 23:42

## 2025-01-16 RX ADMIN — INSULIN LISPRO 2 UNITS: 100 INJECTION, SOLUTION INTRAVENOUS; SUBCUTANEOUS at 17:57

## 2025-01-16 RX ADMIN — INSULIN LISPRO 3 UNITS: 100 INJECTION, SOLUTION INTRAVENOUS; SUBCUTANEOUS at 09:06

## 2025-01-16 RX ADMIN — APIXABAN 5 MG: 5 TABLET, FILM COATED ORAL at 10:12

## 2025-01-16 ASSESSMENT — CHA2DS2 SCORE
CHF OR LEFT VENTRICULAR DYSFUNCTION: YES
AGE 65 TO 74: YES
SEX: MALE
PRIOR STROKE OR TIA OR THROMBOEMBOLISM: NO
DIABETES: YES
AGE 75 OR GREATER: NO
VASCULAR DISEASE: NO
HYPERTENSION: YES
CHA2DS2 VASC SCORE: 4

## 2025-01-16 ASSESSMENT — LIFESTYLE VARIABLES
TOTAL SCORE: 0
EVER HAD A DRINK FIRST THING IN THE MORNING TO STEADY YOUR NERVES TO GET RID OF A HANGOVER: NO
HAVE YOU EVER FELT YOU SHOULD CUT DOWN ON YOUR DRINKING: NO
HAVE PEOPLE ANNOYED YOU BY CRITICIZING YOUR DRINKING: NO
TOTAL SCORE: 0
ALCOHOL_USE: NO
ALCOHOL_USE: NO
HAVE PEOPLE ANNOYED YOU BY CRITICIZING YOUR DRINKING: NO
TOTAL SCORE: 0
EVER FELT BAD OR GUILTY ABOUT YOUR DRINKING: NO
CONSUMPTION TOTAL: INCOMPLETE
EVER HAD A DRINK FIRST THING IN THE MORNING TO STEADY YOUR NERVES TO GET RID OF A HANGOVER: NO
HAVE YOU EVER FELT YOU SHOULD CUT DOWN ON YOUR DRINKING: NO

## 2025-01-16 ASSESSMENT — PATIENT HEALTH QUESTIONNAIRE - PHQ9
2. FEELING DOWN, DEPRESSED, IRRITABLE, OR HOPELESS: NOT AT ALL
SUM OF ALL RESPONSES TO PHQ9 QUESTIONS 1 AND 2: 0
2. FEELING DOWN, DEPRESSED, IRRITABLE, OR HOPELESS: NOT AT ALL
1. LITTLE INTEREST OR PLEASURE IN DOING THINGS: NOT AT ALL
1. LITTLE INTEREST OR PLEASURE IN DOING THINGS: NOT AT ALL
SUM OF ALL RESPONSES TO PHQ9 QUESTIONS 1 AND 2: 0

## 2025-01-16 ASSESSMENT — PAIN DESCRIPTION - PAIN TYPE: TYPE: ACUTE PAIN

## 2025-01-16 NOTE — ED TRIAGE NOTES
Chief Complaint   Patient presents with    Choking     Pt was found down unresponsive by daughter. On EMS arrival pt was unresponsive and apneic when they rolled pt over a large piece of food fell out of pt's mouth. Initial RA sat 45%, pt was bagged en route and is now awake and talking. Pt reports the last thing he remembers was eating dinner. Pt reports hx of choking on food. Pt endorsing mild SOB.      Pt BIB EMS from home for above complaint. Pt coughing and endorsing mild SOB on arrival. Pt thinks he might still have food stuck in his throat.    Pt changed into gown and connected to monitor. Chart up for ERP.

## 2025-01-16 NOTE — ASSESSMENT & PLAN NOTE
#pleural effusion  #Pulmonary infiltrates   Choking event while eating dinner on 1/15/2025.  Recently became adentulous.  High risk for aspiration.  No O2 demand at baseline, hovering between 3 to 5 L while inpatient.  Per nursing, daughter reports patient would routinely desat to the 80s and 70s at PCP visits.  Significant desats into the 80s-70s while eating meals here.  Recently recovering from viral illness and given his leukocytosis and CT chest demonstrating pulmonary infiltrates and pleural effusion there is concern for post viral pneumonia process. Patient has failed to improve so far remains on 5 L oxygen. Chest x-ray 1/19 showing increasing moderate right base atelectasis and small right pleural effusion, mild left base atelectasis remains the same.  Consulted Dr. Amaro, pulmonology.  He is recommending consulting GI for possible esophageal dysmotility, and IR for thoracentesis, which may help with his ongoing oxygen requirements.  Ceftriaxone and doxycycline adequate coverage for aspiration pneumonia, he suggests total course of 10 days of antibiotics, can transition to Augmentin if patient is able to tolerate orally.  Respiratory panel negative.  Dr. Little, GI, states PEG tube placement may be appropriate if patient is willing, given recurrent aspiration.  Patient does not want this at this time.  IR consulted for thoracentesis.    Pulmonology recs:   Continue IV ceftriaxone and doxycycline x 10 days total  Consider transition to oral Augmentin when appropriate  Legionella and strep antigens pending.  Respiratory culture pending  Trend procalcitonin every 48 hours  IR: Diagnostic and therapeutic thoracentesis ordered.  Fluid labs and culture ordered.  Pending.  GI referral outpatient for manometry.  Titrate oxygen as tolerated   PT/OT recommending home health for continued PT needs, referral sent  DME oxygen ordered, pending approval

## 2025-01-16 NOTE — ASSESSMENT & PLAN NOTE
Chronic.  Was not in A-fib on admission.  Is on apixaban 5 mg twice daily which will be continued while in the hospital.      Continue apixaban 5mg BID

## 2025-01-16 NOTE — PROGRESS NOTES
[facility] MEDICINE PROGRESS NOTE     Attending: Na Gomez M.d.     Resident: Daphney Ramos M.D.     PATIENT: Rogelio Padilla; [id number]; 1950    ID: 74 y.o. male hospital day 1 admitted after a choking incident needing supplemental oxygen and possible pneumonia     SUBJECTIVE: No acute events overnight. Patient currently on 5L of oxygen. He reports that he is feeling well no fevers or chills. He is happy that he is able to have a diet now. Reports some difficulty with deep breaths.     OBJECTIVE:     Vitals:    01/16/25 0359 01/16/25 0515 01/16/25 0851 01/16/25 1059   BP: 139/68 132/68 132/89    Pulse: 66 71 70    Resp: 20 18 16    Temp:  36.4 °C (97.5 °F) 36.1 °C (97 °F)    TempSrc:  Temporal Temporal    SpO2: 93% 92% 90% 93%   Weight:       Height:             Intake/Output Summary (Last 24 hours) at 1/16/2025 1342  Last data filed at 1/15/2025 2327  Gross per 24 hour   Intake 100 ml   Output --   Net 100 ml          PE:   General: No acute distress, resting comfortably in bed.  HEENT: NC/AT. PERRLA. EOMI. MMM  Cardiovascular: Normal S1/S2, RRR, no m/r/g  Respiratory: Symmetric inspiratory effort. Minimal to no breath sounds at bilateral bases with bilateral apical crackles   Abdomen: BS+, soft, NT/ND   EXT:  BRICE, 5/5 strength, 2+ pulses, no rashes, bruising, or bleeding. Mild pitting edema   Neuro: Non focal with no numbness, tingling or changes in sensation    LABS:  Recent Labs     01/15/25  1838 01/16/25  0212   WBC 17.4* 14.4*   RBC 3.32* 3.17*   HEMOGLOBIN 9.2* 9.0*   HEMATOCRIT 30.2* 28.3*   MCV 91.0 89.3   MCH 27.7 28.4   RDW 58.4* 55.7*   PLATELETCT 218 221   MPV 10.8 11.5   NEUTSPOLYS 87.00* 88.60*   LYMPHOCYTES 7.10* 6.40*   MONOCYTES 4.90 3.90   EOSINOPHILS 0.20 0.30   BASOPHILS 0.20 0.20     Recent Labs     01/14/25  0903 01/15/25  1838 01/16/25  0212   SODIUM 138 131* 133*   POTASSIUM 4.8 5.2 4.8   CHLORIDE 101 93* 97   CO2 28 22 24   BUN 24* 52* 46*   CREATININE 0.68 1.09 0.82    CALCIUM 9.5 9.5 9.6   MAGNESIUM  --  2.3  --    ALBUMIN 3.9 3.7 3.8     Estimated GFR/CRCL = Estimated Creatinine Clearance: 74.2 mL/min (by C-G formula based on SCr of 0.82 mg/dL).  Recent Labs     01/14/25  0903 01/15/25  1838 01/16/25  0212   GLUCOSE 200* 438* 349*     Recent Labs     01/14/25  0903 01/15/25  1838 01/16/25  0212   ASTSGOT 10* 69* 50*   ALTSGPT 29 116* 109*   TBILIRUBIN <0.2 0.2 0.2   ALKPHOSPHAT 110* 147* 141*   GLOBULIN 3.1 3.0 3.0   INR  --  1.31*  --              Recent Labs     01/15/25  1838   INR 1.31*   APTT 34.4       MICROBIOLOGY:   Results       Procedure Component Value Units Date/Time    CoV-2, Flu A/B, And RSV by PCR ([employer]) [[id number]] Collected: 01/15/25 2213    Order Status: Completed Specimen: Respirate from Nasopharyngeal Updated: 01/16/25 0011     Influenza virus A RNA Negative     Influenza virus B, PCR Negative     RSV, PCR Negative     SARS-CoV-2 by PCR NotDetected     Comment: [facility] providers: PLEASE REFER TO DE-ESCALATION AND RETESTING PROTOCOL  on [email or web address]    **The [employer] GeneXpert Xpress SARS-CoV-2 RT-PCR Test has been made  available for use under the Emergency Use Authorization (EUA) only.          SARS-CoV-2 Source NP Swab             IMAGING:   DX-ESOPHAGUS - [id number]   Final Result      US-RUQ   Final Result         1.  Gallbladder wall thickening with edema, without visualized shadowing stones, consider acalculous cholecystitis. Could be further evaluated with HIDA scan as clinically appropriate   2.  Echogenic lesion near the hepatic dome, appearance suggesting hemangioma, otherwise indeterminate, could be followed up with three-phase CT liver for further characterization.   3.  Dilatation the right renal pelvis, consider ex renal pelvis morphology or mild hydronephrosis   4.  Hepatomegaly   5.  Echogenic liver, compatible with fatty change versus fibrosis.      CT-CHEST,ABDOMEN,PELVIS WITH   Final Result         1.  Scattered bilateral  pulmonary infiltrates   2.  Small right and trace left pleural effusions.   3.  Gallbladder wall thickening with possible pericholecystic fluid, could be further evaluated with right upper quadrant sonogram   4.  Right hepatic lobe low-density lesion, indeterminate, could be followed up with three-phase CT of the liver for further characterization.   5.  Mild mediastinal adenopathy, consider causes of adenopathy with additional workup as clinically appropriate   6.  Atherosclerosis and atherosclerotic coronary artery disease      DX-CHEST-PORTABLE (1 VIEW)   Final Result      1. Small bilateral pleural effusions and bibasal parenchymal consolidations, greater on the right than the left. Differential diagnosis includes atelectasis and/or pneumonia, with pulmonary edema favored less likely.   2. Atherosclerosis.          MEDS:  Current Facility-Administered Medications   Medication Last Admin    apixaban (Eliquis) tablet 5 mg 5 mg at 01/16/25 1012    atorvastatin (Lipitor) tablet 40 mg 40 mg at 01/16/25 1012    [Held by provider] carvedilol (Coreg) tablet 3.125 mg      [Held by provider] losartan (Cozaar) tablet 25 mg      [Held by provider] spironolactone (Aldactone) tablet 25 mg      sertraline (Zoloft) tablet 25 mg 25 mg at 01/16/25 1012    doxycycline (Vibramycin) 100 mg in  mL IVPB Stopped at 01/16/25 0734    insulin GLARGINE (Lantus,Semglee) injection      And    insulin lispro (HumaLOG,AdmeLOG) subcutaneous injection 3 Units at 01/16/25 1152    And    dextrose 50% (D50W) injection 25 g      senna-docusate (Pericolace Or Senokot S) 8.6-50 MG per tablet 2 Tablet      And    polyethylene glycol/lytes (Miralax) Packet 1 Packet      dextrose 50% (D50W) injection 25 g      cefTRIAXone (Rocephin) syringe 1,000 mg 1,000 mg at 01/16/25 0629    diphenhydrAMINE (Benadryl) injection 25 mg         ASSESSMENT/PLAN: Rogelio Padilla is a 74 y.o. male   * Hypoxia- (present on admission)  Assessment & Plan  #pleural  effusion  #Pulmonary infiltrates   #leukocytosis  Patient with choking event while eating dinner on 1/15/2025.  Recently became adentulous.  He is at high risk for aspiration.  Does not have an oxygen demand at baseline, currently on 4 L nasal cannula and maintaining saturations 90+ percent.  Recently recovering from viral illness and given his leukocytosis and CT chest demonstrating pulmonary infiltrates and pleural effusion there is concern for post viral pneumonia process.  Received doxycycline x 1 in the ED.  His exam did demonstrate faint inspiratory wheezing and faint crackles at the bases with diminished breath sounds throughout.  Given his poor glycemic control I will treat for pneumonia; patient with penicillin allergy so we will go with ceftriaxone and doxycycline.  With mentioning as patient has had an incidental finding of possible cholecystic fluid and gallbladder thickening ceftriaxone may contributed to formation of biliary sludge which should continue to be monitored.    Continue Ceftriaxone and doxycycline   Titrate oxygen as tolerated   CBC daily   If worsening oxygen demand repeat chest xray and labs         Depression  Assessment & Plan  History of depression on zoloft 25mg which will be continued inpatient.     Thickening of wall of gallbladder with pericholecystic fluid- (present on admission)  Assessment & Plan  Incidental finding on CT chest, abdomen, pelvis.  LFTs mildly elevated as is alkaline phosphatase as AST/ALT 69/116.  Alkaline phosphatase 147.  Bilirubin within normal limits.  Exam without tenderness in the right upper quadrant.  Patient is currently on ceftriaxone which can impact and contribute to the formation of biliary sludge. RUQ ultrasound completed with no gallstones noted but wall thickening and possible hemangioma. Patient remains asymptomatic.    Continue to monitor   AM CMP   Will likely need referral to GI outpatient     Anemia- (present on admission)  Assessment &  Plan  Asymptomatic and incidental finding.  MCV within normal limits.  Continue to monitor.    At high risk for aspiration- (present on admission)  Assessment & Plan  Patient presenting following choking on food leading to syncopal episode and hypoxia.  Speech eval and swallow study completed 1/16/25 with recommendations of minced soft foods and therapies     Minced soft diet with thin liquids   Continue to follow speech recommendations   Low threshold for aspiration pneumonia so will monitor closely     Type 2 diabetes mellitus, with long-term current use of insulin (HCC)- (present on admission)  Assessment & Plan  Patient with poorly controlled, A1c in September 2024 was 9.3.  Patient taking Lantus 13 units nightly, no other antiglycemic medications as they were discontinued when he was having hypotensive episodes.  Blood sugar markedly elevated on presentation at 438.  We will update an A1c of 11.1%.    Glargine 13 U   Adjust if patient becomes NPO again to lower dose   Adjust per daily glucose trends   Sliding scale, low correctional   POCT glucose   Hypoglycemic protocol       Pleural effusion- (present on admission)  Assessment & Plan  Please see assessment and plan under hypoxia    Chronic systolic congestive heart failure (HCC)- (present on admission)  Assessment & Plan  Chronic, diagnosed in [place] prior to moving to [place] late in 2024.  He has an echo in October 24 with no comparison available show an ejection fraction of 55 to 60%.  He follows with cardiology at [facility] who notes systolic congestive heart failure.  He is on Coreg 3.125 mg twice daily, losartan 25 mg, spironolactone 25 mg per chart review but is not taking these; no SGLT2 inhibitor but this could be a good addition given his poorly controlled diabetes. His GDMT medications have been stopped by cardiology due to low blood pressures.  Evidence of mild fluid overload on exam with 1+ pitting edema to the mid shins.  Also has pleural effusions  which could be due to heart failure versus pulmonary infection versus flash pulmonary edema after choking event.    Monitor for need for lasix   Continue to hold coreg 3.125 mg, losartan 25 mg and spironolactone 25 mg per notes with cardiology, he has follow-up on 1/23/25 and can discuss then whether or not to restart and consider SGLT2 inhibitor   Strict I/O's   2L fluid restriction     Dyslipidemia- (present on admission)  Assessment & Plan  Chronic.  On atorvastatin 40 mg daily which will be continued while in the hospital    Primary hypertension- (present on admission)  Assessment & Plan  Chronic.  Stable.  Blood pressures on admission ranging 126-140/64-87.  He is receiving Lasix for his pleural effusion and pulmonary edema.  Also has co-occurring heart failure for which he receives carvedilol and spironolactone but has not been taking this since having hypotensive episodes.  We will continue to hold his losartan 25 mg daily and continue to monitor his blood pressure while patient      Monitor blood pressure     Paroxysmal atrial fibrillation (HCC)- (present on admission)  Assessment & Plan  Chronic.  Was not in A-fib on admission.  Is on apixaban 5 mg twice daily which will be continued while in the hospital.      Continue apixaban 5mg BID           Core Measures:  Fluids: None   Lines: PIV   Abx: C3 and doxycycline   Diet: Full liquid   PPX: Apixaban   DISPO: Inpatient management for IV abx and oxygen supplementation. Patient is not cleared for discharge       CODE STATUS: FULL     Daphney Ramos MD  PGY2  [facility]

## 2025-01-16 NOTE — THERAPY
"Speech Language Pathology   Clinical Swallow Evaluation     Patient Name: Rogelio Padilla  AGE:  74 y.o., SEX:  male  Medical Record #: [id number]  Date of Service: 1/16/2025      History of Present Illness  Patient is a 74 y.o. male who presented following choking on his dinner. The patient was found down with a reported pulse ox of 45%.  He was initially unresponsive and required oxygen supplementation.     PMHx: paroxysmal atrial fibrillation, hypertension, hyperlipidemia, diabetes, systolic congestive heart failure, and a recently diagnosed prostate lesion with urology follow-up     CXR:   1. Small bilateral pleural effusions and bibasal parenchymal consolidations, greater on the right than the left. Differential diagnosis includes atelectasis and/or pneumonia, with pulmonary edema favored less likely.  2. Atherosclerosis.      General Information:  Level of Consciousness: Awake, Alert  Orientation: Oriented x 4  Follows Directives: Yes      Prior Living Situation & Level of Function:  Swallowing: Hx of \"choking incidents\" that required pt to present to the ER. Pt reported esophageal dilation about 1 month ago       Oral Mechanism Evaluation:  Dentition: Edentulous   Facial Symmetry: Equal  Labial Observations: WFL   Lingual Observations: Midline         Laryngeal Function:  Secretion Management: Adequate  Voice Quality: WFL   Cough: Perceptually WNL       Subjective  Patient received awake, sitting upright in bed. Reports history of choking events on solid foods. Endorsed consuming regular solids \"cut up very small.\" Pt pleasant and cooperative with SLP evaluation.       Assessment  Current Method of Nutrition: Oral diet (Full liquids)  Positioning: Lemus's (60-90 degrees)  Bolus Administration: Patient  Factor(s) Affecting Performance: None       Swallowing Trials:  Swallowing Trials  Thin Liquid (TN0): WFL  Pureed (PU4): WFL  Regular (RG7): WFL      Comments: Patient able to self-feed without difficulty. " "Adequate oral bolus acceptance and labial assimilation. Complete oral bolus clearance. Functional mastication of regular solids (paul cracker broken into small pieces). No cough/throat clear, vocal wetness, or increased WOB appreciated across all trials. One-two swallows completed per bolus. Discussed options for dysphagia management, including a diagnostic swallow study. Pt expressed desire to proceed with study. MBSS explained in detail; pt stated understanding and agreement to participate.       Clinical Impressions  Patient presents with an elevated risk for oropharyngeal dysphagia given recent choking event causing syncope and hypoxia, along with history of multiple choking events of solids. Pt would benefit from a diagnostic swallow study to further assess swallow function and guide dysphagia management. Recommend continuation of full liquid diet pending study. Pt scheduled to complete MBSS at 9:45 am today.       Recommendations  Diet Consistency: Full liquids pending MBSS  Instrumentation: VFSS (MBSS)  Medication: Whole with puree, Crush with applesauce, as appropriate, Crush with pudding/puree, as appropriate, As tolerated  Supervision: Direct supervision during meals  Positioning: Fully upright and midline during oral intake, Meals sitting upright in a chair, as tolerated  Risk Management : Small bites/sips, Slow rate of intake, Physical mobility, as tolerated  Oral Care: Q4h         SLP Treatment Plan  Treatment Plan: Dysphagia Treatment  SLP Frequency: 3x Per Week  Estimated Duration: Until Therapy Goals Met      Anticipated Discharge Needs  Discharge Recommendations:  (Pending MBSS)       Patient / Family Goals  Patient / Family Goal #1: \"Can I have some breakfast?\"  Short Term Goals  Short Term Goal # 1: Pt will complete a diagnostic swallow study to further assess swallow function and inform ALEJANDRA Edmonds   "

## 2025-01-16 NOTE — ED NOTES
Med Rec completed  per patient     Allergies reviewed: yes    Antibiotics in the past 30 days: no    Anticoagulant in past 14 days: no    Pharmacy patient utilizes: [facility]  [phone]    Per patient he gets injections into both eyes once every 6 weeks for cataracts at [facility] [phone]  Unknown name of medication. Last treatment 12/24/2024      Per daughter at bedside patients Cardiologist took him off of all diuretics back in November because his BP was low.   Patient has an appointment with his Cardiologist this Friday 01/17/2025

## 2025-01-16 NOTE — ASSESSMENT & PLAN NOTE
Incidental finding on CT chest, abdomen, pelvis.  LFTs mildly elevated as is alkaline phosphatase as AST/ALT 69/116.  Alkaline phosphatase 147.  Bilirubin within normal limits.  Exam without tenderness in the right upper quadrant.  Patient is currently on ceftriaxone which can impact and contribute to the formation of biliary sludge. RUQ ultrasound completed with no gallstones noted but wall thickening and possible hemangioma. Patient remains asymptomatic.    Referral to GI outpatient

## 2025-01-16 NOTE — ASSESSMENT & PLAN NOTE
Patient presenting following choking on food leading to syncopal episode and hypoxia.  Speech eval and swallow study completed 1/16/25 with recommendations of minced soft foods and therapies.  Patient continued to desat while eating.  SLP reevaluated and has downgraded patient to puréed diet and thin liquids.  Pulmonology is concern for esophageal dysmotility causing aspiration events.  Patient declines PEG tube at this time.    Puréed diet with thin liquids  Continue to follow speech recommendations   GI referral for manometry

## 2025-01-16 NOTE — ED PROVIDER NOTES
ED Provider Note    CHIEF COMPLAINT  Chief Complaint   Patient presents with    Choking     Pt was found down unresponsive by daughter. On EMS arrival pt was unresponsive and apneic when they rolled pt over a large piece of food fell out of pt's mouth. Initial RA sat 45%, pt was bagged en route and is now awake and talking. Pt reports the last thing he remembers was eating dinner. Pt reports hx of choking on food. Pt endorsing mild SOB.      EXTERNAL RECORDS REVIEWED  Outpatient Notes cardiology with the patient has been seen for A-fib, hypertension and systolic congestive heart failure.  Coagulation therapy, when he was seen previously he had been doing well but had a Occasional dizziness without chest pain or shortness of breath.  Tolerating his prior medications.  He takes Eliquis daily    HPI/ROS  LIMITATION TO HISTORY   Select: : None  OUTSIDE HISTORIAN(S):  none    Rogelio Padilla is a 74 y.o. male who presents emergency room brought in by EMS personnel for an aspiration event.  The patient apparently found unresponsive and apneic and when the family members will him over this a large piece of food fell on the patient's mouth.  EMS was called his oxygen saturations were 45%.  The patient was bagged en route, was de-escalated to oxy mask and was sitting upright on arrival.  He has very limited recollection but does recall eating some broccoli soup.  Feels that he may have choked.  He has not had a difficult family who secretions, he is not having chest pain, he has some issues with chronic A-fib and CHF and says he takes his medications.  He has noticed putting on weight including his abdomen and lower legs.  He been recently treated for left lower extremity sepsis, diabetic foot wound sizes been healed well for some time and he denies any recent fevers chills or pain.    He was initially on OxyMask, was de-escalated during our conversation down to 4 L but requires this for maintaining oxygen saturations.   He feels like his breathing is somewhat more labored, does not have active chest pressure sensations, feels some fullness in his abdomen and notes his legs have been chronically swollen difficult for him to tell if they are significantly worse than baseline.  He does note that he has a history of CHF, does not recall his medications.    PAST MEDICAL HISTORY   has a past medical history of Atrial fibrillation (HCC), CHF (congestive heart failure) (HCC), Diabetes mellitus type 2, noninsulin dependent (HCC), Hyperlipidemia, and Hypertension.    SURGICAL HISTORY   has a past surgical history that includes hernia repair.    FAMILY HISTORY  Family History   Problem Relation Age of Onset    Heart Attack Mother        SOCIAL HISTORY  Social History     Tobacco Use    Smoking status: Never     Passive exposure: Current    Smokeless tobacco: Never   Vaping Use    Vaping status: Never Used   Substance and Sexual Activity    Alcohol use: Not Currently     Alcohol/week: 3.6 oz     Types: 6 Cans of beer per week    Drug use: Never    Sexual activity: Not on file     CURRENT MEDICATIONS  Home Medications       Reviewed by Edilia Odom (Pharmacy Tech) on 01/15/25 at 2117  Med List Status: Complete     Medication Last Dose Status   Alcohol Swabs  Active   apixaban (ELIQUIS) 5mg Tab 1/15/2025 Active   atorvastatin (LIPITOR) 40 MG Tab 1/15/2025 Active   Blood Glucose Meter Kit  Active   Blood Glucose Test Strips  Active   carvedilol (COREG) 3.125 MG Tab  Active   Continuous Glucose  (FREESTYLE HALEY 3 READER) Device  Active   Dulaglutide (TRULICITY) 0.75 MG/0.5ML Solution Auto-injector  Active   glucose blood (ONETOUCH ULTRA) strip  Active   insulin glargine 100 UNIT/ML Solution Pen-injector injection 1/14/2025 Active   Insulin Pen Needle 32 G x 4 mm  Active   Lancets  Active   losartan (COZAAR) 25 MG Tab Not Taking Active   NON SPECIFIED 12/24/2024 Active   sennosides (SENOKOT) 8.6 MG Tab  Active   sertraline (ZOLOFT) 25  "MG tablet 1/15/2025 Active   spironolactone (ALDACTONE) 25 MG Tab  Active                  Audit from Redirected Encounters    **Home medications have not yet been reviewed for this encounter**       ALLERGIES  Allergies   Allergen Reactions    Metformin Diarrhea          Neosporin [Bacitracin-Polymyxin B] Rash          Penicillins Rash           PHYSICAL EXAM  VITAL SIGNS: /87   Pulse (!) 59   Temp (!) 35.5 °C (95.9 °F) (Temporal)   Resp (!) 24   Ht 1.778 m (5' 10\")   Wt 64.4 kg (142 lb)   SpO2 91%   BMI 20.37 kg/m²    Genl: M sitting in gurney comfortably, speaking clearly, appears in very mild distress   Head: NC/AT   ENT: Mucous membranes moist, posterior pharynx clear, uvula midline, nares patent bilaterally   Eyes: Normal sclera, pupils equal round reactive to light  Neck: Supple, FROM, no LAD appreciated   Pulmonary: Lungs are very slightly diminished in the bilateral lower lung fields, no wheezes  Chest: No TTP  CV:  RRR, no murmur appreciated, mild dependent edema in the bilateral lower legs.  No wounds distally  Abdomen: soft, firmness with some generalized discomfort. ND; no rebound/guarding, no masses palpated, no HSM   : no CVA or suprapubic tenderness   Musculoskeletal: Pain free ROM of the neck. Moving upper and lower extremities in spontaneous and coordinated fashion  Neuro: A&Ox4 (person, place, time, situation), speech fluent, gait not assessed, no focal deficits appreciated, No cerebellar signs. Sensation is grossly intact in the distal upper and lower extremities.  5/5 strength in  and dorsiflexion/plantar flexion of the ankles  Psych: Patient has an appropriate affect and behavior  Skin: No rash or lesions.  No pallor or jaundice.  No cyanosis.  Warm and dry.     EKG/LABS  Results for orders placed or performed during the hospital encounter of 01/15/25   EKG   Result Value Ref Range    Report       [facility] Emergency Dept.    Test Date:  2025-01-15  Pt " Name:    DIVYA PRINCE                Department: ER  MRN:        [id number]                      Room:       [facility]  Gender:     Male                         Technician: [id number]  :        1950                   Requested By:ER TRIAGE PROTOCOL  Order #:    [id number]                    Reading MD: Nacho Madrigal MD    Measurements  Intervals                                Axis  Rate:       60                           P:          38  NH:         261                          QRS:        62  QRSD:       94                           T:          56  QT:         432  QTc:        432    Interpretive Statements  Sinus rhythm, rate of 60, NH prolongation, qtc normal no acute ischemia or  infarction.  Unchanged from prior dated 24  Electronically Signed On 01- 19:30:25 PST by Nacho Madrigal MD     I have independently interpreted this EKG    Labs Reviewed   CBC WITH DIFFERENTIAL - Abnormal; Notable for the following components:       Result Value    WBC 17.4 (*)     RBC 3.32 (*)     Hemoglobin 9.2 (*)     Hematocrit 30.2 (*)     MCHC 30.5 (*)     RDW 58.4 (*)     Neutrophils-Polys 87.00 (*)     Lymphocytes 7.10 (*)     Neutrophils (Absolute) 15.13 (*)     All other components within normal limits   COMP METABOLIC PANEL - Abnormal; Notable for the following components:    Sodium 131 (*)     Chloride 93 (*)     Glucose 438 (*)     Bun 52 (*)     AST(SGOT) 69 (*)     ALT(SGPT) 116 (*)     Alkaline Phosphatase 147 (*)     All other components within normal limits   PROBRAIN NATRIURETIC PEPTIDE, NT - Abnormal; Notable for the following components:    NT-proBNP 1680 (*)     All other components within normal limits   TROPONIN - Abnormal; Notable for the following components:    Troponin T 23 (*)     All other components within normal limits   PROTHROMBIN TIME - Abnormal; Notable for the following components:    PT 16.3 (*)     INR 1.31 (*)     All other components within normal limits   APTT   ESTIMATED GFR   PROCALCITONIN    COV-2, FLU A/B, AND RSV BY PCR ([employer])     RADIOLOGY/PROCEDURES   I have independently interpreted the diagnostic imaging associated with this visit and am waiting the final reading from the radiologist.   My preliminary interpretation is as follows: Other bilateral pulmonary infiltrates with small right and trace left pleural effusions.  Some gallbladder wall thickening, right hepatic lobe low-density lesion and some mediastinal adenopathy    Radiologist interpretation:  CT-CHEST,ABDOMEN,PELVIS WITH   Final Result         1.  Scattered bilateral pulmonary infiltrates   2.  Small right and trace left pleural effusions.   3.  Gallbladder wall thickening with possible pericholecystic fluid, could be further evaluated with right upper quadrant sonogram   4.  Right hepatic lobe low-density lesion, indeterminate, could be followed up with three-phase CT of the liver for further characterization.   5.  Mild mediastinal adenopathy, consider causes of adenopathy with additional workup as clinically appropriate   6.  Atherosclerosis and atherosclerotic coronary artery disease      DX-CHEST-PORTABLE (1 VIEW)   Final Result      1. Small bilateral pleural effusions and bibasal parenchymal consolidations, greater on the right than the left. Differential diagnosis includes atelectasis and/or pneumonia, with pulmonary edema favored less likely.   2. Atherosclerosis.      US-RUQ    (Results Pending)     COURSE & MEDICAL DECISION MAKING    ASSESSMENT, COURSE AND PLAN  Care Narrative: The patient presents to the emergency room for symptoms as described above.  The patient describes in general subtle recent history of viral syndrome in the family, have had couple of coughing episodes and known history of CHF.  He has been off his diuretics because of some blood pressure issues going back into the last fall.  Acutely he was coming in with some hypoxia following an aspiration event and choking that required oxygen but came back to  a intact mental status.  He was not in acute respiratory distress, required subtle amounts of 2 to 4 L of oxygen and chest x-ray showed possible developing bilateral pneumonias.  Hard to know if this is acute from pneumonitis versus his most recent upper respiratory symptomology.  He does have a leukocytosis, has no active skin lesions and denies any fevers or chills.  He is not tachycardic or hypotensive here.    Lab work showed a leukocytosis with a leftward shift but no bandemia.  This could be stress to margination from his acute hypoxic injury versus underlying infectious etiology.  Procalcitonin is currently pending.  He had nonspecific LFT elevations with no signs of obstructive change, no other gross electrolyte abnormalities.  Troponin is detectable at 23, BNP slightly elevated at 1680 has some fullness in his abdomen without localizing tenderness.  With his combination of lower lobe processes, aspiration event, CHF I did obtain a CT that went through the chest abdomen pelvis to assess any possible underlying etiology.  This came back showing some nonspecific lower effusions, scattered bilateral pulmonary infiltrates, some gallbladder wall thickening.  He does not have localizing right upper quadrant tenderness and remains afebrile.  No postprandial pain.  Uncertain if this is truly gallbladder disease we will obtain an ultrasound.  Will be given antibiosis for presumed pneumonia and small amount of diuretic as he is evidence of fluid retention in his abdomen and lower extremities.    Patient continues to require oxygen support, he is not an extremis, no escalation at this time he can be admitted to [facility] medicine.  Aspiration pneumonitis versus pneumonia, leukocytosis possible pneumonia versus cholecystitis but awaiting the formal ultrasound the patient is without localizing tenderness.  Elevated BNP in the setting of known systolic heart failure and coming off of his diuretics.    FINAL DIAGNOSIS  1.  Hypoxia    2. Choking episode    3. Aspiration into airway, initial encounter    4. Leukocytosis, unspecified type    5. Pneumonia of both lower lobes due to infectious organism      Electronically signed by: Rohan Griffith M.D., 1/15/2025 7:16 PM

## 2025-01-16 NOTE — ED NOTES
Bedside report received from off going RN Louise, assumed care of patient.  POC discussed with patient. Call light within reach, all needs addressed at this time.       Fall risk interventions in place: Patient's personal possessions are with in their safe reach, Place fall risk sign on patient's door, Give patient urinal if applicable, Keep floor surfaces clean and dry, and Accompanied to restroom (all applicable per [place] risk assessment)   Continuous monitoring: Cardiac Leads, Pulse Ox, or Blood Pressure  IVF/IV medications: Not Applicable   Oxygen: How many liters 5L  Bedside sitter: Not Applicable   Isolation: Not Applicable    Patient sleeping, not in respiratory distress

## 2025-01-16 NOTE — ASSESSMENT & PLAN NOTE
Chronic.  Stable.  Blood pressures on admission ranging 126-140/64-87.  He is receiving Lasix for his pleural effusion and pulmonary edema.  Also has co-occurring heart failure for which he receives carvedilol and spironolactone but has not been taking this since having hypotensive episodes.  Continue to hold his losartan 25 mg daily and continue to monitor his blood pressure while patient      Monitor blood pressure

## 2025-01-16 NOTE — ASSESSMENT & PLAN NOTE
Chronic, diagnosed in [place] prior to moving to [place] late in 2024.  Echo in October 24 with no comparison available show an ejection fraction of 55 to 60%.  He follows with cardiology at [facility] who notes systolic congestive heart failure.  He is on Coreg 3.125 mg twice daily, losartan 25 mg, spironolactone 25 mg per chart review but is not taking these; no SGLT2 inhibitor but this could be a good addition given his poorly controlled diabetes. His GDMT medications have been stopped by cardiology due to low blood pressures.  Evidence of mild fluid overload on exam with 1+ pitting edema to the mid shins.  Also has pleural effusions which could be due to heart failure versus pulmonary infection versus flash pulmonary edema after choking event.    Monitor for need for lasix   Continue to hold coreg 3.125 mg, losartan 25 mg and spironolactone 25 mg per notes with cardiology, he has follow-up on 1/23/25 and can discuss then whether or not to restart and consider SGLT2 inhibitor   Strict I/O's   2L fluid restriction

## 2025-01-16 NOTE — H&P
[facility] MEDICINE HISTORY AND PHYSICAL     PATIENT ID:  NAME:  Rogelio Padilla  MRN:               [id number]  YOB: 1950    Date of Admission: 1/15/2025     Attending: Dr. Madera    Resident: Brando Leroy MD    Primary Care Physician:  Narciso Rose M.D.    CC:    Chief Complaint   Patient presents with    Choking     Pt was found down unresponsive by daughter. On EMS arrival pt was unresponsive and apneic when they rolled pt over a large piece of food fell out of pt's mouth. Initial RA sat 45%, pt was bagged en route and is now awake and talking. Pt reports the last thing he remembers was eating dinner. Pt reports hx of choking on food. Pt endorsing mild SOB.        HPI: Rogelio Padilla is a 74 y.o. male with a past medical history of paroxysmal atrial fibrillation, hypertension, hyperlipidemia, diabetes, systolic congestive heart failure, and a recently diagnosed prostate lesion with urology follow-up who presented following choking on his dinner.  The patient was found down with a reported pulse ox of 45%.  He was initially unresponsive and required oxygen supplementation.  By the time I was gathering history he was on 4 L of oxygen and able to speak in full sentences.  His daughter was present at bedside.  They report that he choked on a cracker yesterday and while eating some broccoli cheddar soup today he had seemingly completed his meal but when walking into the living room from the kitchen fell.  The daughter reports he did not have a head strike and that he was telling her he felt weak.  The patient then crawled to the end of the room to attempt to get up using the couch and fell back.  It was at that time he was not nonresponsive.  The daughter called EMS and noted that he was having 1 breath every 10 to 15 seconds reports his ears were blue.  She denies that his lips were blue.  Further history taking reveals that within the household everyone developed an illness over Christmas  and New Year's.  Rogelio got the illness after everyone else.  His symptoms of this illness include cough, blood sugar extremes, diarrhea some days and constipation others.  He also endorses shortness of breath, weakness, and peripheral edema.  He denies headache, chest pain, fever or chills, rashes, nausea or vomiting, polyuria, or dysuria.    ERCourse:  Initially on presentation saturation was 79% but improved to 90 to 96%.  Initially on 15 L oxime mask then on 2 to 4 L nasal cannula.  Initial temperature 95.9 °F.  60 bpm heart rate.  Respiratory rate 14-24.  -140/64-87.  17.4 leukocytosis with 87% left shift.  H/H 9.2/30.2.  Mild hyponatremia of 131, hypochloremia of 93.  BUN 52, creatinine 1.09, 72 GFR.  AST/ALT 69/116.  Alkaline phosphatase 147.  Blood glucose 438 within 9.3 A1c in September.  Troponin mildly elevated at 23.  BNP 1680.  CT C/A/P w/ showed scattered bilateral pulmonary infiltrates, bilateral small pleural effusions, gallbladder wall thickening with pericystic cholecystic fluid possible, right hepatic lobe low-density lesion, mild mediastinal adenopathy, atherosclerosis, and atherosclerotic CAD.  CXR redemonstrated small bilateral pulmonary effusion and bibasilar consolidations.  EKG showed sinus rhythm at 60 with SD prolongation, QTc normal, no acute ischemia or infarct and unchanged from 11/1/2024.  He received doxycycline, 20 mg IV Lasix.  A right upper quadrant ultrasound is pending at this time.    REVIEW OF SYSTEMS:   Ten systems reviewed and were negative except as noted in the HPI.                PAST MEDICAL HISTORY:  Past Medical History:   Diagnosis Date    Atrial fibrillation (HCC)     CHF (congestive heart failure) (HCC)     Diabetes mellitus type 2, noninsulin dependent (HCC)     Hyperlipidemia     Hypertension        PAST SURGICAL HISTORY:  Past Surgical History:   Procedure Laterality Date    HERNIA REPAIR         FAMILY HISTORY:  Family History   Problem Relation Age of  Onset    Heart Attack Mother        SOCIAL HISTORY:   Pt lives in [place] with his daughter, grandchild, and in-laws  Smoking denies but reports 50+ years of passive exposure  Etoh use denies, reports his last use was many months ago  Drug use denies including marijuana    DIET:   Orders Placed This Encounter   Procedures    Diet Order Diet: Full Liquid; Second Modifier: (optional): (Low-sodium)     Standing Status:   Standing     Number of Occurrences:   1     Order Specific Question:   Diet:     Answer:   Full Liquid [11]       ALLERGIES:  Allergies   Allergen Reactions    Metformin Diarrhea          Neosporin [Bacitracin-Polymyxin B] Rash          Penicillins Rash             OUTPATIENT MEDICATIONS:    Current Facility-Administered Medications:     apixaban (Eliquis) tablet 5 mg, 5 mg, Oral, BID, Brando Leroy M.D.    atorvastatin (Lipitor) tablet 40 mg, 40 mg, Oral, DAILY, Brando Leroy M.D.    [Held by provider] carvedilol (Coreg) tablet 3.125 mg, 3.125 mg, Oral, BID, Brando Leroy M.D.    [Held by provider] losartan (Cozaar) tablet 25 mg, 25 mg, Oral, DAILY, Brando Leroy M.D.    [Held by provider] spironolactone (Aldactone) tablet 25 mg, 25 mg, Oral, DAILY, Brando Leroy M.D.    sertraline (Zoloft) tablet 25 mg, 25 mg, Oral, DAILY, Brando Leroy M.D.    doxycycline (Vibramycin) 100 mg in  mL IVPB, 100 mg, Intravenous, Q12HRS, Brando Leroy M.D.    senna-docusate (Pericolace Or Senokot S) 8.6-50 MG per tablet 2 Tablet, 2 Tablet, Oral, QHS PRN **AND** polyethylene glycol/lytes (Miralax) Packet 1 Packet, 1 Packet, Oral, QDAY PRN, Brando Leroy M.D.    insulin GLARGINE (Lantus,Semglee) injection, 0.2 Units/kg/day, Subcutaneous, Q EVENING **AND** [DISCONTINUED] insulin lispro (HumaLOG,AdmeLOG) subcutaneous injection, 0.2 Units/kg/day, Subcutaneous, TID AC **AND** insulin lispro (HumaLOG,AdmeLOG) subcutaneous injection, 1-6 Units, Subcutaneous, 4X/DAY ACHS **AND** POC blood glucose manual  result, , , Q AC AND BEDTIME(S) **AND** NOTIFY MD and PharmD, , , Once **AND** Administer 20 grams of glucose (approximately 8 ounces of fruit juice) every 15 minutes PRN FSBG less than 70 mg/dL, , , PRN **AND** dextrose 50% (D50W) injection 25 g, 25 g, Intravenous, Q15 MIN PRN, Brando Leroy M.D.    furosemide (Lasix) injection 20 mg, 20 mg, Intravenous, Once, Brando Leroy M.D.    Notify MD and PharmD if FSBG level is less than or equal to 70 mg/dL or patient is showing signs/symptoms of hypoglycemia (tachycardia, palpitations, diaphoresis, clammy, tremulousness, nausea, confused), , , Once **AND** Administer 20 grams of glucose (approximately 8 ounces of fruit juice) every 15 minutes PRN FSBS less than 70 mg/dL, , , PRN **AND** dextrose 50% (D50W) injection 25 g, 25 g, Intravenous, Q15 MIN PRN, Brando Leroy M.D.    cefTRIAXone (Rocephin) syringe 1,000 mg, 1,000 mg, Intravenous, Q24HRS, Brando Leroy M.D.    diphenhydrAMINE (Benadryl) injection 25 mg, 25 mg, Intravenous, Q6HRS PRN, Brando Leroy M.D.    Current Outpatient Medications:     NON SPECIFIED, Administer 1 Dose into affected eye(s). Injections into both eyes every 6 weeks, Disp: , Rfl:     atorvastatin (LIPITOR) 40 MG Tab, Take 1 Tablet by mouth every day., Disp: 90 Tablet, Rfl: 3    apixaban (ELIQUIS) 5mg Tab, Take 1 Tablet by mouth 2 times a day., Disp: 180 Tablet, Rfl: 3    sertraline (ZOLOFT) 25 MG tablet, Take 1 Tablet by mouth every day., Disp: 30 Tablet, Rfl: 11    glucose blood (ONETOUCH ULTRA) strip, 1 Strip by Other route every morning. Or as needed for any symptoms of hypoglygemia or hyperglycemia., Disp: 100 Strip, Rfl: 5    Blood Glucose Test Strips, Use one Embrace pro strip to test blood sugar three times daily., Disp: 100 Strip, Rfl: 0    Lancets, Use one Embrace pro lancet to test blood sugar three times daily., Disp: 100 Each, Rfl: 0    Insulin Pen Needle 32 G x 4 mm, Use one pen needle in pen device to inject insulin  once daily., Disp: 100 Each, Rfl: 3    Continuous Glucose  (FREESTYLE HALEY 3 READER) Device, 1 Units every day. Use daily for continuous blood glucose monitoring, Disp: 1 Each, Rfl: 0    spironolactone (ALDACTONE) 25 MG Tab, Take 1 Tablet by mouth every day. (Patient not taking: Reported on 2024), Disp: 90 Tablet, Rfl: 3    losartan (COZAAR) 25 MG Tab, Take 1 Tablet by mouth every day. (Patient not taking: Reported on 1/15/2025), Disp: 90 Tablet, Rfl: 3    Blood Glucose Meter Kit, Test blood sugar as recommended by provider. Embrace pro blood glucose monitoring kit., Disp: 1 Kit, Rfl: 0    Alcohol Swabs, Wipe site with prep pad prior to injection., Disp: 100 Each, Rfl: 2    carvedilol (COREG) 3.125 MG Tab, Take 3.125 mg by mouth 2 times a day. (Patient not taking: Reported on 2024), Disp: , Rfl:     PHYSICAL EXAM:  Vitals:    01/15/25 2000 01/15/25 2200 01/15/25 2300 25 0000   BP: 126/87 (!) 144/73 (!) 157/75 (!) 155/79   Pulse: (!) 59 60 62 65   Resp: (!) 24 16 17 (!) 21   Temp:  35.8 °C (96.5 °F)     TempSrc:  Temporal     SpO2: 91% 93% 91% 91%   Weight:       Height:       , Temp (24hrs), Av.7 °C (96.2 °F), Min:35.5 °C (95.9 °F), Max:35.8 °C (96.5 °F)  , Pulse Oximetry: 91 %, O2 (LPM): 5, O2 Delivery Device: Nasal Cannula    General: Pt resting, cooperative daughter at bedside also providing part of the history  Skin:  Pink, warm and dry.  No rashes  HEENT: NC/AT. PERRL. EOMI. MMM, food debris still in the oropharynx.  Adentulous no nasal discharge. Oropharynx nonerythematous without exudate/plaques  Neck:  Supple without lymphadenopathy or rigidity.  Lungs:  Symmetrical.  Diminished breath sounds mildly throughout.  Faint inspiratory wheeze in the left lower lobe.  Mild bibasilar crackles.  Cardiovascular:  Normal S1/S2, RRR without M/R/G.  Mild pitting edema limited to the midshin.  No JVD or hepatojugular reflux  Abdomen:  BS+, Soft, NT/ND. No masses noted.  Extremities:  No  gross deformities noted. 2+ radial pulses.  CNS:  A&Ox4, follows commands.         LAB TESTS:   Recent Labs     01/15/25  1838   WBC 17.4*   RBC 3.32*   HEMOGLOBIN 9.2*   HEMATOCRIT 30.2*   MCV 91.0   MCH 27.7   RDW 58.4*   PLATELETCT 218   MPV 10.8   NEUTSPOLYS 87.00*   LYMPHOCYTES 7.10*   MONOCYTES 4.90   EOSINOPHILS 0.20   BASOPHILS 0.20         Recent Labs     01/14/25  0903 01/15/25  1838   SODIUM 138 131*   POTASSIUM 4.8 5.2   CHLORIDE 101 93*   CO2 28 22   BUN 24* 52*   CREATININE 0.68 1.09   CALCIUM 9.5 9.5   MAGNESIUM  --  2.3   ALBUMIN 3.9 3.7       CULTURES:   Results       Procedure Component Value Units Date/Time    CoV-2, Flu A/B, And RSV by PCR ([email or web address]) [[id number]] Collected: 01/15/25 2213    Order Status: Completed Specimen: Respirate from Nasopharyngeal Updated: 01/16/25 0011     Influenza virus A RNA Negative     Influenza virus B, PCR Negative     RSV, PCR Negative     SARS-CoV-2 by PCR NotDetected     Comment: [facility] providers: PLEASE REFER TO DE-ESCALATION AND RETESTING PROTOCOL  on [email or web address]    **The [email or web address] GeneXpert Xpress SARS-CoV-2 RT-PCR Test has been made  available for use under the Emergency Use Authorization (EUA) only.          SARS-CoV-2 Source NP Swab            IMAGES:  US-RUQ   Final Result         1.  Gallbladder wall thickening with edema, without visualized shadowing stones, consider acalculous cholecystitis. Could be further evaluated with HIDA scan as clinically appropriate   2.  Echogenic lesion near the hepatic dome, appearance suggesting hemangioma, otherwise indeterminate, could be followed up with three-phase CT liver for further characterization.   3.  Dilatation the right renal pelvis, consider ex renal pelvis morphology or mild hydronephrosis   4.  Hepatomegaly   5.  Echogenic liver, compatible with fatty change versus fibrosis.      CT-CHEST,ABDOMEN,PELVIS WITH   Final Result         1.  Scattered bilateral pulmonary infiltrates   2.  Small right and trace  left pleural effusions.   3.  Gallbladder wall thickening with possible pericholecystic fluid, could be further evaluated with right upper quadrant sonogram   4.  Right hepatic lobe low-density lesion, indeterminate, could be followed up with three-phase CT of the liver for further characterization.   5.  Mild mediastinal adenopathy, consider causes of adenopathy with additional workup as clinically appropriate   6.  Atherosclerosis and atherosclerotic coronary artery disease      DX-CHEST-PORTABLE (1 VIEW)   Final Result      1. Small bilateral pleural effusions and bibasal parenchymal consolidations, greater on the right than the left. Differential diagnosis includes atelectasis and/or pneumonia, with pulmonary edema favored less likely.   2. Atherosclerosis.          CONSULTS:   None    ASSESSMENT/PLAN: 74 y.o. male admitted following choking on food being unconscious on the ground requiring oxygen supplementation.  He has been recovering from a viral illness: He has not had fever or chills but has had shortness of breath and a cough.  CT of the chest demonstrated scattered bilateral pulmonary infiltrates and bilateral small pulmonary effusions.  Additionally on CT abdomen pelvis there is a thickened gallbladder wall and possible pericholecystic fluid.  His exam shows inspiratory wheeze, and faint crackles; he is requiring 4 L of nasal cannula oxygen at this time.  Nontender and definitely does not have a surgical abdomen at this time.  His leukocytosis could represent a viral infection but there would be concern for a postviral pneumonia, low suspicion for gallbladder pathology at this time.  He got 1 dose of doxycycline in the ED in addition to 20 mg of IV Lasix.    Hypoxia  #pleural effusion  #Pulmonary infiltrates   #leukocytosis  Patient with choking event while eating dinner on 1/15/2025.  Recently became adentulous.  He is at high risk for aspiration.  Does not have an oxygen demand at baseline, currently  on 4 L nasal cannula and maintaining saturations 90+ percent.  Recently recovering from viral illness and given his leukocytosis and CT chest demonstrating pulmonary infiltrates and pleural effusion there is concern for post viral pneumonia process.  Received doxycycline x 1 in the ED.  His exam did demonstrate faint inspiratory wheezing and faint crackles at the bases with diminished breath sounds throughout.  Given his poor glycemic control I will treat for pneumonia; patient with penicillin allergy so we will go with ceftriaxone and doxycycline.  With mentioning as patient has had an incidental finding of possible cholecystic fluid and gallbladder thickening to her ceftriaxone may contributed to formation of biliary sludge which should continue to be monitored.  -Admit to medicine  -Begin ceftriaxone and doxycycline antibiotic therapy  -Titrate oxygen supplementation as tolerated  -Continue to monitor CBC    At high risk for aspiration  Patient presenting following choking on food leading to syncopal episode and hypoxia.  He will need a speech evaluation and swallow study.  I have provided him with a full liquid diet until these can be performed for liquid diet  -Full liquid diet  -Speech evaluation   -swallow study    Anemia  Asymptomatic and incidental finding.  MCV within normal limits.  Continue to monitor.    Pleural effusion  Please see assessment and plan under hypoxia    Thickening of wall of gallbladder with pericholecystic fluid  Incidental finding on CT chest, abdomen, pelvis.  LFTs mildly elevated as is alkaline phosphatase as AST/ALT 69/116.  Alkaline phosphatase 147.  Bilirubin within normal limits.  Exam without tenderness in the right upper quadrant.  Patient is currently on ceftriaxone which can impact and contribute to the formation of biliary sludge.  -Right upper quadrant ultrasound pending  -Continue to monitor    Type 2 diabetes mellitus, with long-term current use of insulin (HCC)  Patient  with poorly controlled, A1c in September 2024 was 9.3.  Patient taking Lantus 13 units nightly, no other antiglycemic medications as they were discontinued when he was having hypotensive episodes.  Blood sugar markedly elevated on presentation at 438.  We will update an A1c, provide his 13 units of Lantus nightly, provide correctional mealtime insulin at the lowest level and titrate to his blood sugars.  -Lantus 13 units nightly  -Mealtime lispro, low intensity  -Blood sugars nightly, before every meal  -Hypoglycemia protocol in place  -Update A1c    Dyslipidemia  Chronic.  On atorvastatin 40 mg daily which will be continued while in the hospital    Paroxysmal atrial fibrillation (HCC)  Chronic.  Was not in A-fib for my exam on admission.  Is on apixaban 5 mg twice daily which will be continued while in the hospital  -Continue apixaban 5 mg daily    Primary hypertension  Chronic.  Stable.  Blood pressures on admission ranging 126-140/64-87.  He is receiving Lasix for his pleural effusion and pulmonary edema.  Also has co-occurring heart failure for which he receives carvedilol and spironolactone but has not been taking this since having hypotensive episodes.  We will continue to hold his losartan 25 mg daily and continue to monitor his blood pressure while patient  -Continue to hold losartan 25 mg    Chronic systolic congestive heart failure (HCC)  Chronic, diagnosed in [place] prior to moving to [facility] in 2024.  He has an echo in October 24 with no comparison available show an ejection fraction of 55 to 60%.  He follows with cardiology at [facility] who notes systolic congestive heart failure.  He is on Coreg 3.125 mg twice daily, losartan 25 mg, spironolactone 25 mg per chart review but is not taking these; no SGLT2 inhibitor but this could be a good addition given his poorly controlled diabetes.  Evidence of mild fluid overload on exam with 1+ pitting edema to the mid shins.  Also has pleural effusions which could  be due to heart failure versus pulmonary infection versus flash pulmonary edema after choking event.  -Received Lasix 20 mg IV one-time in ED  -Providing Lasix 20 mg IV one-time tomorrow morning  -Continue to hold Coreg 3.125 mg twice daily  -Continue to hold losartan 25 mg daily  -Continue to hold spironolactone 25 mg daily  -Consider SGLT2 inhibitor  -Ins and outs ordered  -Following speech evaluation recommend low-sodium diet that lowers aspiration risk   -I have ordered low-sodium modifier for his full liquid diet at this time  -2 L fluid restriction    Depression  History of depression on zoloft 25mg which will be continued inpatient.       Core Measures:  Fluids: None, 2 L fluid restriction  Lines: PIV right AC  Abx: Doxycycline and ceftriaxone  Diet: Full liquid diet, low-sodium  PPX: Apixaban  DISPO: Admitted for hypoxia and possible pneumonia    CODE STATUS: Full      Brando Leroy MD  PGY2  [facility]

## 2025-01-16 NOTE — ASSESSMENT & PLAN NOTE
Patient with poorly controlled, A1c in September 2024 was 9.3.  Patient taking Lantus 13 units nightly, no other antiglycemic medications as they were discontinued when he was having hypotensive episodes.  Blood sugar markedly elevated on presentation at 438.  We will update an A1c of 11.1%.  Blood sugars steadily improving while inpatient.  Patient blood glucose shown to be consistently low in the 70s to 80s after basal insulin.  During the day blood glucose elevated in the 200s.  Will readjust insulin.    Decrease glargine 10 U   Adjust if patient becomes NPO again to lower dose   Adjust per daily glucose trends   Increased correctional  POCT glucose   Hypoglycemic protocol

## 2025-01-17 LAB
ALBUMIN SERPL BCP-MCNC: 3.4 G/DL (ref 3.2–4.9)
ALBUMIN/GLOB SERPL: 1.1 G/DL
ALP SERPL-CCNC: 133 U/L (ref 30–99)
ALT SERPL-CCNC: 85 U/L (ref 2–50)
ANION GAP SERPL CALC-SCNC: 10 MMOL/L (ref 7–16)
AST SERPL-CCNC: 27 U/L (ref 12–45)
BILIRUB SERPL-MCNC: 0.2 MG/DL (ref 0.1–1.5)
BUN SERPL-MCNC: 28 MG/DL (ref 8–22)
CALCIUM ALBUM COR SERPL-MCNC: 9.9 MG/DL (ref 8.5–10.5)
CALCIUM SERPL-MCNC: 9.4 MG/DL (ref 8.5–10.5)
CHLORIDE SERPL-SCNC: 99 MMOL/L (ref 96–112)
CO2 SERPL-SCNC: 27 MMOL/L (ref 20–33)
CREAT SERPL-MCNC: 0.65 MG/DL (ref 0.5–1.4)
ERYTHROCYTE [DISTWIDTH] IN BLOOD BY AUTOMATED COUNT: 56.4 FL (ref 35.9–50)
GFR SERPLBLD CREATININE-BSD FMLA CKD-EPI: 100 ML/MIN/1.73 M 2
GLOBULIN SER CALC-MCNC: 3 G/DL (ref 1.9–3.5)
GLUCOSE BLD STRIP.AUTO-MCNC: 102 MG/DL (ref 65–99)
GLUCOSE BLD STRIP.AUTO-MCNC: 118 MG/DL (ref 65–99)
GLUCOSE BLD STRIP.AUTO-MCNC: 186 MG/DL (ref 65–99)
GLUCOSE BLD STRIP.AUTO-MCNC: 90 MG/DL (ref 65–99)
GLUCOSE SERPL-MCNC: 217 MG/DL (ref 65–99)
HCT VFR BLD AUTO: 28.5 % (ref 42–52)
HGB BLD-MCNC: 9 G/DL (ref 14–18)
MCH RBC QN AUTO: 28 PG (ref 27–33)
MCHC RBC AUTO-ENTMCNC: 31.6 G/DL (ref 32.3–36.5)
MCV RBC AUTO: 88.8 FL (ref 81.4–97.8)
PLATELET # BLD AUTO: 224 K/UL (ref 164–446)
PMV BLD AUTO: 11.3 FL (ref 9–12.9)
POTASSIUM SERPL-SCNC: 4.3 MMOL/L (ref 3.6–5.5)
PROT SERPL-MCNC: 6.4 G/DL (ref 6–8.2)
RBC # BLD AUTO: 3.21 M/UL (ref 4.7–6.1)
SODIUM SERPL-SCNC: 136 MMOL/L (ref 135–145)
WBC # BLD AUTO: 11.2 K/UL (ref 4.8–10.8)

## 2025-01-17 PROCEDURE — 97535 SELF CARE MNGMENT TRAINING: CPT

## 2025-01-17 PROCEDURE — 82962 GLUCOSE BLOOD TEST: CPT | Mod: 91

## 2025-01-17 PROCEDURE — A9270 NON-COVERED ITEM OR SERVICE: HCPCS

## 2025-01-17 PROCEDURE — 36415 COLL VENOUS BLD VENIPUNCTURE: CPT

## 2025-01-17 PROCEDURE — 700111 HCHG RX REV CODE 636 W/ 250 OVERRIDE (IP)

## 2025-01-17 PROCEDURE — 700101 HCHG RX REV CODE 250

## 2025-01-17 PROCEDURE — 97163 PT EVAL HIGH COMPLEX 45 MIN: CPT

## 2025-01-17 PROCEDURE — 770006 HCHG ROOM/CARE - MED/SURG/GYN SEMI*

## 2025-01-17 PROCEDURE — 80053 COMPREHEN METABOLIC PANEL: CPT

## 2025-01-17 PROCEDURE — 700102 HCHG RX REV CODE 250 W/ 637 OVERRIDE(OP)

## 2025-01-17 PROCEDURE — 85027 COMPLETE CBC AUTOMATED: CPT

## 2025-01-17 PROCEDURE — 92523 SPEECH SOUND LANG COMPREHEN: CPT

## 2025-01-17 PROCEDURE — 92526 ORAL FUNCTION THERAPY: CPT

## 2025-01-17 PROCEDURE — 700105 HCHG RX REV CODE 258

## 2025-01-17 PROCEDURE — 97166 OT EVAL MOD COMPLEX 45 MIN: CPT

## 2025-01-17 PROCEDURE — 99232 SBSQ HOSP IP/OBS MODERATE 35: CPT | Performed by: STUDENT IN AN ORGANIZED HEALTH CARE EDUCATION/TRAINING PROGRAM

## 2025-01-17 RX ORDER — DOXYCYCLINE 100 MG/1
100 TABLET ORAL EVERY 12 HOURS
Status: DISCONTINUED | OUTPATIENT
Start: 2025-01-17 | End: 2025-01-22 | Stop reason: HOSPADM

## 2025-01-17 RX ADMIN — ATORVASTATIN CALCIUM 40 MG: 40 TABLET, FILM COATED ORAL at 05:10

## 2025-01-17 RX ADMIN — DOXYCYCLINE 100 MG: 100 INJECTION, POWDER, LYOPHILIZED, FOR SOLUTION INTRAVENOUS at 05:06

## 2025-01-17 RX ADMIN — DOXYCYCLINE 100 MG: 100 TABLET, FILM COATED ORAL at 18:08

## 2025-01-17 RX ADMIN — APIXABAN 5 MG: 5 TABLET, FILM COATED ORAL at 18:08

## 2025-01-17 RX ADMIN — APIXABAN 5 MG: 5 TABLET, FILM COATED ORAL at 05:10

## 2025-01-17 RX ADMIN — INSULIN LISPRO 1 UNITS: 100 INJECTION, SOLUTION INTRAVENOUS; SUBCUTANEOUS at 21:59

## 2025-01-17 RX ADMIN — SERTRALINE HYDROCHLORIDE 25 MG: 50 TABLET ORAL at 05:10

## 2025-01-17 RX ADMIN — CEFTRIAXONE SODIUM 1000 MG: 10 INJECTION, POWDER, FOR SOLUTION INTRAVENOUS at 05:11

## 2025-01-17 ASSESSMENT — ACTIVITIES OF DAILY LIVING (ADL): TOILETING: INDEPENDENT

## 2025-01-17 ASSESSMENT — GAIT ASSESSMENTS
GAIT LEVEL OF ASSIST: CONTACT GUARD ASSIST
DEVIATION: SHUFFLED GAIT
DISTANCE (FEET): 50

## 2025-01-17 ASSESSMENT — COGNITIVE AND FUNCTIONAL STATUS - GENERAL
HELP NEEDED FOR BATHING: A LITTLE
DAILY ACTIVITIY SCORE: 18
WALKING IN HOSPITAL ROOM: A LITTLE
SUGGESTED CMS G CODE MODIFIER DAILY ACTIVITY: CK
DRESSING REGULAR LOWER BODY CLOTHING: A LITTLE
MOVING FROM LYING ON BACK TO SITTING ON SIDE OF FLAT BED: A LITTLE
TOILETING: A LITTLE
STANDING UP FROM CHAIR USING ARMS: A LITTLE
MOVING TO AND FROM BED TO CHAIR: A LITTLE
MOBILITY SCORE: 19
PERSONAL GROOMING: A LITTLE
EATING MEALS: A LITTLE
SUGGESTED CMS G CODE MODIFIER MOBILITY: CK
CLIMB 3 TO 5 STEPS WITH RAILING: A LITTLE
DRESSING REGULAR UPPER BODY CLOTHING: A LITTLE

## 2025-01-17 ASSESSMENT — PAIN DESCRIPTION - PAIN TYPE
TYPE: ACUTE PAIN

## 2025-01-17 NOTE — PROGRESS NOTES
[facility] FAMILY MEDICINE PROGRESS NOTE     Attending: Na Gomez M.d.     Senior Resident: Daphney Ramos M.D.     Juventino Resident: Socrates Griffin M.D.     PATIENT: Rogelio Padilla; [id number]; 1950    ID: 74 y.o. male hospital day 2 admitted after a choking incident needing supplemental oxygen and possible pneumonia     SUBJECTIVE: No acute events overnight, however he was on up to 4 L overnight. Patient currently on 3L of oxygen, then later put on 4 L for increased requirements. He reports that he is feeling okay overall, no fevers or chills.  Later in the morning he did desat to 78% while eating breakfast.    OBJECTIVE:     Vitals:    01/16/25 1928 01/17/25 0425 01/17/25 0750 01/17/25 1030   BP: 128/71 134/75 125/57    Pulse: 73 75 74    Resp: 16 16 16    Temp: 36.3 °C (97.3 °F) 36.3 °C (97.3 °F) 36.9 °C (98.4 °F)    TempSrc: Temporal Temporal Temporal    SpO2: 92% 94% 90% 94%   Weight:       Height:             Intake/Output Summary (Last 24 hours) at 1/17/2025 1403  Last data filed at 1/16/2025 2129  Gross per 24 hour   Intake 120 ml   Output --   Net 120 ml          PE:   General: No acute distress, resting comfortably in bed.  HEENT: NC/AT. PERRLA. EOMI. MMM  Cardiovascular: Normal S1/S2, RRR, no m/r/g  Respiratory: Symmetric inspiratory effort. Minimal breath sounds at bilateral bases with bilateral apical crackles and wheezes  Abdomen: BS+, soft, NT/ND   EXT:  BRICE, 5/5 strength, 2+ pulses, no rashes, bruising, or bleeding. Mild pitting edema   Neuro: Non focal with no numbness, tingling or changes in sensation    LABS:  Recent Labs     01/15/25  1838 01/16/25  0212 01/17/25  0039   WBC 17.4* 14.4* 11.2*   RBC 3.32* 3.17* 3.21*   HEMOGLOBIN 9.2* 9.0* 9.0*   HEMATOCRIT 30.2* 28.3* 28.5*   MCV 91.0 89.3 88.8   MCH 27.7 28.4 28.0   RDW 58.4* 55.7* 56.4*   PLATELETCT 218 221 224   MPV 10.8 11.5 11.3   NEUTSPOLYS 87.00* 88.60*  --    LYMPHOCYTES 7.10* 6.40*  --    MONOCYTES 4.90 3.90  --    EOSINOPHILS  0.20 0.30  --    BASOPHILS 0.20 0.20  --      Recent Labs     01/15/25  1838 01/16/25  0212 01/17/25  0039   SODIUM 131* 133* 136   POTASSIUM 5.2 4.8 4.3   CHLORIDE 93* 97 99   CO2 22 24 27   BUN 52* 46* 28*   CREATININE 1.09 0.82 0.65   CALCIUM 9.5 9.6 9.4   MAGNESIUM 2.3  --   --    ALBUMIN 3.7 3.8 3.4     Estimated GFR/CRCL = Estimated Creatinine Clearance: 93.6 mL/min (by C-G formula based on SCr of 0.65 mg/dL).  Recent Labs     01/15/25  1838 01/16/25  0212 01/17/25  0039   GLUCOSE 438* 349* 217*     Recent Labs     01/15/25  1838 01/16/25  0212 01/17/25  0039   ASTSGOT 69* 50* 27   ALTSGPT 116* 109* 85*   TBILIRUBIN 0.2 0.2 0.2   ALKPHOSPHAT 147* 141* 133*   GLOBULIN 3.0 3.0 3.0   INR 1.31*  --   --              Recent Labs     01/15/25  1838   INR 1.31*   APTT 34.4       MICROBIOLOGY:   Results       Procedure Component Value Units Date/Time    CoV-2, Flu A/B, And RSV by PCR ([facility]) [[id number]] Collected: 01/15/25 2213    Order Status: Completed Specimen: Respirate from Nasopharyngeal Updated: 01/16/25 0011     Influenza virus A RNA Negative     Influenza virus B, PCR Negative     RSV, PCR Negative     SARS-CoV-2 by PCR NotDetected     Comment: [facility] providers: PLEASE REFER TO DE-ESCALATION AND RETESTING PROTOCOL  on [email or web address]    **The [facility] GeneXpert Xpress SARS-CoV-2 RT-PCR Test has been made  available for use under the Emergency Use Authorization (EUA) only.          SARS-CoV-2 Source NP Swab             IMAGING:   DX-ESOPHAGUS - UOUR-[id number]-NA   Final Result      US-RUQ   Final Result         1.  Gallbladder wall thickening with edema, without visualized shadowing stones, consider acalculous cholecystitis. Could be further evaluated with HIDA scan as clinically appropriate   2.  Echogenic lesion near the hepatic dome, appearance suggesting hemangioma, otherwise indeterminate, could be followed up with three-phase CT liver for further characterization.   3.  Dilatation the right renal pelvis,  consider ex renal pelvis morphology or mild hydronephrosis   4.  Hepatomegaly   5.  Echogenic liver, compatible with fatty change versus fibrosis.      CT-CHEST,ABDOMEN,PELVIS WITH   Final Result         1.  Scattered bilateral pulmonary infiltrates   2.  Small right and trace left pleural effusions.   3.  Gallbladder wall thickening with possible pericholecystic fluid, could be further evaluated with right upper quadrant sonogram   4.  Right hepatic lobe low-density lesion, indeterminate, could be followed up with three-phase CT of the liver for further characterization.   5.  Mild mediastinal adenopathy, consider causes of adenopathy with additional workup as clinically appropriate   6.  Atherosclerosis and atherosclerotic coronary artery disease      DX-CHEST-PORTABLE (1 VIEW)   Final Result      1. Small bilateral pleural effusions and bibasal parenchymal consolidations, greater on the right than the left. Differential diagnosis includes atelectasis and/or pneumonia, with pulmonary edema favored less likely.   2. Atherosclerosis.          MEDS:  Current Facility-Administered Medications   Medication Last Admin    apixaban (Eliquis) tablet 5 mg 5 mg at 01/17/25 0510    atorvastatin (Lipitor) tablet 40 mg 40 mg at 01/17/25 0510    [Held by provider] carvedilol (Coreg) tablet 3.125 mg      [Held by provider] losartan (Cozaar) tablet 25 mg      [Held by provider] spironolactone (Aldactone) tablet 25 mg      sertraline (Zoloft) tablet 25 mg 25 mg at 01/17/25 0510    doxycycline (Vibramycin) 100 mg in  mL IVPB Stopped at 01/17/25 0606    insulin GLARGINE (Lantus,Semglee) injection 13 Units at 01/16/25 1756    And    insulin lispro (HumaLOG,AdmeLOG) subcutaneous injection 2 Units at 01/16/25 2342    And    dextrose 50% (D50W) injection 25 g      senna-docusate (Pericolace Or Senokot S) 8.6-50 MG per tablet 2 Tablet      And    polyethylene glycol/lytes (Miralax) Packet 1 Packet      dextrose 50% (D50W) injection  25 g      cefTRIAXone (Rocephin) syringe 1,000 mg 1,000 mg at 01/17/25 0511    diphenhydrAMINE (Benadryl) injection 25 mg         ASSESSMENT/PLAN: Rogelio Padilla is a 74 y.o. male   * Hypoxia- (present on admission)  Assessment & Plan  #pleural effusion  #Pulmonary infiltrates   #leukocytosis  Choking event while eating dinner on 1/15/2025.  Recently became adentulous.  High risk for aspiration.  No O2 demand at baseline, hovering between 3 to 5 L while inpatient.  Significant desats into the 80s-70s while eating meals.  Recently recovering from viral illness and given his leukocytosis and CT chest demonstrating pulmonary infiltrates and pleural effusion there is concern for post viral pneumonia process. Faint inspiratory wheezing and faint crackles at the bases with diminished breath sounds throughout on exam.  Given his poor glycemic control, will treat for pneumonia. Penicillin allergy so we will go with ceftriaxone and doxycycline.  With mentioning as patient has had an incidental finding of possible cholecystic fluid and gallbladder thickening ceftriaxone may contributed to formation of biliary sludge which should continue to be monitored.    Continue Ceftriaxone and doxycycline   Titrate oxygen as tolerated   CBC daily   If worsening oxygen demand repeat chest xray and labs         Depression  Assessment & Plan  History of depression on zoloft 25mg which will be continued inpatient.     Thickening of wall of gallbladder with pericholecystic fluid- (present on admission)  Assessment & Plan  Incidental finding on CT chest, abdomen, pelvis.  LFTs mildly elevated as is alkaline phosphatase as AST/ALT 69/116.  Alkaline phosphatase 147.  Bilirubin within normal limits.  Exam without tenderness in the right upper quadrant.  Patient is currently on ceftriaxone which can impact and contribute to the formation of biliary sludge. RUQ ultrasound completed with no gallstones noted but wall thickening and possible  hemangioma. Patient remains asymptomatic.    Continue to monitor   Daily CMP   Referral to GI outpatient     Anemia- (present on admission)  Assessment & Plan  Asymptomatic and incidental finding.  MCV within normal limits.  Continue to monitor.    At high risk for aspiration- (present on admission)  Assessment & Plan  Patient presenting following choking on food leading to syncopal episode and hypoxia.  Speech eval and swallow study completed 1/16/25 with recommendations of minced soft foods and therapies.  Patient continued to desat while eating.  SLP reevaluated and has downgraded patient to puréed diet and thin liquids.    Puréed diet with thin liquids  Continue to follow speech recommendations   Low threshold for aspiration pneumonia so will monitor closely     Type 2 diabetes mellitus, with long-term current use of insulin (HCC)- (present on admission)  Assessment & Plan  Patient with poorly controlled, A1c in September 2024 was 9.3.  Patient taking Lantus 13 units nightly, no other antiglycemic medications as they were discontinued when he was having hypotensive episodes.  Blood sugar markedly elevated on presentation at 438.  We will update an A1c of 11.1%.  Blood sugars steadily improving while inpatient.    Glargine 13 U   Adjust if patient becomes NPO again to lower dose   Adjust per daily glucose trends   Sliding scale, low correctional   POCT glucose   Hypoglycemic protocol       Pleural effusion- (present on admission)  Assessment & Plan  Please see assessment and plan under hypoxia    Chronic systolic congestive heart failure (HCC)- (present on admission)  Assessment & Plan  Chronic, diagnosed in [place] prior to moving to [place] late in 2024.  He has an echo in October 24 with no comparison available show an ejection fraction of 55 to 60%.  He follows with cardiology at [facility] who notes systolic congestive heart failure.  He is on Coreg 3.125 mg twice daily, losartan 25 mg, spironolactone 25 mg per  chart review but is not taking these; no SGLT2 inhibitor but this could be a good addition given his poorly controlled diabetes. His GDMT medications have been stopped by cardiology due to low blood pressures.  Evidence of mild fluid overload on exam with 1+ pitting edema to the mid shins.  Also has pleural effusions which could be due to heart failure versus pulmonary infection versus flash pulmonary edema after choking event.    Monitor for need for lasix   Continue to hold coreg 3.125 mg, losartan 25 mg and spironolactone 25 mg per notes with cardiology, he has follow-up on 1/23/25 and can discuss then whether or not to restart and consider SGLT2 inhibitor   Strict I/O's   2L fluid restriction     Dyslipidemia- (present on admission)  Assessment & Plan  Chronic.  On atorvastatin 40 mg daily which will be continued while in the hospital    Primary hypertension- (present on admission)  Assessment & Plan  Chronic.  Stable.  Blood pressures on admission ranging 126-140/64-87.  He is receiving Lasix for his pleural effusion and pulmonary edema.  Also has co-occurring heart failure for which he receives carvedilol and spironolactone but has not been taking this since having hypotensive episodes.  Continue to hold his losartan 25 mg daily and continue to monitor his blood pressure while patient      Monitor blood pressure     Paroxysmal atrial fibrillation (HCC)- (present on admission)  Assessment & Plan  Chronic.  Was not in A-fib on admission.  Is on apixaban 5 mg twice daily which will be continued while in the hospital.      Continue apixaban 5mg BID           Core Measures:  Fluids: None   Lines: PIV   Abx: C3 and doxycycline   Diet: Puréed and thin liquids  PPX: Apixaban   DISPO: Inpatient management for IV abx and oxygen supplementation. Patient is not cleared for discharge       CODE STATUS: FULL     Socrates Griffin M.D.   PGY1  [facility]

## 2025-01-17 NOTE — CARE PLAN
The patient is Stable - Low risk of patient condition declining or worsening    Shift Goals  Clinical Goals: improve oxygenation; improve oral intake  Patient Goals: eat  Family Goals: VALENTINA no family member present    Progress made toward(s) clinical / shift goals:      Problem: Knowledge Deficit - Standard  Goal: Patient and family/care givers will demonstrate understanding of plan of care, disease process/condition, diagnostic tests and medications  Outcome: Progressing     Problem: Dysphagia  Goal: Dysphagia will improve  Outcome: Progressing       Patient is not progressing towards the following goals:

## 2025-01-17 NOTE — THERAPY
"Occupational Therapy   Initial Evaluation     Patient Name: Rogelio Padilla  Age:  74 y.o., Sex:  male  Medical Record #: [id number]  Today's Date: 1/17/2025     Precautions  Precautions: Fall Risk, Swallow Precautions  Comments: desats with mobility    Assessment    Patient is 74 y.o. male admitted after choking incident and fall. PMHx of paroxysmal atrial fibrillation, hypertension, hyperlipidemia, diabetes, systolic congestive heart failure, and a recently diagnosed prostate lesion. Pt normally independent with functional mobility and ADLs living in a 2 story house with family, stays on 1st floor. Pt able to complete bed mobility, transfers, LB ADLs with CGA. Will continue to see for skilled therapy and recommend home health at baseline.       Plan    Occupational Therapy Initial Treatment Plan   Treatment Interventions: (P) Self Care / Activities of Daily Living, Adaptive Equipment, Community Re-Integration, Manual Therapy Techniques, Neuro Re-Education / Balance, Therapeutic Exercises, Therapeutic Activity  Treatment Frequency: (P) 3 Times per Week  Duration: (P) Until Therapy Goals Met    DC Equipment Recommendations: (P) None  Discharge Recommendations: (P) Recommend home health for continued occupational therapy services     Subjective    \"They told me I can't eat something about my swallowing\"     Objective       01/17/25 1027   Prior Living Situation   Prior Services None   Housing / Facility 2 Story House   Steps Into Home 1   Rail None   Elevator No   Bathroom Set up Bathtub / Shower Combination   Equipment Owned 4-Wheel Walker;Single Point Cane   Lives with - Patient's Self Care Capacity Adult Children   Comments family able to assist as needed   Prior Level of ADL Function   Self Feeding Independent   Grooming / Hygiene Independent   Bathing Independent   Dressing Independent   Toileting Independent   Prior Level of IADL Function   Medication Management Independent   Laundry Independent   Kitchen " Mobility Independent   Finances Independent   Home Management Independent   Shopping Independent   Prior Level Of Mobility Independent With Device in Community;Independent With Device in Home   Driving / Transportation Relatives / Others Provide Transportation   History of Falls   History of Falls Yes   Date of Last Fall   (reason for admission)   Precautions   Precautions Fall Risk;Swallow Precautions   Pain 0 - 10 Group   Therapist Pain Assessment Post Activity Pain Same as Prior to Activity;Nurse Notified   Cognition    Cognition / Consciousness X   Speech/ Communication Hard of Hearing   Comments pleasant, cooperative   Active ROM Upper Body   Active ROM Upper Body  WDL   Strength Upper Body   Upper Body Strength  WDL   Sensation Upper Body   Upper Extremity Sensation  WDL   Balance Assessment   Sitting Balance (Static) Fair +   Sitting Balance (Dynamic) Fair   Standing Balance (Static) Fair -   Standing Balance (Dynamic) Fair -   Weight Shift Sitting Fair   Weight Shift Standing Fair   Bed Mobility    Supine to Sit Supervised   Scooting Supervised   Rolling Supervised   ADL Assessment   Grooming Supervision;Seated   Upper Body Dressing Supervision   Lower Body Dressing Contact Guard Assist   How much help from another person does the patient currently need...   Putting on and taking off regular lower body clothing? 3   Bathing (including washing, rinsing, and drying)? 3   Toileting, which includes using a toilet, bedpan, or urinal? 3   Putting on and taking off regular upper body clothing? 3   Taking care of personal grooming such as brushing teeth? 3   Eating meals? 3   6 Clicks Daily Activity Score 18   Functional Mobility   Sit to Stand Contact Guard Assist   Bed, Chair, Wheelchair Transfer Contact Guard Assist   Transfer Method Stand Step   Mobility bed mobility, transfer to chair   Comments w/ HHA   Activity Tolerance   Sitting in Chair left seated in chair   Sitting Edge of Bed 5 min   Standing 3 min    Short Term Goals   Short Term Goal # 1 Pt will complete ADL transfers with supervision   Short Term Goal # 2 Pt will complete LB dressing with supervision   Short Term Goal # 3 Pt will complete toileting with supervision   Education Group   Education Provided Role of Occupational Therapist   Role of Occupational Therapist Patient Response Patient;Acceptance;Explanation   Occupational Therapy Initial Treatment Plan    Treatment Interventions Self Care / Activities of Daily Living;Adaptive Equipment;Community Re-Integration;Manual Therapy Techniques;Neuro Re-Education / Balance;Therapeutic Exercises;Therapeutic Activity   Treatment Frequency 3 Times per Week   Duration Until Therapy Goals Met   Problem List   Problem List Decreased Active Daily Living Skills;Decreased Homemaking Skills;Decreased Functional Mobility;Decreased Activity Tolerance;Impaired Postural Control / Balance   Anticipated Discharge Equipment and Recommendations   DC Equipment Recommendations None   Discharge Recommendations Recommend home health for continued occupational therapy services   Interdisciplinary Plan of Care Collaboration   IDT Collaboration with  Nursing   Patient Position at End of Therapy Seated;Chair Alarm On;Call Light within Reach;Tray Table within Reach;Phone within Reach   Collaboration Comments RN updated

## 2025-01-17 NOTE — THERAPY
Speech Language Pathology   Daily Treatment     Patient Name: Rogelio Padilla  AGE:  74 y.o., SEX:  male  Medical Record #: [id number]  Date of Service: 1/17/2025      Precautions:  Precautions: Fall Risk, Swallow Precautions         Subjective  RN reported pt with coughing during bite of minced&moist solids from breakfast tray this morning; therefore RN holding PO until seen by SLP. Patient received awake, sitting upright in chair; endorsed some difficulty with solids. Of note, pt's SpO2 varying throughout session with moments of desaturation to 86% with quick return to 96%. RN aware.      Assessment  PO of thin liquids and puree presented. Pt adequately self-feeding. Adequate oral bolus clearance. No overt s/sx of airway invasion. Discussed diet downgrade to puree solids; pt stated understanding and agreement. Reviewed recommend swallow strategies; pt demo'd understanding via teach back.       Clinical Impressions  Per MBSS completed 1/16, patient presented with a mild-moderate oropharyngeal dysphagia. Pt unable to tolerate MM5/TN0 2/2 significant oral dysphagia in setting of edentulism. Thus, recommend diet downgrade to puree solids and thin liquids with adherence to recommended strategies. SLP to continue to follow to ensure diet tolerance and monitor for changes.       Recommendations  Diet Consistency: Puree solids/thin liquids  Instrumentation: None indicated at this time  Medication: Whole with puree, Crush with applesauce, as appropriate, Crush with pudding/puree, as appropriate  Supervision: Close supervision - patient may be left alone for less than 5 minutes at a time  Positioning: Fully upright and midline during oral intake, Remain upright for 30-45 minutes after oral intake, Meals sitting upright in a chair, as tolerated  Risk Management : Small bites/sips, Slow rate of intake, Alternate bites and sips (Multiple swallows (2-3) per bite/sips)  Oral Care: Q4h        SLP Treatment Plan  Treatment  "Plan: Dysphagia Treatment  SLP Frequency: 3x Per Week  Estimated Duration: Until Therapy Goals Met      Anticipated Discharge Needs  Discharge Recommendations: Recommend outpatient speech therapy services  Therapy Recommendations Upon DC: Dysphagia Training, Patient / Family / Caregiver Education      Patient / Family Goals  Patient / Family Goal #1: \"Can I have some breakfast?\"  Goal #1 Outcome: Progressing as expected  Short Term Goals  Short Term Goal # 1: Pt will complete a diagnostic swallow study to further assess swallow function and inform POC  Goal Outcome # 1: Goal met, new goal added  Short Term Goal # 1 B : Pt will consume an oral diet of  minced&moist solids/thin liquids without overt s/sx of aspiration or decline in respiratory status  Goal Outcome  # 1 B: Goal not met  Short Term Goal # 2: Pt will completed exercises targeting BOT retraction, epiglottic inversion, LVC, and pharyngeal constriction/shortening x50 in a session given modA  Goal Outcome # 2 :  (goal not targeted)  Short Term Goal # 3: Pt will consume an oral diet of puree solids/thin liquids without overt s/sx of aspiration or decline in respiratory status      Lupillo Whitman, ALEJANDRA  "

## 2025-01-17 NOTE — THERAPY
"Speech Language Pathology   Cognitive Evaluation     Patient Name: Rogelio Padilla  AGE:  74 y.o., SEX:  male  Medical Record #: [id number]  Date of Service: 1/17/2025      History of Present Illness  Patient is a 74 y.o. male who presented following choking on his dinner. The patient was found down with a reported pulse ox of 45%.  He was initially unresponsive and required oxygen supplementation.     PMHx: paroxysmal atrial fibrillation, hypertension, hyperlipidemia, diabetes, systolic congestive heart failure, and a recently diagnosed prostate lesion with urology follow-up     CXR:   1. Small bilateral pleural effusions and bibasal parenchymal consolidations, greater on the right than the left. Differential diagnosis includes atelectasis and/or pneumonia, with pulmonary edema favored less likely.  2. Atherosclerosis.      General Information  Vitals  O2 (LPM): 4  O2 Delivery Device: Silicone Nasal Cannula  Level of Consciousness: Alert, Awake  Orientation: Oriented x 4  Follows Directives: Yes      Prior Living Situation & Level of Function  Housing / Facility: [address] [facility]  Lives with - Patient's Self Care Capacity:  (daughter and RADHA, 2 grandchildren, and dtrs in laws)  Comments: family able to assist as needed  Communication: WFL  Swallowing: Hx of \"choking incidents\" that required pt to present to the ER. Pt reported esophageal dilation about 1 month ago       Oral Mechanism Evaluation  Motor Speech: Dysarthric      Laryngeal Function Exam  Voice Quality: Harsh  Cough: Perceptually WNL      Subjective  Patient denied noticing any acute cognitive changes. Endorsed voice sounds \"more harsh and scratchy.\" Pt very pleasant and cooperative with SLP evaluation.        Assessment  The patient was seen this date for a cognitive-linguistic evaluation. Portions of the COGNISTAT (The Neurobehavioral Cognitive Status Examination), as well as non-standardized assessments were utilized. Results are as " follows:      Cognistat  Orientation: Average  Attention: Average  Comprehension: Average  Repetition: Average  Naming: Average  Memory: Average  Calculations: Average  Similarities: Average  Judgement: Average      Medication Management  Patient endorsed taking daily medications; utilized pill box for organization. Pt reported daughter assists with managing medications and placing pills in box. Pt unable to read written questions 2/2 readers not available. Pt answered presented questions re: medication management with 100% accuracy.    Comments:   Patient with fluent yet dysarthric speech. Pt attended to all structured tasks without redirection required. Timely response time. Pt endorsed performance on assessment is consistent with baseline. Reported family can assist with iADLS as needed.       Clinical Impressions  Per results of formal and informal cognitive evaluations, the patient presents with cognition consistent with or approaching their baseline level of functioning. Therefore, no further acute speech pathology services are indicated at this time.  Discussed with patient that, should higher level deficits impact the patient's ability to resume premorbid activities, recommend outpatient speech therapy follow up. Patient verbalized understanding and agreement.       NOTE: It is not within the scope of practice of Speech-Language Pathologists to determine patient capacity. Please defer to the physician or psych to complete this assessment.       Recommendations  Supervision Needs Upons Discharge: None  IADLs: N/A         SLP Treatment Plan  Treatment Plan: Dysphagia Treatment  SLP Frequency: 3x Per Week  Estimated Duration: Until Therapy Goals Met      Anticipated Discharge Needs  Discharge Recommendations: Recommend outpatient speech therapy services  Therapy Recommendations Upon DC: Dysphagia Training, Patient / Family / Caregiver Education, Motor Speech      Patient / Family Goals  Patient / Family Goal  "#1: \"Can I have some breakfast?\"  Goal #1 Outcome: Progressing as expected  Short Term Goal # 1: Pt will complete a diagnostic swallow study to further assess swallow function and inform POC  Goal Outcome # 1: Goal met, new goal added  Short Term Goal # 1 B : Pt will consume an oral diet of  minced&moist solids/thin liquids without overt s/sx of aspiration or decline in respiratory status  Goal Outcome  # 1 B: Goal not met  Short Term Goal # 2: Pt will completed exercises targeting BOT retraction, epiglottic inversion, LVC, and pharyngeal constriction/shortening x50 in a session given modA  Goal Outcome # 2 :  (goal not targeted)  Short Term Goal # 3: Pt will consume an oral diet of puree solids/thin liquids without overt s/sx of aspiration or decline in respiratory status      Lupillo Whitman, ALEJANDRA  "

## 2025-01-17 NOTE — THERAPY
"Physical Therapy   Initial Evaluation     Patient Name: Rogelio Padilla  Age:  74 y.o., Sex:  male  Medical Record #: [id number]  Today's Date: 1/17/2025     Precautions  Precautions: Fall Risk;Swallow Precautions  Comments: desats with mobility    Assessment  Patient is 74 y.o. male admitted after choking incident and fall. PMHx of paroxysmal atrial fibrillation, hypertension, hyperlipidemia, diabetes, systolic congestive heart failure, and a recently diagnosed prostate lesion. Pt now requiring 4L supplemental O2, is on RA at baseline. Pt required CGA for standing, transfers, and ambulation with no AD. Ambulation limited 2/2 desaturating to 83%, quickly recovered to > 88% (see vitals below for details). Anticipate pt will progress to home with HHPT, pt agrees. Pt would benefit from continued acute IP PT services to address said deficits.     Plan    Physical Therapy Initial Treatment Plan   Treatment Plan : Bed Mobility, Equipment, Family / Caregiver Training, Gait Training, Manual Therapy, Neuro Re-Education / Balance, Self Care / Home Evaluation, Stair Training, Therapeutic Activities, Therapeutic Exercise  Treatment Frequency: 3 Times per Week  Duration: Until Therapy Goals Met    DC Equipment Recommendations: None  Discharge Recommendations: Recommend home health for continued physical therapy services       Subjective    \"I really don't think I need a walker.\" When offered for mobility     Objective     01/17/25 1242   Vitals   O2 (LPM) 4 - new O2 needs, on RA at baseline   O2 Delivery Device Silicone Nasal Cannula  (discussed with RN, pt may be mouth breather with frequent random desaturations)   Vitals Comments Pt desat to 83% on 4L with short distance ambulation. Returned to >93% within 1-2 min seated rest. Then pt performing IS x10, few minutes later pt suddenly desaturating to high 60's with good waveform. Instructed pursed lip breathing, returned to 95% within 30 seconds, RN made aware   Pain 0 - 10 " Group   Therapist Pain Assessment Post Activity Pain Same as Prior to Activity;Nurse Notified;0   Prior Living Situation   Housing / Facility 2 Story House  (stays on 1st floor)   Steps Into Home 1   Rail None   Equipment Owned 4-Wheel Walker;Single Point Cane   Lives with - Patient's Self Care Capacity   (daughter and RADHA, 2 grandchildren, and dtrs in laws)   Comments family able to assist as needed   Prior Level of Functional Mobility   Bed Mobility Independent   Transfer Status Independent   Ambulation Independent   Ambulation Distance   (limited community)   Assistive Devices Used   (4WW in home, SPC vs shopping cart in community)   Stairs Independent   History of Falls   Date of Last Fall   (reason for admit)   Cognition    Cognition / Consciousness X   Speech/ Communication Hard of Hearing   Comments Pleasant and cooperative   Strength Upper Body   Upper Body Strength  WDL   Comments grossly with mobility   Active ROM Lower Body    Active ROM Lower Body  WDL   Strength Lower Body   Comments BLE grossly 4/5   Balance Assessment   Sitting Balance (Static) Fair +   Sitting Balance (Dynamic) Fair   Standing Balance (Static) Fair -   Standing Balance (Dynamic) Fair -   Weight Shift Sitting Fair   Weight Shift Standing Fair   Comments no AD, declined. 1 mild LOB with initially standing, able to self correct   Bed Mobility    Comments NT, in chair pre/post   Gait Analysis   Gait Level Of Assist Contact Guard Assist   Assistive Device None   Distance (Feet) 50   # of Times Distance was Traveled 1   Deviation Shuffled Gait   Comments limited 2/2 pt desaturating, see vitals above   Functional Mobility   Sit to Stand Contact Guard Assist   Bed, Chair, Wheelchair Transfer Contact Guard Assist   Transfer Method Stand Step   Mobility no AD in room, up to chair   6 Clicks Assessment - How much HELP from from another person do you currently need... (If the patient hasn't done an activity recently, how much help from another  person do you think he/she would need if he/she tried?)   Turning from your back to your side while in a flat bed without using bedrails? 4   Moving from lying on your back to sitting on the side of a flat bed without using bedrails? 3   Moving to and from a bed to a chair (including a wheelchair)? 3   Standing up from a chair using your arms (e.g., wheelchair, or bedside chair)? 3   Walking in hospital room? 3   Climbing 3-5 steps with a railing? 3   6 clicks Mobility Score 19   Activity Tolerance   Sitting in Chair up pre/post session   Standing 3 min   Short Term Goals    Short Term Goal # 1 Pt will perform supine <> sit with SPV in 6 visits to get in/out of bed   Short Term Goal # 2 Pt will perform stand step transfers with LRAD and SPV in 6 visits to get in/out of chair   Short Term Goal # 3 Pt will ambulate 150ft with LRAD and SPV in 6 visits to access home environment   Short Term Goal # 4 Pt will ascend/descend 1 steps with unilateral UE support and SBA in 6 visits to safely enter/exit home   Education Group   Education Provided Role of Physical Therapist   Role of Physical Therapist Patient Response Patient;Acceptance;Explanation;Verbal Demonstration   Additional Comments Pt receptive to self management and compensatory strategies with mobility; Use of IS- good demo x10   Physical Therapy Initial Treatment Plan    Treatment Plan  Bed Mobility;Equipment;Family / Caregiver Training;Gait Training;Manual Therapy;Neuro Re-Education / Balance;Self Care / Home Evaluation;Stair Training;Therapeutic Activities;Therapeutic Exercise   Treatment Frequency 3 Times per Week   Duration Until Therapy Goals Met   Problem List    Problems Impaired Bed Mobility;Impaired Transfers;Impaired Ambulation;Functional Strength Deficit;Impaired Balance;Decreased Activity Tolerance;Safety Awareness Deficits / Cognition   Anticipated Discharge Equipment and Recommendations   DC Equipment Recommendations None   Discharge Recommendations  Recommend home health for continued physical therapy services   Interdisciplinary Plan of Care Collaboration   IDT Collaboration with  Nursing   Patient Position at End of Therapy Seated;Chair Alarm On;Bed Alarm On;Call Light within Reach;Tray Table within Reach;Phone within Reach   Collaboration Comments RN updated   Session Information   Date / Session Number  1/17- 1 (1/3, 1/23)

## 2025-01-17 NOTE — CARE PLAN
The patient is Stable - Low risk of patient condition declining or worsening    Shift Goals  Clinical Goals: Aspiration precautions. improve oxygenation during the night shift  Patient Goals: Rest and comfort during the night shift  Family Goals: VALENTINA no family member present    Progress made toward(s) clinical / shift goals:  on going    Patient is   Problem: Dysphagia  Goal: Dysphagia will improve  Description: Target End Date:  Prior to discharge or change in level of care    1.  Assess and monitor ability to swallow  2.  Collaborate with Speech Therapy to determine appropriate adaptation for safe administration of medications and oral nutrition  3.  Elevate head of bed to 90 degrees during feedings and for 30 minutes after each feeding  4.  Encourage proper swallowing techniques  5.  Screening on admission or as soon as possible  Outcome: Progressing     Problem: Nutrition  Goal: Patient's nutritional and fluid intake will be adequate or improve  Description: Target End Date:  Prior to discharge or change in level of care    Document on I/O flowsheet    1.  Monitor nutritional intake  2.  Monitor weight per provider order  3.  Assess patient's ability to take oral nutrition  4.  Collaborate with Speech Therapy, Dietitian and interdisciplinary team for appropriate feeding and fluid intake  5.  Assist with feeding  Outcome: Progressing     Problem: Discharge Barriers/Planning  Goal: Patient's continuum of care needs are met  Description: Target End Date:  Prior to discharge or change in level of care    1.  Identify potential discharge barriers on admission and throughout hospitalization  2.  Collaborate with Case Management, , Clinical Educators, Navigators and others on the transitional care team to meet discharge needs  3.  Involve patient/family/caregivers in setting and prioritizing goals for hospitalization and discharge  4.  Ensure Flu vaccinations are addressed  5.  Inquire if patient is  interested in the Meds to Bed program  6.  Ensure patient and family/caregiver are able to demonstrate use of equipment as prescribed  7.  Ensure patient and family/caregiver can verbalize understanding of patient education  8.  Explain discharge instructions and medication reconciliation to patient and family/caregiver  Outcome: Progressing     Problem: Knowledge Deficit - Standard  Goal: Patient and family/care givers will demonstrate understanding of plan of care, disease process/condition, diagnostic tests and medications  Description: Target End Date:  1-3 days or as soon as patient condition allows    Document in Patient Education    1.  Patient and family/caregiver oriented to unit, equipment, visitation policy and means for communicating concern  2.  Complete/review Learning Assessment  3.  Assess knowledge level of disease process/condition, treatment plan, diagnostic tests and medications  4.  Explain disease process/condition, treatment plan, diagnostic tests and medications  Outcome: Progressing     Problem: Self Care  Goal: Patient will have the ability to perform ADLs independently or with assistance (bathe, groom, dress, toilet and feed)  Description: Target End Date:  Prior to discharge or change in level of care    Document on ADL flowsheet    1.  Assess the capability and level of deficiency to perform ADLs  2.  Encourage family/care giver involvement  3.  Provide assistive devices  4.  Consider PT/OT evaluations  5.  Maintain support, give positive feedback, encourage self-care allowing extra time and verbal cuing as needed  6.  Avoid doing something for patients they can do themselves, but provide assistance as needed  7.  Assist in anticipating/planning individual needs  8.  Collaborate with Case Management and  to meet discharge needs  Outcome: Progressing    progressing towards the following goals:

## 2025-01-18 LAB
ALBUMIN SERPL BCP-MCNC: 3.2 G/DL (ref 3.2–4.9)
ALBUMIN/GLOB SERPL: 1.1 G/DL
ALP SERPL-CCNC: 121 U/L (ref 30–99)
ALT SERPL-CCNC: 63 U/L (ref 2–50)
ANION GAP SERPL CALC-SCNC: 10 MMOL/L (ref 7–16)
AST SERPL-CCNC: 23 U/L (ref 12–45)
BILIRUB SERPL-MCNC: 0.2 MG/DL (ref 0.1–1.5)
BUN SERPL-MCNC: 21 MG/DL (ref 8–22)
CALCIUM ALBUM COR SERPL-MCNC: 9.9 MG/DL (ref 8.5–10.5)
CALCIUM SERPL-MCNC: 9.3 MG/DL (ref 8.5–10.5)
CHLORIDE SERPL-SCNC: 100 MMOL/L (ref 96–112)
CO2 SERPL-SCNC: 27 MMOL/L (ref 20–33)
CREAT SERPL-MCNC: 0.57 MG/DL (ref 0.5–1.4)
ERYTHROCYTE [DISTWIDTH] IN BLOOD BY AUTOMATED COUNT: 57.6 FL (ref 35.9–50)
GFR SERPLBLD CREATININE-BSD FMLA CKD-EPI: 104 ML/MIN/1.73 M 2
GLOBULIN SER CALC-MCNC: 3 G/DL (ref 1.9–3.5)
GLUCOSE BLD STRIP.AUTO-MCNC: 136 MG/DL (ref 65–99)
GLUCOSE BLD STRIP.AUTO-MCNC: 194 MG/DL (ref 65–99)
GLUCOSE BLD STRIP.AUTO-MCNC: 242 MG/DL (ref 65–99)
GLUCOSE BLD STRIP.AUTO-MCNC: 96 MG/DL (ref 65–99)
GLUCOSE SERPL-MCNC: 154 MG/DL (ref 65–99)
HCT VFR BLD AUTO: 30.9 % (ref 42–52)
HGB BLD-MCNC: 9.5 G/DL (ref 14–18)
MCH RBC QN AUTO: 27.5 PG (ref 27–33)
MCHC RBC AUTO-ENTMCNC: 30.7 G/DL (ref 32.3–36.5)
MCV RBC AUTO: 89.3 FL (ref 81.4–97.8)
PLATELET # BLD AUTO: 248 K/UL (ref 164–446)
PMV BLD AUTO: 11.2 FL (ref 9–12.9)
POTASSIUM SERPL-SCNC: 4 MMOL/L (ref 3.6–5.5)
PROT SERPL-MCNC: 6.2 G/DL (ref 6–8.2)
RBC # BLD AUTO: 3.46 M/UL (ref 4.7–6.1)
SODIUM SERPL-SCNC: 137 MMOL/L (ref 135–145)
WBC # BLD AUTO: 8.8 K/UL (ref 4.8–10.8)

## 2025-01-18 PROCEDURE — A9270 NON-COVERED ITEM OR SERVICE: HCPCS

## 2025-01-18 PROCEDURE — 80053 COMPREHEN METABOLIC PANEL: CPT

## 2025-01-18 PROCEDURE — 82962 GLUCOSE BLOOD TEST: CPT | Mod: 91

## 2025-01-18 PROCEDURE — 700102 HCHG RX REV CODE 250 W/ 637 OVERRIDE(OP)

## 2025-01-18 PROCEDURE — 85027 COMPLETE CBC AUTOMATED: CPT

## 2025-01-18 PROCEDURE — 770006 HCHG ROOM/CARE - MED/SURG/GYN SEMI*

## 2025-01-18 PROCEDURE — 36415 COLL VENOUS BLD VENIPUNCTURE: CPT

## 2025-01-18 PROCEDURE — 700111 HCHG RX REV CODE 636 W/ 250 OVERRIDE (IP)

## 2025-01-18 PROCEDURE — 99232 SBSQ HOSP IP/OBS MODERATE 35: CPT | Performed by: STUDENT IN AN ORGANIZED HEALTH CARE EDUCATION/TRAINING PROGRAM

## 2025-01-18 PROCEDURE — 700105 HCHG RX REV CODE 258

## 2025-01-18 RX ADMIN — CEFTRIAXONE SODIUM 1000 MG: 10 INJECTION, POWDER, FOR SOLUTION INTRAVENOUS at 04:58

## 2025-01-18 RX ADMIN — DOXYCYCLINE 100 MG: 100 TABLET, FILM COATED ORAL at 17:39

## 2025-01-18 RX ADMIN — APIXABAN 5 MG: 5 TABLET, FILM COATED ORAL at 17:39

## 2025-01-18 RX ADMIN — SERTRALINE HYDROCHLORIDE 25 MG: 50 TABLET ORAL at 04:57

## 2025-01-18 RX ADMIN — DOXYCYCLINE 100 MG: 100 TABLET, FILM COATED ORAL at 04:57

## 2025-01-18 RX ADMIN — INSULIN LISPRO 2 UNITS: 100 INJECTION, SOLUTION INTRAVENOUS; SUBCUTANEOUS at 17:35

## 2025-01-18 RX ADMIN — ATORVASTATIN CALCIUM 40 MG: 40 TABLET, FILM COATED ORAL at 04:57

## 2025-01-18 RX ADMIN — APIXABAN 5 MG: 5 TABLET, FILM COATED ORAL at 04:57

## 2025-01-18 RX ADMIN — INSULIN LISPRO 1 UNITS: 100 INJECTION, SOLUTION INTRAVENOUS; SUBCUTANEOUS at 12:10

## 2025-01-18 ASSESSMENT — PAIN DESCRIPTION - PAIN TYPE
TYPE: ACUTE PAIN
TYPE: ACUTE PAIN

## 2025-01-18 NOTE — PROGRESS NOTES
"  HOME O2 Saturation Measurements:(Values must be present for Home Oxygen orders)  Room air sat at rest: 80  Room air sat with amb:  (did not attempt)  With liters of O2: 5, O2 sat at rest with O2: 95  With Liters of O2: 5, O2 sat with amb with O2 : 91  Is the patient mobile?: Yes  If patient feels more short of breath, they can go up to 6 liters per minute and contact healthcare provider.    Supporting Symptoms: The patient requires supplemental oxygen, as the following interventions have been tried with limited or no improvement: \"Ambulation with oximetry and \"Incentive spirometry    "

## 2025-01-18 NOTE — PROGRESS NOTES
[facility] MEDICINE PROGRESS NOTE     Attending: Biju Bernard M.d.     Senior Resident: Daphney Ramos M.D.     Juventino Resident: Socrates Griffin M.D.     PATIENT: Rogelio Padilla; [id number]; 1950    ID: 74 y.o. male hospital day 3 admitted after a choking incident needing supplemental oxygen and possible pneumonia     SUBJECTIVE: Patient desatted to 78% over night and placed on 5L O2. Patient currently still on 5L of oxygen. He otherwise states he is doing well and has no acute complaints this morning. His diet was switched to pureed per SLP recommendation due to patient desatting while eating his meals. He states he is tolerating this a lot better and no longer desatting with meals. Denies fever or chills.    OBJECTIVE:     Vitals:    01/18/25 0800 01/18/25 0900 01/18/25 1000 01/18/25 1508   BP: 111/57   133/73   Pulse: 65   67   Resp: 18   18   Temp: 36.2 °C (97.2 °F)   36.4 °C (97.6 °F)   TempSrc: Temporal   Temporal   SpO2: 95% 92% 95% 94%   Weight:       Height:             Intake/Output Summary (Last 24 hours) at 1/18/2025 1633  Last data filed at 1/18/2025 1400  Gross per 24 hour   Intake 340 ml   Output --   Net 340 ml            PE:   General: No acute distress, resting comfortably in bed.  HEENT: NC/AT. PERRLA. EOMI. MMM  Cardiovascular: Normal S1/S2, RRR, no m/r/g  Respiratory: Symmetric inspiratory effort. Minimal breath sounds at bilateral bases. Bilateral apical crackles and wheezes improved from prior  Abdomen: BS+, soft, NT/ND   EXT:  BRICE, 5/5 strength, 2+ pulses, no rashes, bruising, or bleeding. Mild pitting edema   Neuro: Non focal with no numbness, tingling or changes in sensation    LABS:  Recent Labs     01/15/25  1838 01/16/25  0212 01/17/25  0039 01/18/25  0122   WBC 17.4* 14.4* 11.2* 8.8   RBC 3.32* 3.17* 3.21* 3.46*   HEMOGLOBIN 9.2* 9.0* 9.0* 9.5*   HEMATOCRIT 30.2* 28.3* 28.5* 30.9*   MCV 91.0 89.3 88.8 89.3   MCH 27.7 28.4 28.0 27.5   RDW 58.4* 55.7* 56.4* 57.6*   PLATELETCT  218 221 224 248   MPV 10.8 11.5 11.3 11.2   NEUTSPOLYS 87.00* 88.60*  --   --    LYMPHOCYTES 7.10* 6.40*  --   --    MONOCYTES 4.90 3.90  --   --    EOSINOPHILS 0.20 0.30  --   --    BASOPHILS 0.20 0.20  --   --      Recent Labs     01/15/25  1838 01/16/25  0212 01/17/25 0039 01/18/25  0122   SODIUM 131* 133* 136 137   POTASSIUM 5.2 4.8 4.3 4.0   CHLORIDE 93* 97 99 100   CO2 22 24 27 27   BUN 52* 46* 28* 21   CREATININE 1.09 0.82 0.65 0.57   CALCIUM 9.5 9.6 9.4 9.3   MAGNESIUM 2.3  --   --   --    ALBUMIN 3.7 3.8 3.4 3.2     Estimated GFR/CRCL = Estimated Creatinine Clearance: 106.7 mL/min (by C-G formula based on SCr of 0.57 mg/dL).  Recent Labs     01/16/25 0212 01/17/25 0039 01/18/25  0122   GLUCOSE 349* 217* 154*     Recent Labs     01/15/25  1838 01/16/25  0212 01/17/25  0039 01/18/25  0122   ASTSGOT 69* 50* 27 23   ALTSGPT 116* 109* 85* 63*   TBILIRUBIN 0.2 0.2 0.2 0.2   ALKPHOSPHAT 147* 141* 133* 121*   GLOBULIN 3.0 3.0 3.0 3.0   INR 1.31*  --   --   --              Recent Labs     01/15/25  1838   INR 1.31*   APTT 34.4       MICROBIOLOGY:   Results       Procedure Component Value Units Date/Time    CoV-2, Flu A/B, And RSV by PCR (Pod[facility]) [[id number]] Collected: 01/15/25 2213    Order Status: Completed Specimen: Respirate from Nasopharyngeal Updated: 01/16/25 0011     Influenza virus A RNA Negative     Influenza virus B, PCR Negative     RSV, PCR Negative     SARS-CoV-2 by PCR NotDetected     Comment: [facility] providers: PLEASE REFER TO DE-ESCALATION AND RETESTING PROTOCOL  on [email or web address]    **The [facility] GeneXpert Xpress SARS-CoV-2 RT-PCR Test has been made  available for use under the Emergency Use Authorization (EUA) only.          SARS-CoV-2 Source NP Swab             IMAGING:   DX-ESOPHAGUS - [id number]   Final Result      US-RUQ   Final Result         1.  Gallbladder wall thickening with edema, without visualized shadowing stones, consider acalculous cholecystitis. Could be further evaluated  with HIDA scan as clinically appropriate   2.  Echogenic lesion near the hepatic dome, appearance suggesting hemangioma, otherwise indeterminate, could be followed up with three-phase CT liver for further characterization.   3.  Dilatation the right renal pelvis, consider ex renal pelvis morphology or mild hydronephrosis   4.  Hepatomegaly   5.  Echogenic liver, compatible with fatty change versus fibrosis.      CT-CHEST,ABDOMEN,PELVIS WITH   Final Result         1.  Scattered bilateral pulmonary infiltrates   2.  Small right and trace left pleural effusions.   3.  Gallbladder wall thickening with possible pericholecystic fluid, could be further evaluated with right upper quadrant sonogram   4.  Right hepatic lobe low-density lesion, indeterminate, could be followed up with three-phase CT of the liver for further characterization.   5.  Mild mediastinal adenopathy, consider causes of adenopathy with additional workup as clinically appropriate   6.  Atherosclerosis and atherosclerotic coronary artery disease      DX-CHEST-PORTABLE (1 VIEW)   Final Result      1. Small bilateral pleural effusions and bibasal parenchymal consolidations, greater on the right than the left. Differential diagnosis includes atelectasis and/or pneumonia, with pulmonary edema favored less likely.   2. Atherosclerosis.          MEDS:  Current Facility-Administered Medications   Medication Last Admin    doxycycline monohydrate (Adoxa) tablet 100 mg 100 mg at 01/18/25 0457    apixaban (Eliquis) tablet 5 mg 5 mg at 01/18/25 0457    atorvastatin (Lipitor) tablet 40 mg 40 mg at 01/18/25 0457    [Held by provider] carvedilol (Coreg) tablet 3.125 mg      [Held by provider] losartan (Cozaar) tablet 25 mg      [Held by provider] spironolactone (Aldactone) tablet 25 mg      sertraline (Zoloft) tablet 25 mg 25 mg at 01/18/25 0457    insulin GLARGINE (Lantus,Semglee) injection 13 Units at 01/17/25 1802    And    insulin lispro (HumaLOG,AdmeLOG)  subcutaneous injection 1 Units at 01/18/25 1210    And    dextrose 50% (D50W) injection 25 g      senna-docusate (Pericolace Or Senokot S) 8.6-50 MG per tablet 2 Tablet      And    polyethylene glycol/lytes (Miralax) Packet 1 Packet      dextrose 50% (D50W) injection 25 g      cefTRIAXone (Rocephin) syringe 1,000 mg 1,000 mg at 01/18/25 0458    diphenhydrAMINE (Benadryl) injection 25 mg         ASSESSMENT/PLAN: Rogelio Padilla is a 74 y.o. male admitted with:    * Hypoxia- (present on admission)  Assessment & Plan  #pleural effusion  #Pulmonary infiltrates   #leukocytosis  Choking event while eating dinner on 1/15/2025.  Recently became adentulous.  High risk for aspiration.  No O2 demand at baseline, hovering between 3 to 5 L while inpatient.  Significant desats into the 80s-70s while eating meals.  Recently recovering from viral illness and given his leukocytosis and CT chest demonstrating pulmonary infiltrates and pleural effusion there is concern for post viral pneumonia process. Faint inspiratory wheezing and faint crackles at the bases with diminished breath sounds throughout on exam.  Given his poor glycemic control, will treat for pneumonia. Penicillin allergy so we will go with ceftriaxone and doxycycline.  With mentioning as patient has had an incidental finding of possible cholecystic fluid and gallbladder thickening ceftriaxone may contributed to formation of biliary sludge which should continue to be monitored.    Continue Ceftriaxone and doxycycline   Titrate oxygen as tolerated   CBC daily   If worsening oxygen demand repeat chest xray and labs   PT/OT recommending home health for continued PT needs, referral sent  Plan for home O2 eval tomorrow 1/19 and possible discharge with O2.        Depression  Assessment & Plan  History of depression on zoloft 25mg which will be continued inpatient.     Thickening of wall of gallbladder with pericholecystic fluid- (present on admission)  Assessment &  Plan  Incidental finding on CT chest, abdomen, pelvis.  LFTs mildly elevated as is alkaline phosphatase as AST/ALT 69/116.  Alkaline phosphatase 147.  Bilirubin within normal limits.  Exam without tenderness in the right upper quadrant.  Patient is currently on ceftriaxone which can impact and contribute to the formation of biliary sludge. RUQ ultrasound completed with no gallstones noted but wall thickening and possible hemangioma. Patient remains asymptomatic.    Continue to monitor   Daily CMP   Referral to GI outpatient     Anemia- (present on admission)  Assessment & Plan  Asymptomatic and incidental finding.  MCV within normal limits.  Continue to monitor.    At high risk for aspiration- (present on admission)  Assessment & Plan  Patient presenting following choking on food leading to syncopal episode and hypoxia.  Speech eval and swallow study completed 1/16/25 with recommendations of minced soft foods and therapies.  Patient continued to desat while eating.  SLP reevaluated and has downgraded patient to puréed diet and thin liquids.    Puréed diet with thin liquids  Continue to follow speech recommendations   Low threshold for aspiration pneumonia so will monitor closely     Type 2 diabetes mellitus, with long-term current use of insulin (HCC)- (present on admission)  Assessment & Plan  Patient with poorly controlled, A1c in September 2024 was 9.3.  Patient taking Lantus 13 units nightly, no other antiglycemic medications as they were discontinued when he was having hypotensive episodes.  Blood sugar markedly elevated on presentation at 438.  We will update an A1c of 11.1%.  Blood sugars steadily improving while inpatient.    Glargine 13 U   Adjust if patient becomes NPO again to lower dose   Adjust per daily glucose trends   Sliding scale, low correctional   POCT glucose   Hypoglycemic protocol       Pleural effusion- (present on admission)  Assessment & Plan  Please see assessment and plan under  hypoxia    Chronic systolic congestive heart failure (HCC)- (present on admission)  Assessment & Plan  Chronic, diagnosed in [place] prior to moving to [place] late in 2024.  He has an echo in October 24 with no comparison available show an ejection fraction of 55 to 60%.  He follows with cardiology at [facility] who notes systolic congestive heart failure.  He is on Coreg 3.125 mg twice daily, losartan 25 mg, spironolactone 25 mg per chart review but is not taking these; no SGLT2 inhibitor but this could be a good addition given his poorly controlled diabetes. His GDMT medications have been stopped by cardiology due to low blood pressures.  Evidence of mild fluid overload on exam with 1+ pitting edema to the mid shins.  Also has pleural effusions which could be due to heart failure versus pulmonary infection versus flash pulmonary edema after choking event.    Monitor for need for lasix   Continue to hold coreg 3.125 mg, losartan 25 mg and spironolactone 25 mg per notes with cardiology, he has follow-up on 1/23/25 and can discuss then whether or not to restart and consider SGLT2 inhibitor   Strict I/O's   2L fluid restriction     Dyslipidemia- (present on admission)  Assessment & Plan  Chronic.  On atorvastatin 40 mg daily which will be continued while in the hospital    Primary hypertension- (present on admission)  Assessment & Plan  Chronic.  Stable.  Blood pressures on admission ranging 126-140/64-87.  He is receiving Lasix for his pleural effusion and pulmonary edema.  Also has co-occurring heart failure for which he receives carvedilol and spironolactone but has not been taking this since having hypotensive episodes.  Continue to hold his losartan 25 mg daily and continue to monitor his blood pressure while patient      Monitor blood pressure     Paroxysmal atrial fibrillation (HCC)- (present on admission)  Assessment & Plan  Chronic.  Was not in A-fib on admission.  Is on apixaban 5 mg twice daily which will be  continued while in the hospital.      Continue apixaban 5mg BID           Core Measures:  Fluids: None   Lines: PIV   Abx: C3 and doxycycline   Diet: Puréed and thin liquids  PPX: Apixaban   DISPO: Inpatient management for IV abx and oxygen supplementation. Patient is not cleared for discharge       CODE STATUS: FULL     Socrates Griffin M.D.   PGY1  [facility]

## 2025-01-18 NOTE — CARE PLAN
Problem: Respiratory  Goal: Patient will achieve/maintain optimum respiratory ventilation and gas exchange  Description: Target End Date:  Prior to discharge or change in level of care    Document on Assessment flowsheet    1.  Assess and monitor rate, rhythm, depth and effort of respiration  2.  Breath sounds assessed qshift and/or as needed  3.  Assess O2 saturation, administer/titrate oxygen as ordered  4.  Position patient for maximum ventilatory efficiency  5.  Turn, cough, and deep breath with splinting to improve effectiveness  6.  Collaborate with RT to administer medication/treatments per order  7.  Encourage use of incentive spirometer and encourage patient to cough after use and utilize splinting techniques if applicable  8.  Airway suctioning  9.  Monitor sputum production for changes in color, consistency and frequency  10. Perform frequent oral hygiene  11. Alternate physical activity with rest periods  Outcome: Progressing     Problem: Respiratory  Goal: Patient will achieve/maintain optimum respiratory ventilation and gas exchange  Description: Target End Date:  Prior to discharge or change in level of care    Document on Assessment flowsheet    1.  Assess and monitor rate, rhythm, depth and effort of respiration  2.  Breath sounds assessed qshift and/or as needed  3.  Assess O2 saturation, administer/titrate oxygen as ordered  4.  Position patient for maximum ventilatory efficiency  5.  Turn, cough, and deep breath with splinting to improve effectiveness  6.  Collaborate with RT to administer medication/treatments per order  7.  Encourage use of incentive spirometer and encourage patient to cough after use and utilize splinting techniques if applicable  8.  Airway suctioning  9.  Monitor sputum production for changes in color, consistency and frequency  10. Perform frequent oral hygiene  11. Alternate physical activity with rest periods  Outcome: Progressing     Problem: Knowledge Deficit -  Standard  Goal: Patient and family/care givers will demonstrate understanding of plan of care, disease process/condition, diagnostic tests and medications  Description: Target End Date:  1-3 days or as soon as patient condition allows    Document in Patient Education    1.  Patient and family/caregiver oriented to unit, equipment, visitation policy and means for communicating concern  2.  Complete/review Learning Assessment  3.  Assess knowledge level of disease process/condition, treatment plan, diagnostic tests and medications  4.  Explain disease process/condition, treatment plan, diagnostic tests and medications  Outcome: Met     Problem: Dysphagia  Goal: Dysphagia will improve  Description: Target End Date:  Prior to discharge or change in level of care    1.  Assess and monitor ability to swallow  2.  Collaborate with Speech Therapy to determine appropriate adaptation for safe administration of medications and oral nutrition  3.  Elevate head of bed to 90 degrees during feedings and for 30 minutes after each feeding  4.  Encourage proper swallowing techniques  5.  Screening on admission or as soon as possible  Outcome: Met     Problem: Knowledge Deficit - Standard  Goal: Patient and family/care givers will demonstrate understanding of plan of care, disease process/condition, diagnostic tests and medications  Description: Target End Date:  1-3 days or as soon as patient condition allows    Document in Patient Education    1.  Patient and family/caregiver oriented to unit, equipment, visitation policy and means for communicating concern  2.  Complete/review Learning Assessment  3.  Assess knowledge level of disease process/condition, treatment plan, diagnostic tests and medications  4.  Explain disease process/condition, treatment plan, diagnostic tests and medications  Outcome: Met     Problem: Dysphagia  Goal: Dysphagia will improve  Description: Target End Date:  Prior to discharge or change in level of  care    1.  Assess and monitor ability to swallow  2.  Collaborate with Speech Therapy to determine appropriate adaptation for safe administration of medications and oral nutrition  3.  Elevate head of bed to 90 degrees during feedings and for 30 minutes after each feeding  4.  Encourage proper swallowing techniques  5.  Screening on admission or as soon as possible  Outcome: Met   The patient is Stable - Low risk of patient condition declining or worsening    Shift Goals  Clinical Goals: Safe mobility, improe oxygenation, aspiration precaution, stable vital signs  Patient Goals: Rest,comfort  Family Goals: N/A    Progress made toward(s) clinical / shift goals:     Pt. Vital signs WDL. Pt. On 2.5 L of oxygen via NC.   Pt. Verbalized understanding on the care provided.   Pt. Tolerated  level 4  diet.

## 2025-01-19 ENCOUNTER — APPOINTMENT (OUTPATIENT)
Dept: RADIOLOGY | Facility: MEDICAL CENTER | Age: 75
DRG: 177 | End: 2025-01-19
Payer: MEDICARE

## 2025-01-19 PROBLEM — J96.00 ACUTE RESPIRATORY FAILURE (HCC): Status: ACTIVE | Noted: 2025-01-15

## 2025-01-19 LAB
ALBUMIN SERPL BCP-MCNC: 3.1 G/DL (ref 3.2–4.9)
ALBUMIN/GLOB SERPL: 1.1 G/DL
ALP SERPL-CCNC: 122 U/L (ref 30–99)
ALT SERPL-CCNC: 58 U/L (ref 2–50)
ANION GAP SERPL CALC-SCNC: 9 MMOL/L (ref 7–16)
AST SERPL-CCNC: 25 U/L (ref 12–45)
BILIRUB SERPL-MCNC: 0.2 MG/DL (ref 0.1–1.5)
BUN SERPL-MCNC: 24 MG/DL (ref 8–22)
CALCIUM ALBUM COR SERPL-MCNC: 9.9 MG/DL (ref 8.5–10.5)
CALCIUM SERPL-MCNC: 9.2 MG/DL (ref 8.5–10.5)
CHLORIDE SERPL-SCNC: 100 MMOL/L (ref 96–112)
CO2 SERPL-SCNC: 28 MMOL/L (ref 20–33)
CREAT SERPL-MCNC: 0.7 MG/DL (ref 0.5–1.4)
ERYTHROCYTE [DISTWIDTH] IN BLOOD BY AUTOMATED COUNT: 56.2 FL (ref 35.9–50)
GFR SERPLBLD CREATININE-BSD FMLA CKD-EPI: 98 ML/MIN/1.73 M 2
GLOBULIN SER CALC-MCNC: 2.8 G/DL (ref 1.9–3.5)
GLUCOSE BLD STRIP.AUTO-MCNC: 223 MG/DL (ref 65–99)
GLUCOSE BLD STRIP.AUTO-MCNC: 248 MG/DL (ref 65–99)
GLUCOSE BLD STRIP.AUTO-MCNC: 251 MG/DL (ref 65–99)
GLUCOSE BLD STRIP.AUTO-MCNC: 94 MG/DL (ref 65–99)
GLUCOSE SERPL-MCNC: 260 MG/DL (ref 65–99)
HCT VFR BLD AUTO: 29.8 % (ref 42–52)
HGB BLD-MCNC: 9.4 G/DL (ref 14–18)
MCH RBC QN AUTO: 27.9 PG (ref 27–33)
MCHC RBC AUTO-ENTMCNC: 31.5 G/DL (ref 32.3–36.5)
MCV RBC AUTO: 88.4 FL (ref 81.4–97.8)
PLATELET # BLD AUTO: 237 K/UL (ref 164–446)
PMV BLD AUTO: 10.5 FL (ref 9–12.9)
POTASSIUM SERPL-SCNC: 4.3 MMOL/L (ref 3.6–5.5)
PROCALCITONIN SERPL-MCNC: 0.96 NG/ML
PROT SERPL-MCNC: 5.9 G/DL (ref 6–8.2)
RBC # BLD AUTO: 3.37 M/UL (ref 4.7–6.1)
SCCMEC + MECA PNL NOSE NAA+PROBE: NEGATIVE
SODIUM SERPL-SCNC: 137 MMOL/L (ref 135–145)
WBC # BLD AUTO: 7.5 K/UL (ref 4.8–10.8)

## 2025-01-19 PROCEDURE — 87449 NOS EACH ORGANISM AG IA: CPT

## 2025-01-19 PROCEDURE — 82962 GLUCOSE BLOOD TEST: CPT | Mod: 91

## 2025-01-19 PROCEDURE — 99232 SBSQ HOSP IP/OBS MODERATE 35: CPT | Performed by: STUDENT IN AN ORGANIZED HEALTH CARE EDUCATION/TRAINING PROGRAM

## 2025-01-19 PROCEDURE — 84145 PROCALCITONIN (PCT): CPT

## 2025-01-19 PROCEDURE — 71045 X-RAY EXAM CHEST 1 VIEW: CPT

## 2025-01-19 PROCEDURE — 87899 AGENT NOS ASSAY W/OPTIC: CPT

## 2025-01-19 PROCEDURE — 700102 HCHG RX REV CODE 250 W/ 637 OVERRIDE(OP)

## 2025-01-19 PROCEDURE — 36415 COLL VENOUS BLD VENIPUNCTURE: CPT

## 2025-01-19 PROCEDURE — 85027 COMPLETE CBC AUTOMATED: CPT

## 2025-01-19 PROCEDURE — 770006 HCHG ROOM/CARE - MED/SURG/GYN SEMI*

## 2025-01-19 PROCEDURE — A9270 NON-COVERED ITEM OR SERVICE: HCPCS

## 2025-01-19 PROCEDURE — 700102 HCHG RX REV CODE 250 W/ 637 OVERRIDE(OP): Performed by: INTERNAL MEDICINE

## 2025-01-19 PROCEDURE — 94669 MECHANICAL CHEST WALL OSCILL: CPT

## 2025-01-19 PROCEDURE — A9270 NON-COVERED ITEM OR SERVICE: HCPCS | Performed by: INTERNAL MEDICINE

## 2025-01-19 PROCEDURE — 700105 HCHG RX REV CODE 258

## 2025-01-19 PROCEDURE — 99223 1ST HOSP IP/OBS HIGH 75: CPT | Performed by: INTERNAL MEDICINE

## 2025-01-19 PROCEDURE — 700111 HCHG RX REV CODE 636 W/ 250 OVERRIDE (IP)

## 2025-01-19 PROCEDURE — 87641 MR-STAPH DNA AMP PROBE: CPT

## 2025-01-19 PROCEDURE — 80053 COMPREHEN METABOLIC PANEL: CPT

## 2025-01-19 RX ADMIN — INSULIN LISPRO 3 UNITS: 100 INJECTION, SOLUTION INTRAVENOUS; SUBCUTANEOUS at 22:36

## 2025-01-19 RX ADMIN — ATORVASTATIN CALCIUM 40 MG: 40 TABLET, FILM COATED ORAL at 06:32

## 2025-01-19 RX ADMIN — DOXYCYCLINE 100 MG: 100 TABLET, FILM COATED ORAL at 17:52

## 2025-01-19 RX ADMIN — INSULIN LISPRO 2 UNITS: 100 INJECTION, SOLUTION INTRAVENOUS; SUBCUTANEOUS at 17:55

## 2025-01-19 RX ADMIN — SERTRALINE HYDROCHLORIDE 25 MG: 50 TABLET ORAL at 06:32

## 2025-01-19 RX ADMIN — APIXABAN 5 MG: 5 TABLET, FILM COATED ORAL at 17:53

## 2025-01-19 RX ADMIN — SENNOSIDES AND DOCUSATE SODIUM 2 TABLET: 50; 8.6 TABLET ORAL at 22:40

## 2025-01-19 RX ADMIN — DOXYCYCLINE 100 MG: 100 TABLET, FILM COATED ORAL at 06:32

## 2025-01-19 RX ADMIN — INSULIN LISPRO 2 UNITS: 100 INJECTION, SOLUTION INTRAVENOUS; SUBCUTANEOUS at 13:02

## 2025-01-19 RX ADMIN — CEFTRIAXONE SODIUM 1000 MG: 10 INJECTION, POWDER, FOR SOLUTION INTRAVENOUS at 06:32

## 2025-01-19 RX ADMIN — APIXABAN 5 MG: 5 TABLET, FILM COATED ORAL at 06:32

## 2025-01-19 ASSESSMENT — ENCOUNTER SYMPTOMS
FEVER: 0
SPUTUM PRODUCTION: 1
COUGH: 1
SHORTNESS OF BREATH: 1
CHILLS: 0
WEIGHT LOSS: 0

## 2025-01-19 ASSESSMENT — COPD QUESTIONNAIRES
COPD SCREENING SCORE: 2
DURING THE PAST 4 WEEKS HOW MUCH DID YOU FEEL SHORT OF BREATH: NONE/LITTLE OF THE TIME
DO YOU EVER COUGH UP ANY MUCUS OR PHLEGM?: NO/ONLY WITH OCCASIONAL COLDS OR INFECTIONS
HAVE YOU SMOKED AT LEAST 100 CIGARETTES IN YOUR ENTIRE LIFE: NO/DON'T KNOW

## 2025-01-19 ASSESSMENT — PAIN DESCRIPTION - PAIN TYPE
TYPE: ACUTE PAIN
TYPE: ACUTE PAIN

## 2025-01-19 NOTE — CONSULTS
Pulmonary Consultation    Date of consult: 1/19/2025    Referring Physician  Biju Bernard M.D.    Reason for Consultation  Acute hypoxemic respiratory failure in setting of choking incident    History of Presenting Illness  74 y.o. male who presented 1/15/2025 with complaints of choking at home.  PMH paroxysmal atrial fibrillation, hypertension, hyperlipidemia, non-insulin-dependent diabetes mellitus, brought in to the emergency room after choking on broccoli cheddar soup and crackers during dinner.  He was reportedly found down with a pulse ox of 45%.  He was unresponsive and administered supplemental oxygen but did not require intubation.  In the ER he was able to be weaned to 4 L oxygen and cognitively intact.  Recent sick contacts at home around the holidays with family.    CT chest demonstrated scattered bilateral reticular/tree-in-bud and consolidated opacities, with dense right lower lobe consolidation with surrounding pleural effusion.  Also identified gallbladder wall thickening with pericholecystic fluid and a low-density lesion in the right hepatic lobe    An SLP evaluation confirmed oropharyngeal and esophageal dysphagia.  FEES demonstrated retention and retrograde flow below the level of the upper esophageal sphincter.  He was started on empiric antibiotics, was slowly improving, then had another aspiration event.  Repeat CXR demonstrates enlarging right pleural effusion.  He has been on doxycycline/ceftriaxone since admission.  Procalcitonin downtrending.  Flu/COVID/RSV swab was negative.  No sputum cultures obtained.    He states he has been dealing with dysphagia and difficulty swallowing x 2-3 months. No weight loss. Recently underwent an EGD/colonoscopy in December 2024    Code Status  Full Code    Review of Systems  Review of Systems   Constitutional:  Negative for chills, fever and weight loss.   Respiratory:  Positive for cough, sputum production and shortness of breath.         Dysphagia      Past Medical History   has a past medical history of Atrial fibrillation (HCC), CHF (congestive heart failure) (HCC), Diabetes mellitus type 2, noninsulin dependent (HCC), Hyperlipidemia, and Hypertension.    Surgical History   has a past surgical history that includes hernia repair.    Family History  family history includes Heart Attack in his mother.    Social History   reports that he has never smoked. He has been exposed to tobacco smoke. He has never used smokeless tobacco. He reports that he does not currently use alcohol after a past usage of about 3.6 oz of alcohol per week. He reports that he does not use drugs.    Medications  Home Medications       Reviewed by Edilia Odom (Pharmacy Tech) on 01/15/25 at 2117  Med List Status: Complete     Medication Last Dose Status   Alcohol Swabs  Active   apixaban (ELIQUIS) 5mg Tab 1/15/2025 Active   atorvastatin (LIPITOR) 40 MG Tab 1/15/2025 Active   Blood Glucose Meter Kit  Active   Blood Glucose Test Strips  Active   carvedilol (COREG) 3.125 MG Tab  Active   Continuous Glucose  (FREESTYLE HALEY 3 READER) Device  Active   Dulaglutide (TRULICITY) 0.75 MG/0.5ML Solution Auto-injector  Active   glucose blood (ONETOUCH ULTRA) strip  Active   insulin glargine 100 UNIT/ML Solution Pen-injector injection 1/14/2025 Active   Insulin Pen Needle 32 G x 4 mm  Active   Lancets  Active   losartan (COZAAR) 25 MG Tab Not Taking Active   NON SPECIFIED 12/24/2024 Active   sennosides (SENOKOT) 8.6 MG Tab  Active   sertraline (ZOLOFT) 25 MG tablet 1/15/2025 Active   spironolactone (ALDACTONE) 25 MG Tab  Active                  Audit from Redirected Encounters    **Home medications have not yet been reviewed for this encounter**       Current Facility-Administered Medications   Medication Dose Route Frequency Provider Last Rate Last Admin    Respiratory Therapy Consult   Nebulization Continuous RT Socrates Griffin M.D.        doxycycline monohydrate (Adoxa) tablet 100  mg  100 mg Oral Q12HRS Emre Amaro M.D.   100 mg at 01/19/25 0632    apixaban (Eliquis) tablet 5 mg  5 mg Oral BID Brando Leroy M.D.   5 mg at 01/19/25 0632    atorvastatin (Lipitor) tablet 40 mg  40 mg Oral DAILY Brando Leroy M.D.   40 mg at 01/19/25 0632    [Held by provider] carvedilol (Coreg) tablet 3.125 mg  3.125 mg Oral BID Brando Leroy M.D.        [Held by provider] losartan (Cozaar) tablet 25 mg  25 mg Oral DAILY Brando Leroy M.D.        [Held by provider] spironolactone (Aldactone) tablet 25 mg  25 mg Oral DAILY Brando Leroy M.D.        sertraline (Zoloft) tablet 25 mg  25 mg Oral DAILY Brando Leroy M.D.   25 mg at 01/19/25 0632    insulin GLARGINE (Lantus,Semglee) injection  0.2 Units/kg/day Subcutaneous Q EVENING Daphney Ramos M.D.   13 Units at 01/18/25 1736    And    insulin lispro (HumaLOG,AdmeLOG) subcutaneous injection  1-6 Units Subcutaneous 4X/DAY ACHHANNY Leroy M.D.   2 Units at 01/19/25 1302    And    dextrose 50% (D50W) injection 25 g  25 g Intravenous Q15 MIN PRN Brando Leroy M.D.        senna-docusate (Pericolace Or Senokot S) 8.6-50 MG per tablet 2 Tablet  2 Tablet Oral QHS PRN Brando Leroy M.D.        And    polyethylene glycol/lytes (Miralax) Packet 1 Packet  1 Packet Oral QDAY PRN Brando Leroy M.D.        dextrose 50% (D50W) injection 25 g  25 g Intravenous Q15 MIN PRN Brando Leroy M.D.        cefTRIAXone (Rocephin) syringe 1,000 mg  1,000 mg Intravenous Q24HRS Emre Amaro M.D.   1,000 mg at 01/19/25 0632    diphenhydrAMINE (Benadryl) injection 25 mg  25 mg Intravenous Q6HRS PRN Brando Leroy M.D.           Allergies  Allergies   Allergen Reactions    Metformin Diarrhea          Neosporin [Bacitracin-Polymyxin B] Rash          Penicillins Rash             Vital Signs last 24 hours  Temp:  [36.4 °C (97.6 °F)-36.7 °C (98.1 °F)] 36.4 °C (97.6 °F)  Pulse:  [65-74] 68  Resp:  [14-18] 18  BP: (132-159)/(69-88) 132/69  SpO2:  [93  %-97 %] 94 %    Physical Exam  Physical Exam  Vitals and nursing note reviewed.   Constitutional:       General: He is not in acute distress.     Appearance: He is well-developed. He is ill-appearing. He is not diaphoretic.      Comments: Very pleasant   HENT:      Nose: Nose normal.      Mouth/Throat:      Pharynx: No oropharyngeal exudate.      Comments: edentulous  Eyes:      General: No scleral icterus.        Right eye: No discharge.         Left eye: No discharge.      Conjunctiva/sclera: Conjunctivae normal.      Pupils: Pupils are equal, round, and reactive to light.   Neck:      Thyroid: No thyromegaly.      Vascular: No JVD.      Trachea: No tracheal deviation.   Cardiovascular:      Rate and Rhythm: Normal rate and regular rhythm.      Heart sounds: Normal heart sounds. No murmur heard.  Pulmonary:      Effort: Pulmonary effort is normal. No respiratory distress.      Breath sounds: Normal breath sounds. No stridor. No wheezing or rales.      Comments: Decreased breath sounds R base  Abdominal:      General: There is no distension.      Palpations: Abdomen is soft.      Tenderness: There is no abdominal tenderness. There is no guarding.   Musculoskeletal:         General: No tenderness. Normal range of motion.      Cervical back: Neck supple.   Lymphadenopathy:      Cervical: No cervical adenopathy.   Skin:     General: Skin is warm and dry.      Capillary Refill: Capillary refill takes less than 2 seconds.      Coloration: Skin is not pale.      Findings: No erythema.   Neurological:      Mental Status: He is alert and oriented to person, place, and time.      Sensory: No sensory deficit.      Motor: No abnormal muscle tone.      Coordination: Coordination normal.      Deep Tendon Reflexes: Reflexes normal.      Comments: Speech is slow and slurred   Psychiatric:         Behavior: Behavior normal.         Thought Content: Thought content normal.         Judgment: Judgment normal.        Fluids    Intake/Output Summary (Last 24 hours) at 1/19/2025 1536  Last data filed at 1/19/2025 0555  Gross per 24 hour   Intake 300 ml   Output 625 ml   Net -325 ml       Laboratory  Recent Results (from the past 48 hours)   POCT glucose device results    Collection Time: 01/17/25  5:55 PM   Result Value Ref Range    POC Glucose, Blood 90 65 - 99 mg/dL   POCT glucose device results    Collection Time: 01/17/25  9:56 PM   Result Value Ref Range    POC Glucose, Blood 186 (H) 65 - 99 mg/dL   CBC WITHOUT DIFFERENTIAL    Collection Time: 01/18/25  1:22 AM   Result Value Ref Range    WBC 8.8 4.8 - 10.8 K/uL    RBC 3.46 (L) 4.70 - 6.10 M/uL    Hemoglobin 9.5 (L) 14.0 - 18.0 g/dL    Hematocrit 30.9 (L) 42.0 - 52.0 %    MCV 89.3 81.4 - 97.8 fL    MCH 27.5 27.0 - 33.0 pg    MCHC 30.7 (L) 32.3 - 36.5 g/dL    RDW 57.6 (H) 35.9 - 50.0 fL    Platelet Count 248 164 - 446 K/uL    MPV 11.2 9.0 - 12.9 fL   Comp Metabolic Panel    Collection Time: 01/18/25  1:22 AM   Result Value Ref Range    Sodium 137 135 - 145 mmol/L    Potassium 4.0 3.6 - 5.5 mmol/L    Chloride 100 96 - 112 mmol/L    Co2 27 20 - 33 mmol/L    Anion Gap 10.0 7.0 - 16.0    Glucose 154 (H) 65 - 99 mg/dL    Bun 21 8 - 22 mg/dL    Creatinine 0.57 0.50 - 1.40 mg/dL    Calcium 9.3 8.5 - 10.5 mg/dL    Correct Calcium 9.9 8.5 - 10.5 mg/dL    AST(SGOT) 23 12 - 45 U/L    ALT(SGPT) 63 (H) 2 - 50 U/L    Alkaline Phosphatase 121 (H) 30 - 99 U/L    Total Bilirubin 0.2 0.1 - 1.5 mg/dL    Albumin 3.2 3.2 - 4.9 g/dL    Total Protein 6.2 6.0 - 8.2 g/dL    Globulin 3.0 1.9 - 3.5 g/dL    A-G Ratio 1.1 g/dL   ESTIMATED GFR    Collection Time: 01/18/25  1:22 AM   Result Value Ref Range    GFR (CKD-EPI) 104 >60 mL/min/1.73 m 2   POCT glucose device results    Collection Time: 01/18/25  7:47 AM   Result Value Ref Range    POC Glucose, Blood 96 65 - 99 mg/dL   POCT glucose device results    Collection Time: 01/18/25 11:56 AM   Result Value Ref Range    POC Glucose, Blood 194 (H) 65 -  99 mg/dL   POCT glucose device results    Collection Time: 01/18/25  5:32 PM   Result Value Ref Range    POC Glucose, Blood 242 (H) 65 - 99 mg/dL   POCT glucose device results    Collection Time: 01/18/25  8:52 PM   Result Value Ref Range    POC Glucose, Blood 136 (H) 65 - 99 mg/dL   PROCALCITONIN    Collection Time: 01/19/25 12:07 AM   Result Value Ref Range    Procalcitonin 0.96 (H) <0.25 ng/mL   CBC WITHOUT DIFFERENTIAL    Collection Time: 01/19/25 12:07 AM   Result Value Ref Range    WBC 7.5 4.8 - 10.8 K/uL    RBC 3.37 (L) 4.70 - 6.10 M/uL    Hemoglobin 9.4 (L) 14.0 - 18.0 g/dL    Hematocrit 29.8 (L) 42.0 - 52.0 %    MCV 88.4 81.4 - 97.8 fL    MCH 27.9 27.0 - 33.0 pg    MCHC 31.5 (L) 32.3 - 36.5 g/dL    RDW 56.2 (H) 35.9 - 50.0 fL    Platelet Count 237 164 - 446 K/uL    MPV 10.5 9.0 - 12.9 fL   Comp Metabolic Panel    Collection Time: 01/19/25 12:07 AM   Result Value Ref Range    Sodium 137 135 - 145 mmol/L    Potassium 4.3 3.6 - 5.5 mmol/L    Chloride 100 96 - 112 mmol/L    Co2 28 20 - 33 mmol/L    Anion Gap 9.0 7.0 - 16.0    Glucose 260 (H) 65 - 99 mg/dL    Bun 24 (H) 8 - 22 mg/dL    Creatinine 0.70 0.50 - 1.40 mg/dL    Calcium 9.2 8.5 - 10.5 mg/dL    Correct Calcium 9.9 8.5 - 10.5 mg/dL    AST(SGOT) 25 12 - 45 U/L    ALT(SGPT) 58 (H) 2 - 50 U/L    Alkaline Phosphatase 122 (H) 30 - 99 U/L    Total Bilirubin 0.2 0.1 - 1.5 mg/dL    Albumin 3.1 (L) 3.2 - 4.9 g/dL    Total Protein 5.9 (L) 6.0 - 8.2 g/dL    Globulin 2.8 1.9 - 3.5 g/dL    A-G Ratio 1.1 g/dL   ESTIMATED GFR    Collection Time: 01/19/25 12:07 AM   Result Value Ref Range    GFR (CKD-EPI) 98 >60 mL/min/1.73 m 2   POCT glucose device results    Collection Time: 01/19/25  7:48 AM   Result Value Ref Range    POC Glucose, Blood 94 65 - 99 mg/dL   POCT glucose device results    Collection Time: 01/19/25 12:48 PM   Result Value Ref Range    POC Glucose, Blood 223 (H) 65 - 99 mg/dL     Imaging  DX-CHEST-PORTABLE (1 VIEW)   Final Result   Addendum (preliminary)  1 of 1   Correction: The patient does not have a pneumothorax. The body of the    report should indicate no pneumothorax.      Final         1. Increasing moderate base atelectasis and small right effusion.                  DX-ESOPHAGUS - [id number]   Final Result      US-RUQ   Final Result         1.  Gallbladder wall thickening with edema, without visualized shadowing stones, consider acalculous cholecystitis. Could be further evaluated with HIDA scan as clinically appropriate   2.  Echogenic lesion near the hepatic dome, appearance suggesting hemangioma, otherwise indeterminate, could be followed up with three-phase CT liver for further characterization.   3.  Dilatation the right renal pelvis, consider ex renal pelvis morphology or mild hydronephrosis   4.  Hepatomegaly   5.  Echogenic liver, compatible with fatty change versus fibrosis.      CT-CHEST,ABDOMEN,PELVIS WITH   Final Result         1.  Scattered bilateral pulmonary infiltrates   2.  Small right and trace left pleural effusions.   3.  Gallbladder wall thickening with possible pericholecystic fluid, could be further evaluated with right upper quadrant sonogram   4.  Right hepatic lobe low-density lesion, indeterminate, could be followed up with three-phase CT of the liver for further characterization.   5.  Mild mediastinal adenopathy, consider causes of adenopathy with additional workup as clinically appropriate   6.  Atherosclerosis and atherosclerotic coronary artery disease      DX-CHEST-PORTABLE (1 VIEW)   Final Result      1. Small bilateral pleural effusions and bibasal parenchymal consolidations, greater on the right than the left. Differential diagnosis includes atelectasis and/or pneumonia, with pulmonary edema favored less likely.   2. Atherosclerosis.        Assessment/Plan    # Acute hypoxemic respiratory failure  # Multifocal aspiration pneumonia  # Parapneumonic effusion  # Mild/moderate oropharyngeal and esophageal dysphagia  #  Non-insulin dependent diabetes mellitus    -- Respiratory pathogen PCR panel.  MRSA nares.  Sputum cultures.  Legionella and strep urinary antigens.  Continue to trend procalcitonin every 48 hours.  Continue doxycycline/ceftriaxone x 10 days. Ok with transition to augmentin (liquid formulation) if deemed safe to swallow by SLP.  -- Titrate supplemental oxygen to maintain saturations in the low 90s.  -- Recommend IR consultation for right diagnostic/therapeutic thoracentesis due to persistent oxygen requirements and worsening infiltrate on CXR.  -- Recommend GI consultation due to recurrent aspiration and SLP findings noting retention and retrograde flow below the level of the upper esophageal sphincter.  May need consideration for PEG tube placement given recurrent aspiration issues.    We will sign off. Please do not hesitate to contact us if we can be of any further assistance. I am most easily reached via [employer] secure messaging application.    Discussed patient condition and risk of morbidity and/or mortality with resident and Patient.      This note was generated using voice recognition software which has a chance of producing errors of grammar and content.  I have made every reasonable attempt to find and correct any errors, but it should be expected that some may not be found prior to finalization of this note.  __________  Emre Amaro MD  Pulmonary and Critical Care Medicine  Replaced by [facility]

## 2025-01-19 NOTE — CARE PLAN
The patient is Stable - Low risk of patient condition declining or worsening    Shift Goals  Clinical Goals: maintain oxygen saturations above 90%  Patient Goals: rest  Family Goals: not present    Progress made toward(s) clinical / shift goals:  Pt will maintain O2 >92% 3L NC    Patient is not progressing towards the following goals:

## 2025-01-19 NOTE — CARE PLAN
Problem: Hyperinflation  Goal: Prevent or improve atelectasis  Description: Target End Date:  3 to 4 days    1. Instruct incentive spirometry usage  2.  Perform hyperinflation therapy as indicated  Outcome: Progressing        Respiratory Update  Treatment Modality: PEP  Frequency: QID  IS 1750    Pt tolerating current treatments well with no adverse reactions, will continue to monitor

## 2025-01-19 NOTE — CARE PLAN
The patient is Watcher - Medium risk of patient condition declining or worsening    Shift Goals  Clinical Goals: maintain oxygen saturations above 90%  Patient Goals: rest  Family Goals: not present    Progress made toward(s) clinical / shift goals:        Problem: Respiratory  Goal: Patient will achieve/maintain optimum respiratory ventilation and gas exchange  Outcome: Progressing     Problem: Nutrition  Goal: Patient's nutritional and fluid intake will be adequate or improve  Outcome: Progressing       Pt remained on 5L via oxymask/nasal cannula throughout the shift.

## 2025-01-19 NOTE — PROGRESS NOTES
[facility] FAMILY MEDICINE PROGRESS NOTE     Attending: Biju Bernard M.d.     Senior Resident: Daphney Ramos M.D.     Juventino Resident: Socrates Griffin M.D.     PATIENT: Rogelio Padilla; [id number]; 1950    ID: 74 y.o. male hospital day 5 admitted after a choking incident needing supplemental oxygen and possible pneumonia     SUBJECTIVE: Patient remained on 5L O2 overnight.  Continues on 5 L for most of the morning, but later morning was able to wean down to 3 L. He otherwise states he feels fine and has no acute complaints. Denies fever or chills.    OBJECTIVE:     Vitals:    01/18/25 1943 01/19/25 0441 01/19/25 0717 01/19/25 1123   BP: (!) 146/72 (!) 159/88 132/69    Pulse: 69 74 65 68   Resp: 17 18 14 18   Temp: 36.7 °C (98 °F) 36.7 °C (98.1 °F) 36.4 °C (97.6 °F)    TempSrc: Temporal Temporal Temporal    SpO2: 93% 97% 94% 94%   Weight:       Height:             Intake/Output Summary (Last 24 hours) at 1/19/2025 1613  Last data filed at 1/19/2025 0555  Gross per 24 hour   Intake 300 ml   Output 625 ml   Net -325 ml            PE:   General: No acute distress, resting comfortably in bed.  HEENT: NC/AT. PERRLA. EOMI. MMM  Cardiovascular: Normal S1/S2, RRR, no m/r/g  Respiratory: Symmetric inspiratory effort. Minimal breath sounds at bilateral lung bases, right greater than left. Bilateral apical crackles and wheezes, slightly worse  Abdomen: BS+, soft, NT/ND   EXT:  BRICE, 5/5 strength, 2+ pulses, no rashes, bruising, or bleeding. Mild pitting edema   Neuro: Non focal with no numbness, tingling or changes in sensation    LABS:  Recent Labs     01/17/25  0039 01/18/25  0122 01/19/25  0007   WBC 11.2* 8.8 7.5   RBC 3.21* 3.46* 3.37*   HEMOGLOBIN 9.0* 9.5* 9.4*   HEMATOCRIT 28.5* 30.9* 29.8*   MCV 88.8 89.3 88.4   MCH 28.0 27.5 27.9   RDW 56.4* 57.6* 56.2*   PLATELETCT 224 248 237   MPV 11.3 11.2 10.5     Recent Labs     01/17/25  0039 01/18/25  0122 01/19/25  0007   SODIUM 136 137 137   POTASSIUM 4.3 4.0 4.3  "  CHLORIDE 99 100 100   CO2 27 27 28   BUN 28* 21 24*   CREATININE 0.65 0.57 0.70   CALCIUM 9.4 9.3 9.2   ALBUMIN 3.4 3.2 3.1*     Estimated GFR/CRCL = Estimated Creatinine Clearance: 86.9 mL/min (by C-G formula based on SCr of 0.7 mg/dL).  Recent Labs     01/17/25  0039 01/18/25  0122 01/19/25  0007   GLUCOSE 217* 154* 260*     Recent Labs     01/17/25  0039 01/18/25  0122 01/19/25  0007   ASTSGOT 27 23 25   ALTSGPT 85* 63* 58*   TBILIRUBIN 0.2 0.2 0.2   ALKPHOSPHAT 133* 121* 122*   GLOBULIN 3.0 3.0 2.8             No results for input(s): \"INR\", \"APTT\", \"FIBRINOGEN\" in the last 72 hours.    Invalid input(s): \"DIMER\"      MICROBIOLOGY:   Results       Procedure Component Value Units Date/Time    CULTURE RESPIRATORY W/ GRM STN [[id number]]     Order Status: Completed Specimen: Respirate from Sputum     Respiratory Panel by PCR (Inpatient ONLY) [[id number]]     Order Status: No result Specimen: Nasopharyngeal     MRSA By PCR (Amp) [[id number]]     Order Status: No result Specimen: Respirate from Nares     CoV-2, Flu A/B, And RSV by PCR ([employer]) [[id number]] Collected: 01/15/25 2213    Order Status: Completed Specimen: Respirate from Nasopharyngeal Updated: 01/16/25 0011     Influenza virus A RNA Negative     Influenza virus B, PCR Negative     RSV, PCR Negative     SARS-CoV-2 by PCR NotDetected     Comment: [facility] providers: PLEASE REFER TO DE-ESCALATION AND RETESTING PROTOCOL  on [email or web address]    **The [employer] GeneXpert Xpress SARS-CoV-2 RT-PCR Test has been made  available for use under the Emergency Use Authorization (EUA) only.          SARS-CoV-2 Source NP Swab             IMAGING:   DX-CHEST-PORTABLE (1 VIEW)   Final Result   Addendum (preliminary) 1 of 1   Correction: The patient does not have a pneumothorax. The body of the    report should indicate no pneumothorax.      Final         1. Increasing moderate base atelectasis and small right effusion.                  DX-ESOPHAGUS - [id number]   Final Result "      US-RUQ   Final Result         1.  Gallbladder wall thickening with edema, without visualized shadowing stones, consider acalculous cholecystitis. Could be further evaluated with HIDA scan as clinically appropriate   2.  Echogenic lesion near the hepatic dome, appearance suggesting hemangioma, otherwise indeterminate, could be followed up with three-phase CT liver for further characterization.   3.  Dilatation the right renal pelvis, consider ex renal pelvis morphology or mild hydronephrosis   4.  Hepatomegaly   5.  Echogenic liver, compatible with fatty change versus fibrosis.      CT-CHEST,ABDOMEN,PELVIS WITH   Final Result         1.  Scattered bilateral pulmonary infiltrates   2.  Small right and trace left pleural effusions.   3.  Gallbladder wall thickening with possible pericholecystic fluid, could be further evaluated with right upper quadrant sonogram   4.  Right hepatic lobe low-density lesion, indeterminate, could be followed up with three-phase CT of the liver for further characterization.   5.  Mild mediastinal adenopathy, consider causes of adenopathy with additional workup as clinically appropriate   6.  Atherosclerosis and atherosclerotic coronary artery disease      DX-CHEST-PORTABLE (1 VIEW)   Final Result      1. Small bilateral pleural effusions and bibasal parenchymal consolidations, greater on the right than the left. Differential diagnosis includes atelectasis and/or pneumonia, with pulmonary edema favored less likely.   2. Atherosclerosis.          MEDS:  Current Facility-Administered Medications   Medication Last Admin    Respiratory Therapy Consult      doxycycline monohydrate (Adoxa) tablet 100 mg 100 mg at 01/19/25 0632    apixaban (Eliquis) tablet 5 mg 5 mg at 01/19/25 0632    atorvastatin (Lipitor) tablet 40 mg 40 mg at 01/19/25 0632    [Held by provider] carvedilol (Coreg) tablet 3.125 mg      [Held by provider] losartan (Cozaar) tablet 25 mg      [Held by provider] spironolactone  (Aldactone) tablet 25 mg      sertraline (Zoloft) tablet 25 mg 25 mg at 01/19/25 0632    insulin GLARGINE (Lantus,Semglee) injection 13 Units at 01/18/25 1736    And    insulin lispro (HumaLOG,AdmeLOG) subcutaneous injection 2 Units at 01/19/25 1302    And    dextrose 50% (D50W) injection 25 g      senna-docusate (Pericolace Or Senokot S) 8.6-50 MG per tablet 2 Tablet      And    polyethylene glycol/lytes (Miralax) Packet 1 Packet      dextrose 50% (D50W) injection 25 g      cefTRIAXone (Rocephin) syringe 1,000 mg 1,000 mg at 01/19/25 0632    diphenhydrAMINE (Benadryl) injection 25 mg         ASSESSMENT/PLAN: Rogelio Padilla is a 74 y.o. male admitted with:    * Acute respiratory failure (HCC)- (present on admission)  Assessment & Plan  #pleural effusion  #Pulmonary infiltrates   #leukocytosis  Choking event while eating dinner on 1/15/2025.  Recently became adentulous.  High risk for aspiration.  No O2 demand at baseline, hovering between 3 to 5 L while inpatient.  Per nursing, daughter reports patient would routinely desat to the 80s and 70s at PCP visits.  Significant desats into the 80s-70s while eating meals here.  Recently recovering from viral illness and given his leukocytosis and CT chest demonstrating pulmonary infiltrates and pleural effusion there is concern for post viral pneumonia process. Faint inspiratory wheezing and faint crackles at the bases with diminished breath sounds throughout on exam.  Penicillin allergy has been treated with ceftriaxone and doxycycline.  Patient has failed to improve so far remains on 5 L oxygen. Chest x-ray 1/19 showing increasing moderate right base atelectasis and small right pleural effusion, mild left base atelectasis remains the same.  Consulted Dr. Amaro, pulmonology.  He is recommending consulting GI for possible esophageal dysmotility, and IR for thoracentesis, which may help with his ongoing oxygen requirements.  Ceftriaxone and doxycycline adequate coverage for  aspiration pneumonia, he suggests total course of 10 days of antibiotics, can transition to Augmentin if patient is able to tolerate orally.    Pulmonology recs:   Continue IV ceftriaxone and doxycycline x 10 days total  Consider transition to oral Augmentin when appropriate  Respiratory panel, Legionella, sputum cultures ordered  Trend procalcitonin every 48 hours  Will consult GI and IR tomorrow morning  RT consult  Titrate oxygen as tolerated   If worsening oxygen demand repeat chest xray and labs   PT/OT recommending home health for continued PT needs, referral sent  Plan for home O2 eval before discharge        Depression  Assessment & Plan  History of depression on zoloft 25mg which will be continued inpatient.     Thickening of wall of gallbladder with pericholecystic fluid- (present on admission)  Assessment & Plan  Incidental finding on CT chest, abdomen, pelvis.  LFTs mildly elevated as is alkaline phosphatase as AST/ALT 69/116.  Alkaline phosphatase 147.  Bilirubin within normal limits.  Exam without tenderness in the right upper quadrant.  Patient is currently on ceftriaxone which can impact and contribute to the formation of biliary sludge. RUQ ultrasound completed with no gallstones noted but wall thickening and possible hemangioma. Patient remains asymptomatic.    Referral to GI outpatient     Anemia- (present on admission)  Assessment & Plan  Asymptomatic and incidental finding.  MCV within normal limits.  Continue to monitor.    At high risk for aspiration- (present on admission)  Assessment & Plan  Patient presenting following choking on food leading to syncopal episode and hypoxia.  Speech eval and swallow study completed 1/16/25 with recommendations of minced soft foods and therapies.  Patient continued to desat while eating.  SLP reevaluated and has downgraded patient to puréed diet and thin liquids.    Puréed diet with thin liquids  Continue to follow speech recommendations   Low threshold for  aspiration pneumonia so will monitor closely     Type 2 diabetes mellitus, with long-term current use of insulin (HCC)- (present on admission)  Assessment & Plan  Patient with poorly controlled, A1c in September 2024 was 9.3.  Patient taking Lantus 13 units nightly, no other antiglycemic medications as they were discontinued when he was having hypotensive episodes.  Blood sugar markedly elevated on presentation at 438.  We will update an A1c of 11.1%.  Blood sugars steadily improving while inpatient.    Glargine 13 U   Adjust if patient becomes NPO again to lower dose   Adjust per daily glucose trends   Sliding scale, low correctional   POCT glucose   Hypoglycemic protocol       Pleural effusion- (present on admission)  Assessment & Plan  Please see assessment and plan under hypoxia    Chronic systolic congestive heart failure (HCC)- (present on admission)  Assessment & Plan  Chronic, diagnosed in [place] prior to moving to [place] late in 2024.  Echo in October 24 with no comparison available show an ejection fraction of 55 to 60%.  He follows with cardiology at [facility] who notes systolic congestive heart failure.  He is on Coreg 3.125 mg twice daily, losartan 25 mg, spironolactone 25 mg per chart review but is not taking these; no SGLT2 inhibitor but this could be a good addition given his poorly controlled diabetes. His GDMT medications have been stopped by cardiology due to low blood pressures.  Evidence of mild fluid overload on exam with 1+ pitting edema to the mid shins.  Also has pleural effusions which could be due to heart failure versus pulmonary infection versus flash pulmonary edema after choking event.    Monitor for need for lasix   Continue to hold coreg 3.125 mg, losartan 25 mg and spironolactone 25 mg per notes with cardiology, he has follow-up on 1/23/25 and can discuss then whether or not to restart and consider SGLT2 inhibitor   Strict I/O's   2L fluid restriction     Dyslipidemia- (present on  admission)  Assessment & Plan  Chronic.  On atorvastatin 40 mg daily which will be continued while in the hospital    Primary hypertension- (present on admission)  Assessment & Plan  Chronic.  Stable.  Blood pressures on admission ranging 126-140/64-87.  He is receiving Lasix for his pleural effusion and pulmonary edema.  Also has co-occurring heart failure for which he receives carvedilol and spironolactone but has not been taking this since having hypotensive episodes.  Continue to hold his losartan 25 mg daily and continue to monitor his blood pressure while patient      Monitor blood pressure     Paroxysmal atrial fibrillation (HCC)- (present on admission)  Assessment & Plan  Chronic.  Was not in A-fib on admission.  Is on apixaban 5 mg twice daily which will be continued while in the hospital.      Continue apixaban 5mg BID         Core Measures:  Fluids: None   Lines: PIV   Abx: C3 and doxycycline   Diet: Puréed and thin liquids  PPX: Apixaban   DISPO: Inpatient management for IV abx and oxygen supplementation. Patient is not cleared for discharge       CODE STATUS: FULL     Socrates Griffin M.D.   PGY1  [facility]

## 2025-01-20 PROBLEM — R13.12 OROPHARYNGEAL DYSPHAGIA: Status: ACTIVE | Noted: 2025-01-15

## 2025-01-20 LAB
B PARAP IS1001 DNA NPH QL NAA+NON-PROBE: NOT DETECTED
B PERT.PT PRMT NPH QL NAA+NON-PROBE: NOT DETECTED
C PNEUM DNA NPH QL NAA+NON-PROBE: NOT DETECTED
ERYTHROCYTE [DISTWIDTH] IN BLOOD BY AUTOMATED COUNT: 57.1 FL (ref 35.9–50)
FLUAV RNA NPH QL NAA+NON-PROBE: NOT DETECTED
FLUBV RNA NPH QL NAA+NON-PROBE: NOT DETECTED
GLUCOSE BLD STRIP.AUTO-MCNC: 178 MG/DL (ref 65–99)
GLUCOSE BLD STRIP.AUTO-MCNC: 211 MG/DL (ref 65–99)
GLUCOSE BLD STRIP.AUTO-MCNC: 224 MG/DL (ref 65–99)
GLUCOSE BLD STRIP.AUTO-MCNC: 84 MG/DL (ref 65–99)
HADV DNA NPH QL NAA+NON-PROBE: NOT DETECTED
HCOV 229E RNA NPH QL NAA+NON-PROBE: NOT DETECTED
HCOV HKU1 RNA NPH QL NAA+NON-PROBE: NOT DETECTED
HCOV NL63 RNA NPH QL NAA+NON-PROBE: NOT DETECTED
HCOV OC43 RNA NPH QL NAA+NON-PROBE: NOT DETECTED
HCT VFR BLD AUTO: 33.1 % (ref 42–52)
HGB BLD-MCNC: 10.4 G/DL (ref 14–18)
HMPV RNA NPH QL NAA+NON-PROBE: NOT DETECTED
HPIV1 RNA NPH QL NAA+NON-PROBE: NOT DETECTED
HPIV2 RNA NPH QL NAA+NON-PROBE: NOT DETECTED
HPIV3 RNA NPH QL NAA+NON-PROBE: NOT DETECTED
HPIV4 RNA NPH QL NAA+NON-PROBE: NOT DETECTED
M PNEUMO DNA NPH QL NAA+NON-PROBE: NOT DETECTED
MCH RBC QN AUTO: 28.3 PG (ref 27–33)
MCHC RBC AUTO-ENTMCNC: 31.4 G/DL (ref 32.3–36.5)
MCV RBC AUTO: 90.2 FL (ref 81.4–97.8)
PLATELET # BLD AUTO: 252 K/UL (ref 164–446)
PMV BLD AUTO: 10.3 FL (ref 9–12.9)
RBC # BLD AUTO: 3.67 M/UL (ref 4.7–6.1)
RSV RNA NPH QL NAA+NON-PROBE: NOT DETECTED
RV+EV RNA NPH QL NAA+NON-PROBE: NOT DETECTED
SARS-COV-2 RNA NPH QL NAA+NON-PROBE: NOTDETECTED
WBC # BLD AUTO: 7.7 K/UL (ref 4.8–10.8)

## 2025-01-20 PROCEDURE — 36415 COLL VENOUS BLD VENIPUNCTURE: CPT

## 2025-01-20 PROCEDURE — 700102 HCHG RX REV CODE 250 W/ 637 OVERRIDE(OP)

## 2025-01-20 PROCEDURE — 94669 MECHANICAL CHEST WALL OSCILL: CPT

## 2025-01-20 PROCEDURE — 99232 SBSQ HOSP IP/OBS MODERATE 35: CPT | Performed by: STUDENT IN AN ORGANIZED HEALTH CARE EDUCATION/TRAINING PROGRAM

## 2025-01-20 PROCEDURE — 700105 HCHG RX REV CODE 258: Performed by: INTERNAL MEDICINE

## 2025-01-20 PROCEDURE — A9270 NON-COVERED ITEM OR SERVICE: HCPCS

## 2025-01-20 PROCEDURE — 82962 GLUCOSE BLOOD TEST: CPT

## 2025-01-20 PROCEDURE — 700111 HCHG RX REV CODE 636 W/ 250 OVERRIDE (IP): Performed by: INTERNAL MEDICINE

## 2025-01-20 PROCEDURE — 85027 COMPLETE CBC AUTOMATED: CPT

## 2025-01-20 PROCEDURE — A9270 NON-COVERED ITEM OR SERVICE: HCPCS | Performed by: INTERNAL MEDICINE

## 2025-01-20 PROCEDURE — 700102 HCHG RX REV CODE 250 W/ 637 OVERRIDE(OP): Performed by: INTERNAL MEDICINE

## 2025-01-20 PROCEDURE — 770006 HCHG ROOM/CARE - MED/SURG/GYN SEMI*

## 2025-01-20 PROCEDURE — 0202U NFCT DS 22 TRGT SARS-COV-2: CPT

## 2025-01-20 RX ADMIN — INSULIN LISPRO 1 UNITS: 100 INJECTION, SOLUTION INTRAVENOUS; SUBCUTANEOUS at 12:13

## 2025-01-20 RX ADMIN — INSULIN LISPRO 2 UNITS: 100 INJECTION, SOLUTION INTRAVENOUS; SUBCUTANEOUS at 23:42

## 2025-01-20 RX ADMIN — ATORVASTATIN CALCIUM 40 MG: 40 TABLET, FILM COATED ORAL at 04:25

## 2025-01-20 RX ADMIN — CEFTRIAXONE SODIUM 1000 MG: 10 INJECTION, POWDER, FOR SOLUTION INTRAVENOUS at 04:25

## 2025-01-20 RX ADMIN — DOXYCYCLINE 100 MG: 100 TABLET, FILM COATED ORAL at 04:25

## 2025-01-20 RX ADMIN — APIXABAN 5 MG: 5 TABLET, FILM COATED ORAL at 18:39

## 2025-01-20 RX ADMIN — INSULIN LISPRO 2 UNITS: 100 INJECTION, SOLUTION INTRAVENOUS; SUBCUTANEOUS at 18:35

## 2025-01-20 RX ADMIN — APIXABAN 5 MG: 5 TABLET, FILM COATED ORAL at 04:25

## 2025-01-20 RX ADMIN — SERTRALINE HYDROCHLORIDE 25 MG: 50 TABLET ORAL at 04:25

## 2025-01-20 RX ADMIN — DOXYCYCLINE 100 MG: 100 TABLET, FILM COATED ORAL at 18:39

## 2025-01-20 ASSESSMENT — ENCOUNTER SYMPTOMS
FEVER: 0
HEMOPTYSIS: 1
SORE THROAT: 1
COUGH: 1
CHILLS: 0
SPUTUM PRODUCTION: 1
SHORTNESS OF BREATH: 1

## 2025-01-20 ASSESSMENT — PAIN DESCRIPTION - PAIN TYPE: TYPE: ACUTE PAIN

## 2025-01-20 NOTE — DISCHARGE PLANNING
Case Management Discharge Planning    Admission Date: 1/15/2025  GMLOS: 4.6  ALOS: 5    6-Clicks ADL Score: 18  6-Clicks Mobility Score: 19      Anticipated Discharge Dispo: Discharge Disposition: D/T to home under HHA care in anticipation of covered skilled care (06)    DME Needed: Yes    DME Ordered: Yes  [facility] is first choice, he wants the POC, MD aware    Action(s) Taken: Updated Provider/Nurse on Discharge Plan, Choice obtained, Referral(s) sent, and DME Face to Face     HH choice is [facility] as he has had them previously.    Escalations Completed: None    Medically Clear: Yes    Next Steps:     Ray is having a thoracentesis today; walking sats requested in early morning dc rounds, to be done post thoracentesis, please.    Barriers to Discharge: Medical clearance, DME, and Pending Procedures    Is the patient up for discharge tomorrow: Possibly, dependent on post thoracentesis status and Oxygen delivery.

## 2025-01-20 NOTE — PROGRESS NOTES
[facility] INTERNAL MEDICINE PROGRESS NOTE   Medical Student Note   ** Educational Purposes ONLY - Please Refer to Note(s) Written by Attending/Residents **    Attending: Dr. Joana MCDONALD  Senior Resident: Dr. Richard M.D. (PGY-3)  Juventino Resident: Dr. Elias MCDONALD (PGY-1)  Medical Student: Garth Daija, MS3  PATIENT: Rogelio Padilla; [id number]; 1950    Hospital Day # Hospital Day: 6    ID: 74 y.o. male with past medical history of paroxysmal afib, HTN, HLD, diabetes, systolic CHF, recent prostate lesion w/ urology f/u was admitted on 1/15/2025 after choking on dinner requiring subsequent supplemental O2 and possible PNA.     Subjective:   This AM, pt states that his throat is feeling sore and reports he is still coughing up cecelia colored sputum. Discussed possibility of PEG tube placement, he said he would prefer not to, but will think about it if deemed necessary.     Hospital Course:   1/19: Pulm Consult:   - suggest consulting GI for esophageal dysmotility and IR for thoracocentesis to improve O2 requirements.   - obtained resp panel (neg), legionella, sputum culture pending.   - suggested continuing IV ceftri/doxy 10 day course, ok to switch to augmentin liquid if SLP approves.    GI consult:    -manometry is an outpatient procedure, follow up outpatient if patient is not willing to have a PEG tube placed at the moment.     ROS  Review of Systems   Constitutional:  Negative for chills and fever.   HENT:  Positive for sore throat.    Respiratory:  Positive for cough, hemoptysis, sputum production and shortness of breath.    Cardiovascular:  Negative for leg swelling.         OBJECTIVE:  PE:  Temp:  [36.1 °C (97 °F)-36.6 °C (97.8 °F)] 36.6 °C (97.8 °F)  Pulse:  [66-69] 68  Resp:  [14-18] 18  BP: (128-161)/(68-81) 135/68  SpO2:  [91 %-94 %] 92 %    Physical Exam  Constitutional:       Appearance: Normal appearance.   Cardiovascular:      Rate and Rhythm: Regular rhythm.   Pulmonary:      Comments: Minimal breath  "sounds bilaterally at the bases, R worse than L. Bilateral apical crackles.  Neurological:      Mental Status: He is alert.       LABS:  Recent Labs     01/18/25  0122 01/19/25  0007   WBC 8.8 7.5   RBC 3.46* 3.37*   HEMOGLOBIN 9.5* 9.4*   HEMATOCRIT 30.9* 29.8*   MCV 89.3 88.4   MCH 27.5 27.9   RDW 57.6* 56.2*   PLATELETCT 248 237   MPV 11.2 10.5     Recent Labs     01/18/25  0122 01/19/25  0007   SODIUM 137 137   POTASSIUM 4.0 4.3   CHLORIDE 100 100   CO2 27 28   BUN 21 24*   CREATININE 0.57 0.70   CALCIUM 9.3 9.2   ALBUMIN 3.2 3.1*     Estimated GFR/CRCL = Estimated Creatinine Clearance: 86.9 mL/min (by C-G formula based on SCr of 0.7 mg/dL).  Recent Labs     01/18/25  0122 01/19/25  0007   GLUCOSE 154* 260*     Recent Labs     01/18/25 0122 01/19/25  0007   ASTSGOT 23 25   ALTSGPT 63* 58*   TBILIRUBIN 0.2 0.2   ALKPHOSPHAT 121* 122*   GLOBULIN 3.0 2.8             No results for input(s): \"INR\", \"APTT\", \"FIBRINOGEN\" in the last 72 hours.    Invalid input(s): \"D-DIMER\"    Intake/Output Summary (Last 24 hours) at 1/20/2025 0749  Last data filed at 1/19/2025 1954  Gross per 24 hour   Intake 240 ml   Output --   Net 240 ml       MICROBIOLOGY:   No results found for: \"BLOODCULTU\", \"BLDCULT\", \"BCHOLD\"     IMAGING:   DX-CHEST-PORTABLE (1 VIEW)   Final Result   Addendum (preliminary) 1 of 1   Correction: The patient does not have a pneumothorax. The body of the    report should indicate no pneumothorax.      Final         1. Increasing moderate base atelectasis and small right effusion.                  DX-ESOPHAGUS - [id number]   Final Result      US-RUQ   Final Result         1.  Gallbladder wall thickening with edema, without visualized shadowing stones, consider acalculous cholecystitis. Could be further evaluated with HIDA scan as clinically appropriate   2.  Echogenic lesion near the hepatic dome, appearance suggesting hemangioma, otherwise indeterminate, could be followed up with three-phase CT liver for " further characterization.   3.  Dilatation the right renal pelvis, consider ex renal pelvis morphology or mild hydronephrosis   4.  Hepatomegaly   5.  Echogenic liver, compatible with fatty change versus fibrosis.      CT-CHEST,ABDOMEN,PELVIS WITH   Final Result         1.  Scattered bilateral pulmonary infiltrates   2.  Small right and trace left pleural effusions.   3.  Gallbladder wall thickening with possible pericholecystic fluid, could be further evaluated with right upper quadrant sonogram   4.  Right hepatic lobe low-density lesion, indeterminate, could be followed up with three-phase CT of the liver for further characterization.   5.  Mild mediastinal adenopathy, consider causes of adenopathy with additional workup as clinically appropriate   6.  Atherosclerosis and atherosclerotic coronary artery disease      DX-CHEST-PORTABLE (1 VIEW)   Final Result      1. Small bilateral pleural effusions and bibasal parenchymal consolidations, greater on the right than the left. Differential diagnosis includes atelectasis and/or pneumonia, with pulmonary edema favored less likely.   2. Atherosclerosis.          MEDS:  Current Facility-Administered Medications   Medication Last Admin    Respiratory Therapy Consult      doxycycline monohydrate (Adoxa) tablet 100 mg 100 mg at 01/20/25 0425    apixaban (Eliquis) tablet 5 mg 5 mg at 01/20/25 0425    atorvastatin (Lipitor) tablet 40 mg 40 mg at 01/20/25 0425    [Held by provider] carvedilol (Coreg) tablet 3.125 mg      [Held by provider] losartan (Cozaar) tablet 25 mg      [Held by provider] spironolactone (Aldactone) tablet 25 mg      sertraline (Zoloft) tablet 25 mg 25 mg at 01/20/25 0425    insulin GLARGINE (Lantus,Semglee) injection 13 Units at 01/19/25 1754    And    insulin lispro (HumaLOG,AdmeLOG) subcutaneous injection 3 Units at 01/19/25 2236    And    dextrose 50% (D50W) injection 25 g      senna-docusate (Pericolace Or Senokot S) 8.6-50 MG per tablet 2 Tablet 2  Tablet at 01/19/25 9980    And    polyethylene glycol/lytes (Miralax) Packet 1 Packet      dextrose 50% (D50W) injection 25 g      cefTRIAXone (Rocephin) syringe 1,000 mg 1,000 mg at 01/20/25 0425    diphenhydrAMINE (Benadryl) injection 25 mg           ASSESSMENT/PLAN: 74 y.o. male with past medical history of systolic CHF, diabetes, and paroxysmal afib was admitted for acute hypoxemic respiratory failure and possible pneumonia.   #Acute hypoxemic respiratory failure   At baseline, pt does not require O2, but while inpatient has been needing 3-5 L. Pt desats into 70-80s while eating, daughter notes that he would desat at PCP visits. CT chest shows RLL consolidation with pleural effusion - this is suspicious for a post viral pneumonia or aspiration pneumonia. As per pulmonology, they recommend consulting Gi for possible esophageal dysmotility, IR for thoracocentesis to help with O2 requirements. GI consult recommends follow-up EGD/manometry outpatient if pt not willing to have PEG tube at this time.    -Pulm:   -Continue IV Ceftriaxone/Doxy for 10 days, transition to augmentin liquid if SLP gives OK.   -   -Resp panel negative, legionella pending, sputum culture pending.   - IR: diagnostic and therapeutic thoracentesis today. Fluid labs and culture ordered  - GI follow up outpatient for manometry   - PT/OT: home health recommended for PT needs   - walk test before discharge     #Oropharyngeal/ Esophageal Dysphagia   SLP recommends pureed diet and thin liquids. Esophageal dysmotility may be causing aspiration events.    -Continue to follow speech recommendations   -Refer to GI for manometry     #Gallbladder wall thickening w/ pericholecystic fluid    Incidental finding, increased LFTs and alk phos. Normal bilirubin. No RUQ tenderness.    -Follow up outpatient with GI     #T2DM   Patient has poor control of diabetes. Currently on glargine 13 units with sliding scale insulin.    -Consult diabetes educator before  discharge      #Systolic CHF    As per cardiology, held GDMT for low pressures. Thoracocentesis will help with differential of pleural effusion.   -Monitor for lasix need   -strict I/os   - 2 L fluid restriction     #Paroxysmal Afib   -apixaban 5 mg BID       CODE STATUS  DISPOSITION  - keep inpatient tonight, potential d/c tomorrow.       Garth Choe, MS3  [facility] [employer]

## 2025-01-20 NOTE — PROGRESS NOTES
Engage by primary team for possible PEG tube placement.  Discussed with team we are willing to place a PEG tube for recurrent aspiration.    Per chart review patient is expressing concerns over feeding tube placement.  I have requested that primary team engaged the patient further and if PEG tube placement is requested to reengage GI.  Outpatient GI referral for manometry (this is not offered as an inpatient service) is also a reasonable approach.    We will hold off on consultation until further discussions have been had by the primary team.

## 2025-01-20 NOTE — CARE PLAN
The patient is Watcher - Medium risk of patient condition declining or worsening    Shift Goals  Clinical Goals: safety  Patient Goals: rest  Family Goals: not present    Progress made toward(s) clinical / shift goals:  Patient calls appropriately for needs, bed alarm on and call light in reach, frequent rounding in place. Nurse near door. Patient stable on ambulation, needing oxygen while walking and while sleeping.  on, oxygenation above 90 on 3L oxymask at night.     Patient is not progressing towards the following goals:

## 2025-01-20 NOTE — CARE PLAN
The patient is Stable - Low risk of patient condition declining or worsening    Shift Goals  Clinical Goals: safety  Patient Goals: rest  Family Goals: not present    Progress made toward(s) clinical / shift goals:  Thoracentesis today    Patient is not progressing towards the following goals:      Problem: Discharge Barriers/Planning  Goal: Patient's continuum of care needs are met  Outcome: Not Progressing     Problem: Respiratory  Goal: Patient will achieve/maintain optimum respiratory ventilation and gas exchange  Outcome: Not Progressing     Problem: Nutrition  Goal: Patient's nutritional and fluid intake will be adequate or improve  Outcome: Not Progressing  Goal: Enteral nutrition will be maintained or improve  Outcome: Not Progressing  Goal: Enteral nutrition will be maintained or improve  Outcome: Not Progressing     Problem: Self Care  Goal: Patient will have the ability to perform ADLs independently or with assistance (bathe, groom, dress, toilet and feed)  Outcome: Not Progressing     Problem: Fall Risk  Goal: Patient will remain free from falls  Outcome: Not Progressing

## 2025-01-20 NOTE — DISCHARGE PLANNING
"Case Management Discharge Planning    Admission Date: 1/15/2025  GMLOS: 4.6  ALOS: 5    6-Clicks ADL Score: 18  6-Clicks Mobility Score: 19      Anticipated Discharge Dispo: Discharge Disposition: D/T to home under [facility]HA care in anticipation of covered skilled care (06)    DME Needed: Yes    DME Ordered: No    Action(s) Taken: OTHER    Per MD, patient is not medically clear. SW requested DME order for home oxygen. MD is aware DME Order has to include \"POC\" as requested by Swapna, patient's daughter.     1:30pm - Per DPA, DME Order Received. DPA send referral to [facility].    Escalations Completed: None    Medically Clear: No    Next Steps: DPA to submit DME Referral when order received.    Barriers to Discharge: Medical clearance. DME    Is the patient up for discharge tomorrow:        "

## 2025-01-20 NOTE — PROGRESS NOTES
[facility]IC[facility] PROGRESS NOTE     Attending: Biju Bernard M.d.     Senior Resident: Daphney Ramos M.D.     Juventino Resident: Socrates Griffin M.D.     PATIENT: Rogelio Padilla; [id number]; 1950    ID: 74 y.o. male hospital day 6 admitted after a choking incident needing supplemental oxygen and possible pneumonia     SUBJECTIVE: Patient was able to wean down to 3 L of oxygen yesterday during the day.  Requiring 4 L overnight.  Back down to 3 L this morning. He otherwise states he feels fine and has no acute complaints. Denies fever or chills.    OBJECTIVE:     Vitals:    01/19/25 1959 01/20/25 0442 01/20/25 0850 01/20/25 0913   BP:  135/68  120/66   Pulse: 67 68  68   Resp: 18 18 16 15   Temp:  36.6 °C (97.8 °F)  36.6 °C (97.9 °F)   TempSrc:  Temporal  Temporal   SpO2: 92% 92% 93% 90%   Weight:       Height:             Intake/Output Summary (Last 24 hours) at 1/20/2025 1053  Last data filed at 1/19/2025 1954  Gross per 24 hour   Intake 240 ml   Output --   Net 240 ml            PE:   General: No acute distress, resting comfortably in bed.  HEENT: NC/AT. PERRLA. EOMI. MMM  Cardiovascular: Normal S1/S2, RRR, no m/r/g  Respiratory: Symmetric inspiratory effort. Minimal breath sounds at bilateral lung bases, right greater than left. Bilateral apical crackles and wheezes  Abdomen: BS+, soft, NT/ND   EXT:  BRICE, 5/5 strength, 2+ pulses, no rashes, bruising, or bleeding. Mild pitting edema   Neuro: Non focal with no numbness, tingling or changes in sensation    LABS:  Recent Labs     01/18/25  0122 01/19/25  0007 01/20/25  0836   WBC 8.8 7.5 7.7   RBC 3.46* 3.37* 3.67*   HEMOGLOBIN 9.5* 9.4* 10.4*   HEMATOCRIT 30.9* 29.8* 33.1*   MCV 89.3 88.4 90.2   MCH 27.5 27.9 28.3   RDW 57.6* 56.2* 57.1*   PLATELETCT 248 237 252   MPV 11.2 10.5 10.3     Recent Labs     01/18/25  0122 01/19/25  0007   SODIUM 137 137   POTASSIUM 4.0 4.3   CHLORIDE 100 100   CO2 27 28   BUN 21 24*   CREATININE 0.57 0.70   CALCIUM 9.3 9.2  "  ALBUMIN 3.2 3.1*     Estimated GFR/CRCL = Estimated Creatinine Clearance: 86.9 mL/min (by C-G formula based on SCr of 0.7 mg/dL).  Recent Labs     01/18/25  0122 01/19/25  0007   GLUCOSE 154* 260*     Recent Labs     01/18/25  0122 01/19/25  0007   ASTSGOT 23 25   ALTSGPT 63* 58*   TBILIRUBIN 0.2 0.2   ALKPHOSPHAT 121* 122*   GLOBULIN 3.0 2.8             No results for input(s): \"INR\", \"APTT\", \"FIBRINOGEN\" in the last 72 hours.    Invalid input(s): \"DIMER\"      MICROBIOLOGY:   Results       Procedure Component Value Units Date/Time    FLUID CULTURE W/GRAM STAIN [[id number]]     Order Status: No result Specimen: Body Fluid from Thoracentesis Fluid     AFB CULTURE [[id number]]     Order Status: No result Specimen: Body Fluid from Thoracentesis Fluid     FUNGAL CULTURE [[id number]]     Order Status: No result Specimen: Body Fluid from Thoracentesis Fluid     Respiratory Panel by PCR (Inpatient ONLY) [[id number]] Collected: 01/20/25 0048    Order Status: Completed Specimen: Respirate from Nasopharyngeal Updated: 01/20/25 0202     Adenovirus, PCR Not Detected     SARS-CoV-2 (COVID-19) RNA by DARINEL NotDetected     Comment: [facility] providers: PLEASE REFER TO DE-ESCALATION AND RETESTING PROTOCOL  on [email or web address]    **The [employer] Respiratory Panel 2.1 (RP2.1) RT-PCR test is FDA authorized.          Coronavirus 229E, PCR Not Detected     Coronavirus HKU1, PCR Not Detected     Coronavirus NL63, PCR Not Detected     Coronavirus OC43, PCR Not Detected     Human Metapneumovirus, PCR Not Detected     Rhino/Enterovirus, PCR Not Detected     Influenza A, PCR Not Detected     Influenza B, PCR Not Detected     Parainfluenza 1, PCR Not Detected     Parainfluenza 2, PCR Not Detected     Parainfluenza 3, PCR Not Detected     Parainfluenza 4, PCR Not Detected     RSV (Respiratory Syncytial Virus), PCR Not Detected     Bordetella parapertussis (JO5787), PCR Not Detected     Bordetella pertussis (ptxP), PCR Not Detected     Mycoplasma " pneumoniae, PCR Not Detected     Chlamydia pneumoniae, PCR Not Detected    MRSA By PCR (Amp) [[id number]] Collected: 01/19/25 1625    Order Status: Completed Specimen: Respirate from Nares Updated: 01/19/25 1937     MRSA by PCR Negative    CULTURE RESPIRATORY W/ GRM STN [[id number]]     Order Status: Completed Specimen: Respirate from Sputum     CoV-2, Flu A/B, And RSV by PCR (Cepheid) [[id number]] Collected: 01/15/25 2213    Order Status: Completed Specimen: Respirate from Nasopharyngeal Updated: 01/16/25 0011     Influenza virus A RNA Negative     Influenza virus B, PCR Negative     RSV, PCR Negative     SARS-CoV-2 by PCR NotDetected     Comment: [facility] providers: PLEASE REFER TO DE-ESCALATION AND RETESTING PROTOCOL  on [email or web address]    **The [employer] GeneXpert Xpress SARS-CoV-2 RT-PCR Test has been made  available for use under the Emergency Use Authorization (EUA) only.          SARS-CoV-2 Source NP Swab             IMAGING:   DX-CHEST-PORTABLE (1 VIEW)   Final Result   Addendum (preliminary) 1 of 1   Correction: The patient does not have a pneumothorax. The body of the    report should indicate no pneumothorax.      Final         1. Increasing moderate base atelectasis and small right effusion.                  DX-ESOPHAGUS - [id number]   Final Result      US-RUQ   Final Result         1.  Gallbladder wall thickening with edema, without visualized shadowing stones, consider acalculous cholecystitis. Could be further evaluated with HIDA scan as clinically appropriate   2.  Echogenic lesion near the hepatic dome, appearance suggesting hemangioma, otherwise indeterminate, could be followed up with three-phase CT liver for further characterization.   3.  Dilatation the right renal pelvis, consider ex renal pelvis morphology or mild hydronephrosis   4.  Hepatomegaly   5.  Echogenic liver, compatible with fatty change versus fibrosis.      CT-CHEST,ABDOMEN,PELVIS WITH   Final Result         1.  Scattered bilateral  pulmonary infiltrates   2.  Small right and trace left pleural effusions.   3.  Gallbladder wall thickening with possible pericholecystic fluid, could be further evaluated with right upper quadrant sonogram   4.  Right hepatic lobe low-density lesion, indeterminate, could be followed up with three-phase CT of the liver for further characterization.   5.  Mild mediastinal adenopathy, consider causes of adenopathy with additional workup as clinically appropriate   6.  Atherosclerosis and atherosclerotic coronary artery disease      DX-CHEST-PORTABLE (1 VIEW)   Final Result      1. Small bilateral pleural effusions and bibasal parenchymal consolidations, greater on the right than the left. Differential diagnosis includes atelectasis and/or pneumonia, with pulmonary edema favored less likely.   2. Atherosclerosis.      US-THORACENTESIS PUNCTURE RIGHT    (Results Pending)       MEDS:  Current Facility-Administered Medications   Medication Last Admin    Respiratory Therapy Consult      doxycycline monohydrate (Adoxa) tablet 100 mg 100 mg at 01/20/25 0425    apixaban (Eliquis) tablet 5 mg 5 mg at 01/20/25 0425    atorvastatin (Lipitor) tablet 40 mg 40 mg at 01/20/25 0425    [Held by provider] carvedilol (Coreg) tablet 3.125 mg      [Held by provider] losartan (Cozaar) tablet 25 mg      [Held by provider] spironolactone (Aldactone) tablet 25 mg      sertraline (Zoloft) tablet 25 mg 25 mg at 01/20/25 0425    insulin GLARGINE (Lantus,Semglee) injection 13 Units at 01/19/25 1754    And    insulin lispro (HumaLOG,AdmeLOG) subcutaneous injection 3 Units at 01/19/25 2236    And    dextrose 50% (D50W) injection 25 g      senna-docusate (Pericolace Or Senokot S) 8.6-50 MG per tablet 2 Tablet 2 Tablet at 01/19/25 2240    And    polyethylene glycol/lytes (Miralax) Packet 1 Packet      dextrose 50% (D50W) injection 25 g      cefTRIAXone (Rocephin) syringe 1,000 mg 1,000 mg at 01/20/25 0425    diphenhydrAMINE (Benadryl) injection 25 mg          ASSESSMENT/PLAN: Rogelio Padilla is a 74 y.o. male admitted with:    * Acute respiratory failure (HCC)- (present on admission)  Assessment & Plan  #pleural effusion  #Pulmonary infiltrates   Choking event while eating dinner on 1/15/2025.  Recently became adentulous.  High risk for aspiration.  No O2 demand at baseline, hovering between 3 to 5 L while inpatient.  Per nursing, daughter reports patient would routinely desat to the 80s and 70s at PCP visits.  Significant desats into the 80s-70s while eating meals here.  Recently recovering from viral illness and given his leukocytosis and CT chest demonstrating pulmonary infiltrates and pleural effusion there is concern for post viral pneumonia process. Patient has failed to improve so far remains on 5 L oxygen. Chest x-ray 1/19 showing increasing moderate right base atelectasis and small right pleural effusion, mild left base atelectasis remains the same.  Consulted Dr. Amaro, pulmonology.  He is recommending consulting GI for possible esophageal dysmotility, and IR for thoracentesis, which may help with his ongoing oxygen requirements.  Ceftriaxone and doxycycline adequate coverage for aspiration pneumonia, he suggests total course of 10 days of antibiotics, can transition to Augmentin if patient is able to tolerate orally.  Respiratory panel negative.  Dr. Little, GI, states PEG tube placement may be appropriate if patient is willing, given recurrent aspiration.  Patient does not want this at this time.  IR consulted for thoracentesis.    Pulmonology recs:   Continue IV ceftriaxone and doxycycline x 10 days total  Consider transition to oral Augmentin when appropriate  Legionella and strep antigens pending.  Respiratory culture pending  Trend procalcitonin every 48 hours  IR: Diagnostic and therapeutic thoracentesis ordered.  Fluid labs and culture ordered.  GI referral outpatient for manometry.  Titrate oxygen as tolerated   PT/OT recommending home health  for continued PT needs, referral sent  [facility] [place] ordered        Depression  Assessment & Plan  History of depression on zoloft 25mg which will be continued inpatient.     Thickening of wall of gallbladder with pericholecystic fluid- (present on admission)  Assessment & Plan  Incidental finding on CT chest, abdomen, pelvis.  LFTs mildly elevated as is alkaline phosphatase as AST/ALT 69/116.  Alkaline phosphatase 147.  Bilirubin within normal limits.  Exam without tenderness in the right upper quadrant.  Patient is currently on ceftriaxone which can impact and contribute to the formation of biliary sludge. RUQ ultrasound completed with no gallstones noted but wall thickening and possible hemangioma. Patient remains asymptomatic.    Referral to GI outpatient     Anemia- (present on admission)  Assessment & Plan  Asymptomatic and incidental finding.  MCV within normal limits.  Continue to monitor.    Oropharyngeal dysphagia- (present on admission)  Assessment & Plan  Patient presenting following choking on food leading to syncopal episode and hypoxia.  Speech eval and swallow study completed 1/16/25 with recommendations of minced soft foods and therapies.  Patient continued to desat while eating.  SLP reevaluated and has downgraded patient to puréed diet and thin liquids.  Pulmonology is concern for esophageal dysmotility causing aspiration events.  Patient declines PEG tube at this time.    Puréed diet with thin liquids  Continue to follow speech recommendations   GI referral for manometry    Type 2 diabetes mellitus, with long-term current use of insulin (HCC)- (present on admission)  Assessment & Plan  Patient with poorly controlled, A1c in September 2024 was 9.3.  Patient taking Lantus 13 units nightly, no other antiglycemic medications as they were discontinued when he was having hypotensive episodes.  Blood sugar markedly elevated on presentation at 438.  We will update an A1c of 11.1%.  Blood sugars steadily  improving while inpatient.    Glargine 13 U   Adjust if patient becomes NPO again to lower dose   Adjust per daily glucose trends   Sliding scale, low correctional   POCT glucose   Hypoglycemic protocol       Pleural effusion- (present on admission)  Assessment & Plan  Please see assessment and plan under hypoxia    Chronic systolic congestive heart failure (HCC)- (present on admission)  Assessment & Plan  Chronic, diagnosed in [place] prior to moving to [facility] in 2024.  Echo in October 24 with no comparison available show an ejection fraction of 55 to 60%.  He follows with cardiology at [facility] who notes systolic congestive heart failure.  He is on Coreg 3.125 mg twice daily, losartan 25 mg, spironolactone 25 mg per chart review but is not taking these; no SGLT2 inhibitor but this could be a good addition given his poorly controlled diabetes. His GDMT medications have been stopped by cardiology due to low blood pressures.  Evidence of mild fluid overload on exam with 1+ pitting edema to the mid shins.  Also has pleural effusions which could be due to heart failure versus pulmonary infection versus flash pulmonary edema after choking event.    Monitor for need for lasix   Continue to hold coreg 3.125 mg, losartan 25 mg and spironolactone 25 mg per notes with cardiology, he has follow-up on 1/23/25 and can discuss then whether or not to restart and consider SGLT2 inhibitor   Strict I/O's   2L fluid restriction     Dyslipidemia- (present on admission)  Assessment & Plan  Chronic.  On atorvastatin 40 mg daily which will be continued while in the hospital    Primary hypertension- (present on admission)  Assessment & Plan  Chronic.  Stable.  Blood pressures on admission ranging 126-140/64-87.  He is receiving Lasix for his pleural effusion and pulmonary edema.  Also has co-occurring heart failure for which he receives carvedilol and spironolactone but has not been taking this since having hypotensive episodes.  Continue  to hold his losartan 25 mg daily and continue to monitor his blood pressure while patient      Monitor blood pressure     Paroxysmal atrial fibrillation (HCC)- (present on admission)  Assessment & Plan  Chronic.  Was not in A-fib on admission.  Is on apixaban 5 mg twice daily which will be continued while in the hospital.      Continue apixaban 5mg BID         Core Measures:  Fluids: None   Lines: PIV   Abx: C3 and doxycycline   Diet: Puréed and thin liquids  PPX: Apixaban   DISPO: Inpatient management for IV abx and oxygen supplementation. Patient is not cleared for discharge       CODE STATUS: FULL     Socrates Griffin M.D.   PGY1  [facility]

## 2025-01-20 NOTE — DISCHARGE PLANNING
Choice for HH obtaned, [facility]-he has had them before; O2 choice obtained,  ! [facility]  2 Preferred  3 Jj  forms given to DPA.

## 2025-01-20 NOTE — FACE TO FACE
"Face to Face Note  -  Durable Medical Equipment    Socrates Griffin M.D. - NPI: [id number]  I certify that this patient is under my care and that they have had a durable medical equipment(DME)face to face encounter by myself that meets the physician DME face-to-face encounter requirements with this patient on:    Date of encounter:   Patient:                    MRN:                       YOB: 2023  Rogelio Padilla  [id number]  1950     The encounter with the patient was in whole, or in part, for the following medical condition, which is the primary reason for durable medical equipment:  Other - Respiratory failure    I certify that, based on my findings, the following durable medical equipment is medically necessary:  Oxygen   HOME O2 Saturation Measurements:(Values must be present for Home Oxygen orders)  Room air sat at rest: 80  Room air sat with amb:  (did not attempt)  With liters of O2: 5, O2 sat at rest with O2: 95  With Liters of O2: 5, O2 sat with amb with O2 : 91  Is the patient mobile?: Yes  If patient feels more short of breath, they can go up to 6 liters per minute and contact healthcare provider.    Supporting Symptoms: The patient requires supplemental oxygen, as the following interventions have been tried with limited or no improvement: \"Bronchodilators and/or steroid inhalers, \"Ambulation with oximetry, and \"Incentive spirometry.        ------------------------------------------------------------------------------------------------------------------    Face to Face Supporting Documentation - Home Health    The encounter with this patient was in whole or in part the primary reason for home health admission.    Date of encounter:   Patient:                    MRN:                       YOB: 2023  Rogelio Padilla  [id number]  1950     Home health to see patient for:  Physical Therapy evaluation and treatment and Occupational therapy " evaluation and treatment    Skilled need for:  Recent Deterioration of Health Status Respiratory failure requiring supplemental oxygen and deconditioning    Skilled nursing interventions to include:  Comment: Physical therapy    Homebound evidenced status by:  Needs the assistance of another person in order to leave the home. Leaving home must require a considerable and taxing effort. There must exist a normal inability to leave the home.    Community Physician to provide follow up care: Narciso Rose M.D.     Optional Interventions    Wound information & treatment:    Home Infusion Therapy orders:    Line/Drain/Airway:    I certify the face to face encounter for this home care referral meets the CMS requirements and the encounter/clinical assessment with the patient was, in whole, or in part, for the medical condition(s) listed above, which is the primary reason for home health care. Based on my clinical findings: the service(s) are medically necessary, support the need for home health care, and the homebound criteria are met.  I certify that this patient has had a face to face encounter by myself.  Socrates Griffin M.D. - NPI: [id number]    *Debility, frailty and advanced age in the absence of an acute deterioration or exacerbation of a condition do not qualify a patient for home health.

## 2025-01-20 NOTE — DISCHARGE PLANNING
Referral sent to [facility]    1425- Per request at [facility], BRITANY faxed referral to [phone]- Attn Emmy  5298- Faxed to [phone]- Per Emmy, having a problem receiving faxes. Emmy mentioned he will come by S5 office and  referral himself. BRITANY left referral on desk top.

## 2025-01-20 NOTE — DISCHARGE PLANNING
Referral sent to [facility]    0940-  and DME choice scanned into media    0954- Per epic link, [facility] accepted

## 2025-01-21 ENCOUNTER — APPOINTMENT (OUTPATIENT)
Dept: RADIOLOGY | Facility: MEDICAL CENTER | Age: 75
DRG: 177 | End: 2025-01-21
Payer: MEDICARE

## 2025-01-21 LAB
ERYTHROCYTE [DISTWIDTH] IN BLOOD BY AUTOMATED COUNT: 55.2 FL (ref 35.9–50)
GLUCOSE BLD STRIP.AUTO-MCNC: 155 MG/DL (ref 65–99)
GLUCOSE BLD STRIP.AUTO-MCNC: 164 MG/DL (ref 65–99)
GLUCOSE BLD STRIP.AUTO-MCNC: 216 MG/DL (ref 65–99)
GLUCOSE BLD STRIP.AUTO-MCNC: 78 MG/DL (ref 65–99)
HCT VFR BLD AUTO: 32.8 % (ref 42–52)
HGB BLD-MCNC: 10.2 G/DL (ref 14–18)
L PNEUMO AG UR QL IA: NEGATIVE
MCH RBC QN AUTO: 27.6 PG (ref 27–33)
MCHC RBC AUTO-ENTMCNC: 31.1 G/DL (ref 32.3–36.5)
MCV RBC AUTO: 88.9 FL (ref 81.4–97.8)
PLATELET # BLD AUTO: 252 K/UL (ref 164–446)
PMV BLD AUTO: 10.7 FL (ref 9–12.9)
RBC # BLD AUTO: 3.69 M/UL (ref 4.7–6.1)
S PNEUM AG UR QL: NEGATIVE
WBC # BLD AUTO: 6.4 K/UL (ref 4.8–10.8)

## 2025-01-21 PROCEDURE — 700111 HCHG RX REV CODE 636 W/ 250 OVERRIDE (IP): Performed by: INTERNAL MEDICINE

## 2025-01-21 PROCEDURE — 700102 HCHG RX REV CODE 250 W/ 637 OVERRIDE(OP): Performed by: INTERNAL MEDICINE

## 2025-01-21 PROCEDURE — 700102 HCHG RX REV CODE 250 W/ 637 OVERRIDE(OP)

## 2025-01-21 PROCEDURE — 700105 HCHG RX REV CODE 258: Performed by: INTERNAL MEDICINE

## 2025-01-21 PROCEDURE — A9270 NON-COVERED ITEM OR SERVICE: HCPCS

## 2025-01-21 PROCEDURE — 82962 GLUCOSE BLOOD TEST: CPT | Mod: 91

## 2025-01-21 PROCEDURE — 99232 SBSQ HOSP IP/OBS MODERATE 35: CPT | Mod: GC | Performed by: STUDENT IN AN ORGANIZED HEALTH CARE EDUCATION/TRAINING PROGRAM

## 2025-01-21 PROCEDURE — A9270 NON-COVERED ITEM OR SERVICE: HCPCS | Performed by: INTERNAL MEDICINE

## 2025-01-21 PROCEDURE — 76604 US EXAM CHEST: CPT

## 2025-01-21 PROCEDURE — 85027 COMPLETE CBC AUTOMATED: CPT

## 2025-01-21 PROCEDURE — 770006 HCHG ROOM/CARE - MED/SURG/GYN SEMI*

## 2025-01-21 PROCEDURE — 92526 ORAL FUNCTION THERAPY: CPT

## 2025-01-21 RX ORDER — INSULIN LISPRO 100 [IU]/ML
2-12 INJECTION, SOLUTION INTRAVENOUS; SUBCUTANEOUS
Status: DISCONTINUED | OUTPATIENT
Start: 2025-01-21 | End: 2025-01-22 | Stop reason: HOSPADM

## 2025-01-21 RX ORDER — AMOXICILLIN AND CLAVULANATE POTASSIUM 400; 57 MG/5ML; MG/5ML
880 POWDER, FOR SUSPENSION ORAL EVERY 12 HOURS
Status: DISCONTINUED | OUTPATIENT
Start: 2025-01-21 | End: 2025-01-22 | Stop reason: HOSPADM

## 2025-01-21 RX ORDER — DEXTROSE MONOHYDRATE 25 G/50ML
25 INJECTION, SOLUTION INTRAVENOUS
Status: DISCONTINUED | OUTPATIENT
Start: 2025-01-21 | End: 2025-01-22 | Stop reason: HOSPADM

## 2025-01-21 RX ADMIN — DOXYCYCLINE 100 MG: 100 TABLET, FILM COATED ORAL at 05:36

## 2025-01-21 RX ADMIN — APIXABAN 5 MG: 5 TABLET, FILM COATED ORAL at 05:36

## 2025-01-21 RX ADMIN — INSULIN LISPRO 4 UNITS: 100 INJECTION, SOLUTION INTRAVENOUS; SUBCUTANEOUS at 18:01

## 2025-01-21 RX ADMIN — SERTRALINE HYDROCHLORIDE 25 MG: 50 TABLET ORAL at 05:36

## 2025-01-21 RX ADMIN — AMOXICILLIN AND CLAVULANATE POTASSIUM 880 MG OF AMOXICILLIN: 400; 57 POWDER, FOR SUSPENSION ORAL at 18:07

## 2025-01-21 RX ADMIN — DOXYCYCLINE 100 MG: 100 TABLET, FILM COATED ORAL at 18:07

## 2025-01-21 RX ADMIN — INSULIN LISPRO 2 UNITS: 100 INJECTION, SOLUTION INTRAVENOUS; SUBCUTANEOUS at 13:17

## 2025-01-21 RX ADMIN — ATORVASTATIN CALCIUM 40 MG: 40 TABLET, FILM COATED ORAL at 05:36

## 2025-01-21 RX ADMIN — INSULIN LISPRO 2 UNITS: 100 INJECTION, SOLUTION INTRAVENOUS; SUBCUTANEOUS at 23:23

## 2025-01-21 RX ADMIN — CEFTRIAXONE SODIUM 1000 MG: 10 INJECTION, POWDER, FOR SOLUTION INTRAVENOUS at 05:36

## 2025-01-21 RX ADMIN — APIXABAN 5 MG: 5 TABLET, FILM COATED ORAL at 18:07

## 2025-01-21 SDOH — ECONOMIC STABILITY: TRANSPORTATION INSECURITY
IN THE PAST 12 MONTHS, HAS THE LACK OF TRANSPORTATION KEPT YOU FROM MEDICAL APPOINTMENTS OR FROM GETTING MEDICATIONS?: NO

## 2025-01-21 SDOH — ECONOMIC STABILITY: TRANSPORTATION INSECURITY
IN THE PAST 12 MONTHS, HAS LACK OF RELIABLE TRANSPORTATION KEPT YOU FROM MEDICAL APPOINTMENTS, MEETINGS, WORK OR FROM GETTING THINGS NEEDED FOR DAILY LIVING?: NO

## 2025-01-21 ASSESSMENT — LIFESTYLE VARIABLES
EVER FELT BAD OR GUILTY ABOUT YOUR DRINKING: NO
EVER HAD A DRINK FIRST THING IN THE MORNING TO STEADY YOUR NERVES TO GET RID OF A HANGOVER: NO
CONSUMPTION TOTAL: NEGATIVE
HOW MANY TIMES IN THE PAST YEAR HAVE YOU HAD 5 OR MORE DRINKS IN A DAY: 0
HAVE PEOPLE ANNOYED YOU BY CRITICIZING YOUR DRINKING: NO
ALCOHOL_USE: NO
DOES PATIENT WANT TO STOP DRINKING: NO
TOTAL SCORE: 0
AVERAGE NUMBER OF DAYS PER WEEK YOU HAVE A DRINK CONTAINING ALCOHOL: 0
TOTAL SCORE: 0
ON A TYPICAL DAY WHEN YOU DRINK ALCOHOL HOW MANY DRINKS DO YOU HAVE: 0
TOTAL SCORE: 0
REASON UNABLE TO ASSESS: 0
HAVE YOU EVER FELT YOU SHOULD CUT DOWN ON YOUR DRINKING: NO

## 2025-01-21 ASSESSMENT — SOCIAL DETERMINANTS OF HEALTH (SDOH)
WITHIN THE LAST YEAR, HAVE TO BEEN RAPED OR FORCED TO HAVE ANY KIND OF SEXUAL ACTIVITY BY YOUR PARTNER OR EX-PARTNER?: NO
IN THE PAST 12 MONTHS, HAS THE ELECTRIC, GAS, OIL, OR WATER COMPANY THREATENED TO SHUT OFF SERVICE IN YOUR HOME?: YES
WITHIN THE LAST YEAR, HAVE YOU BEEN AFRAID OF YOUR PARTNER OR EX-PARTNER?: NO
WITHIN THE PAST 12 MONTHS, YOU WORRIED THAT YOUR FOOD WOULD RUN OUT BEFORE YOU GOT THE MONEY TO BUY MORE: NEVER TRUE
WITHIN THE LAST YEAR, HAVE YOU BEEN KICKED, HIT, SLAPPED, OR OTHERWISE PHYSICALLY HURT BY YOUR PARTNER OR EX-PARTNER?: NO
WITHIN THE LAST YEAR, HAVE YOU BEEN HUMILIATED OR EMOTIONALLY ABUSED IN OTHER WAYS BY YOUR PARTNER OR EX-PARTNER?: NO
WITHIN THE PAST 12 MONTHS, THE FOOD YOU BOUGHT JUST DIDN'T LAST AND YOU DIDN'T HAVE MONEY TO GET MORE: NEVER TRUE

## 2025-01-21 ASSESSMENT — PAIN DESCRIPTION - PAIN TYPE
TYPE: ACUTE PAIN

## 2025-01-21 NOTE — DISCHARGE PLANNING
Spoke To: Emmy  Agency/Facility Name: [facility]   Plan or Request:  He will bring a script/form to have the  Sign for the POC.    1030- [facility] form faxed- attn- Emmy

## 2025-01-21 NOTE — CARE PLAN
The patient is Stable - Low risk of patient condition declining or worsening    Shift Goals  Clinical Goals: Pt's O2 will remain above 90% this shift  Patient Goals: sleep  Family Goals: VALENTINA    Progress made toward(s) clinical / shift goals:   Pt's breathing remained stable with RRs between 16-20, SPO2 levels were monitored this shift and maintained above 90% with the use of O2 at 5 L oxy mask while sleeping.     Problem: Respiratory  Goal: Patient will achieve/maintain optimum respiratory ventilation and gas exchange  Outcome: Progressing     Patient is not progressing towards the following goals:

## 2025-01-21 NOTE — CARE PLAN
The patient is Stable - Low risk of patient condition declining or worsening    Shift Goals  Clinical Goals: maintain 02 above 90%, thora pending,  Patient Goals: sleep  Family Goals: VALENTINA    Progress made toward(s) clinical / shift goals:        Problem: Knowledge Deficit - Standard  Goal: Patient and family/care givers will demonstrate understanding of plan of care, disease process/condition, diagnostic tests and medications  Outcome: Progressing    Patient is not progressing towards the following goals:    Problem: Respiratory  Goal: Patient will achieve/maintain optimum respiratory ventilation and gas exchange  Outcome: Not Met

## 2025-01-21 NOTE — PROGRESS NOTES
[facility] [place] [facility] to the [facility] PROGRESS NOTE     Attending: Biju Bernard M.d.     Senior Resident: Daphney Ramos M.D.     Juventino Resident: Socrates Griffin M.D.     PATIENT: Rogelio Padilla; [id number]; 1950    ID: 74 y.o. male hospital day 7 admitted after a choking incident needing supplemental oxygen and possible pneumonia     SUBJECTIVE: Patient back up to 5 L overnight. Back down to 3 L this morning. He otherwise states he feels fine and has no acute complaints. Denies fever or chills.    OBJECTIVE:     Vitals:    01/21/25 0400 01/21/25 0431 01/21/25 0823 01/21/25 0905   BP:  139/81 121/60    Pulse:  66 65    Resp: 16 18 16    Temp:  35.8 °C (96.5 °F) 36.3 °C (97.3 °F)    TempSrc:  Temporal Temporal    SpO2:  95% 95% 92%   Weight:       Height:             Intake/Output Summary (Last 24 hours) at 1/21/2025 1323  Last data filed at 1/21/2025 0536  Gross per 24 hour   Intake 740 ml   Output 750 ml   Net -10 ml            PE:   General: No acute distress, resting comfortably in bed.  HEENT: NC/AT. PERRLA. EOMI. MMM  Cardiovascular: Normal S1/S2, RRR, no m/r/g  Respiratory: Symmetric inspiratory effort. Minimal breath sounds at bilateral lung bases, right greater than left. Bilateral apical crackles and wheezes, improving  Abdomen: BS+, soft, NT/ND   EXT:  BRICE, 5/5 strength, 2+ pulses, no rashes, bruising, or bleeding. Mild pitting edema   Neuro: Non focal with no numbness, tingling or changes in sensation    LABS:  Recent Labs     01/19/25  0007 01/20/25  0836 01/21/25  0028   WBC 7.5 7.7 6.4   RBC 3.37* 3.67* 3.69*   HEMOGLOBIN 9.4* 10.4* 10.2*   HEMATOCRIT 29.8* 33.1* 32.8*   MCV 88.4 90.2 88.9   MCH 27.9 28.3 27.6   RDW 56.2* 57.1* 55.2*   PLATELETCT 237 252 252   MPV 10.5 10.3 10.7     Recent Labs     01/19/25  0007   SODIUM 137   POTASSIUM 4.3   CHLORIDE 100   CO2 28   BUN 24*   CREATININE 0.70   CALCIUM 9.2   ALBUMIN 3.1*     Estimated GFR/CRCL = Estimated Creatinine Clearance: 86.9 mL/min (by C-G formula  "based on SCr of 0.7 mg/dL).  Recent Labs     01/19/25  0007   GLUCOSE 260*     Recent Labs     01/19/25  0007   ASTSGOT 25   ALTSGPT 58*   TBILIRUBIN 0.2   ALKPHOSPHAT 122*   GLOBULIN 2.8             No results for input(s): \"INR\", \"APTT\", \"FIBRINOGEN\" in the last 72 hours.    Invalid input(s): \"DIMER\"      MICROBIOLOGY:   Results       Procedure Component Value Units Date/Time    FLUID CULTURE W/GRAM STAIN [[id number]]     Order Status: No result Specimen: Body Fluid from Thoracentesis Fluid     AFB CULTURE [[id number]]     Order Status: No result Specimen: Body Fluid from Thoracentesis Fluid     FUNGAL CULTURE [[id number]]     Order Status: No result Specimen: Body Fluid from Thoracentesis Fluid     Respiratory Panel by PCR (Inpatient ONLY) [[id number]] Collected: 01/20/25 0048    Order Status: Completed Specimen: Respirate from Nasopharyngeal Updated: 01/20/25 0202     Adenovirus, PCR Not Detected     SARS-CoV-2 (COVID-19) RNA by DARINEL NotDetected     Comment: [facility] providers: PLEASE REFER TO DE-ESCALATION AND RETESTING PROTOCOL  on [email or web address]    **The [employer] Respiratory Panel 2.1 (RP2.1) RT-PCR test is FDA authorized.          Coronavirus 229E, PCR Not Detected     Coronavirus HKU1, PCR Not Detected     Coronavirus NL63, PCR Not Detected     Coronavirus OC43, PCR Not Detected     Human Metapneumovirus, PCR Not Detected     Rhino/Enterovirus, PCR Not Detected     Influenza A, PCR Not Detected     Influenza B, PCR Not Detected     Parainfluenza 1, PCR Not Detected     Parainfluenza 2, PCR Not Detected     Parainfluenza 3, PCR Not Detected     Parainfluenza 4, PCR Not Detected     RSV (Respiratory Syncytial Virus), PCR Not Detected     Bordetella parapertussis ([id number]), PCR Not Detected     Bordetella pertussis (ptxP), PCR Not Detected     Mycoplasma pneumoniae, PCR Not Detected     Chlamydia pneumoniae, PCR Not Detected    MRSA By PCR (Amp) [[id number]] Collected: 01/19/25 1625    Order Status: Completed " Specimen: Respirate from Nares Updated: 01/19/25 1937     MRSA by PCR Negative    CULTURE RESPIRATORY W/ GRM STN [[id number]]     Order Status: Completed Specimen: Respirate from Sputum     CoV-2, Flu A/B, And RSV by PCR ([facility]) [[id number]] Collected: 01/15/25 2213    Order Status: Completed Specimen: Respirate from Nasopharyngeal Updated: 01/16/25 0011     Influenza virus A RNA Negative     Influenza virus B, PCR Negative     RSV, PCR Negative     SARS-CoV-2 by PCR NotDetected     Comment: [facility] providers: PLEASE REFER TO DE-ESCALATION AND RETESTING PROTOCOL  on [email or web address]    **The [employer] Software GeneXpert Xpress SARS-CoV-2 RT-PCR Test has been made  available for use under the Emergency Use Authorization (EUA) only.          SARS-CoV-2 Source NP Swab             IMAGING:   DX-CHEST-PORTABLE (1 VIEW)   Final Result   Addendum (preliminary) 1 of 1   Correction: The patient does not have a pneumothorax. The body of the    report should indicate no pneumothorax.      Final         1. Increasing moderate base atelectasis and small right effusion.                  DX-ESOPHAGUS - [id number]   Final Result      US-RUQ   Final Result         1.  Gallbladder wall thickening with edema, without visualized shadowing stones, consider acalculous cholecystitis. Could be further evaluated with HIDA scan as clinically appropriate   2.  Echogenic lesion near the hepatic dome, appearance suggesting hemangioma, otherwise indeterminate, could be followed up with three-phase CT liver for further characterization.   3.  Dilatation the right renal pelvis, consider ex renal pelvis morphology or mild hydronephrosis   4.  Hepatomegaly   5.  Echogenic liver, compatible with fatty change versus fibrosis.      CT-CHEST,ABDOMEN,PELVIS WITH   Final Result         1.  Scattered bilateral pulmonary infiltrates   2.  Small right and trace left pleural effusions.   3.  Gallbladder wall thickening with possible pericholecystic fluid, could be  further evaluated with right upper quadrant sonogram   4.  Right hepatic lobe low-density lesion, indeterminate, could be followed up with three-phase CT of the liver for further characterization.   5.  Mild mediastinal adenopathy, consider causes of adenopathy with additional workup as clinically appropriate   6.  Atherosclerosis and atherosclerotic coronary artery disease      DX-CHEST-PORTABLE (1 VIEW)   Final Result      1. Small bilateral pleural effusions and bibasal parenchymal consolidations, greater on the right than the left. Differential diagnosis includes atelectasis and/or pneumonia, with pulmonary edema favored less likely.   2. Atherosclerosis.      US-THORACENTESIS PUNCTURE RIGHT    (Results Pending)       MEDS:  Current Facility-Administered Medications   Medication Last Admin    insulin GLARGINE (Lantus,Semglee) injection      And    insulin lispro (HumaLOG,AdmeLOG) subcutaneous injection 2 Units at 01/21/25 1317    And    dextrose 50% (D50W) injection 25 g      Respiratory Therapy Consult      doxycycline monohydrate (Adoxa) tablet 100 mg 100 mg at 01/21/25 0536    apixaban (Eliquis) tablet 5 mg 5 mg at 01/21/25 0536    atorvastatin (Lipitor) tablet 40 mg 40 mg at 01/21/25 0536    [Held by provider] carvedilol (Coreg) tablet 3.125 mg      [Held by provider] losartan (Cozaar) tablet 25 mg      [Held by provider] spironolactone (Aldactone) tablet 25 mg      sertraline (Zoloft) tablet 25 mg 25 mg at 01/21/25 0536    senna-docusate (Pericolace Or Senokot S) 8.6-50 MG per tablet 2 Tablet 2 Tablet at 01/19/25 2240    And    polyethylene glycol/lytes (Miralax) Packet 1 Packet      cefTRIAXone (Rocephin) syringe 1,000 mg 1,000 mg at 01/21/25 0536    diphenhydrAMINE (Benadryl) injection 25 mg         ASSESSMENT/PLAN: Rogelio Padilla is a 74 y.o. male admitted with:    * Acute respiratory failure (HCC)- (present on admission)  Assessment & Plan  #pleural effusion  #Pulmonary infiltrates   Choking event while  eating dinner on 1/15/2025.  Recently became adentulous.  High risk for aspiration.  No O2 demand at baseline, hovering between 3 to 5 L while inpatient.  Per nursing, daughter reports patient would routinely desat to the 80s and 70s at PCP visits.  Significant desats into the 80s-70s while eating meals here.  Recently recovering from viral illness and given his leukocytosis and CT chest demonstrating pulmonary infiltrates and pleural effusion there is concern for post viral pneumonia process. Patient has failed to improve so far remains on 5 L oxygen. Chest x-ray 1/19 showing increasing moderate right base atelectasis and small right pleural effusion, mild left base atelectasis remains the same.  Consulted Dr. Amaro, pulmonology.  He is recommending consulting GI for possible esophageal dysmotility, and IR for thoracentesis, which may help with his ongoing oxygen requirements.  Ceftriaxone and doxycycline adequate coverage for aspiration pneumonia, he suggests total course of 10 days of antibiotics, can transition to Augmentin if patient is able to tolerate orally.  Respiratory panel negative.  Dr. Little, GI, states PEG tube placement may be appropriate if patient is willing, given recurrent aspiration.  Patient does not want this at this time.  IR consulted for thoracentesis.    Pulmonology recs:   Continue IV ceftriaxone and doxycycline x 10 days total  Consider transition to oral Augmentin when appropriate  Legionella and strep antigens pending.  Respiratory culture pending  Trend procalcitonin every 48 hours  IR: Diagnostic and therapeutic thoracentesis ordered.  Fluid labs and culture ordered.  Pending.  GI referral outpatient for manometry.  Titrate oxygen as tolerated   PT/OT recommending home health for continued PT needs, referral sent  DME oxygen ordered, pending approval        Depression  Assessment & Plan  History of depression on zoloft 25mg which will be continued inpatient.     Thickening of wall  of gallbladder with pericholecystic fluid- (present on admission)  Assessment & Plan  Incidental finding on CT chest, abdomen, pelvis.  LFTs mildly elevated as is alkaline phosphatase as AST/ALT 69/116.  Alkaline phosphatase 147.  Bilirubin within normal limits.  Exam without tenderness in the right upper quadrant.  Patient is currently on ceftriaxone which can impact and contribute to the formation of biliary sludge. RUQ ultrasound completed with no gallstones noted but wall thickening and possible hemangioma. Patient remains asymptomatic.    Referral to GI outpatient     Anemia- (present on admission)  Assessment & Plan  Asymptomatic and incidental finding.  MCV within normal limits.  Continue to monitor.    Oropharyngeal dysphagia- (present on admission)  Assessment & Plan  Patient presenting following choking on food leading to syncopal episode and hypoxia.  Speech eval and swallow study completed 1/16/25 with recommendations of minced soft foods and therapies.  Patient continued to desat while eating.  SLP reevaluated and has downgraded patient to puréed diet and thin liquids.  Pulmonology is concern for esophageal dysmotility causing aspiration events.  Patient declines PEG tube at this time.    Puréed diet with thin liquids  Continue to follow speech recommendations   GI referral for manometry    Type 2 diabetes mellitus, with long-term current use of insulin ([facility])- (present on admission)  Assessment & Plan  Patient with poorly controlled, A1c in September 2024 was 9.3.  Patient taking Lantus 13 units nightly, no other antiglycemic medications as they were discontinued when he was having hypotensive episodes.  Blood sugar markedly elevated on presentation at 438.  We will update an A1c of 11.1%.  Blood sugars steadily improving while inpatient.  Patient blood glucose shown to be consistently low in the 70s to 80s after basal insulin.  During the day blood glucose elevated in the 200s.  Will readjust  insulin.    Decrease glargine 10 U   Adjust if patient becomes NPO again to lower dose   Adjust per daily glucose trends   Increased correctional  POCT glucose   Hypoglycemic protocol       Pleural effusion- (present on admission)  Assessment & Plan  Please see assessment and plan under hypoxia    Chronic systolic congestive heart failure (HCC)- (present on admission)  Assessment & Plan  Chronic, diagnosed in [place] prior to moving to [facility] in 2024.  Echo in October 24 with no comparison available show an ejection fraction of 55 to 60%.  He follows with cardiology at [facility] who notes systolic congestive heart failure.  He is on Coreg 3.125 mg twice daily, losartan 25 mg, spironolactone 25 mg per chart review but is not taking these; no SGLT2 inhibitor but this could be a good addition given his poorly controlled diabetes. His GDMT medications have been stopped by cardiology due to low blood pressures.  Evidence of mild fluid overload on exam with 1+ pitting edema to the mid shins.  Also has pleural effusions which could be due to heart failure versus pulmonary infection versus flash pulmonary edema after choking event.    Monitor for need for lasix   Continue to hold coreg 3.125 mg, losartan 25 mg and spironolactone 25 mg per notes with cardiology, he has follow-up on 1/23/25 and can discuss then whether or not to restart and consider SGLT2 inhibitor   Strict I/O's   2L fluid restriction     Dyslipidemia- (present on admission)  Assessment & Plan  Chronic.  On atorvastatin 40 mg daily which will be continued while in the hospital    Primary hypertension- (present on admission)  Assessment & Plan  Chronic.  Stable.  Blood pressures on admission ranging 126-140/64-87.  He is receiving Lasix for his pleural effusion and pulmonary edema.  Also has co-occurring heart failure for which he receives carvedilol and spironolactone but has not been taking this since having hypotensive episodes.  Continue to hold his  losartan 25 mg daily and continue to monitor his blood pressure while patient      Monitor blood pressure     Paroxysmal atrial fibrillation (HCC)- (present on admission)  Assessment & Plan  Chronic.  Was not in A-fib on admission.  Is on apixaban 5 mg twice daily which will be continued while in the hospital.      Continue apixaban 5mg BID         Core Measures:  Fluids: None   Lines: PIV   Abx: C3 and doxycycline   Diet: Puréed and thin liquids  PPX: Apixaban   DISPO: Inpatient management for IV abx and oxygen supplementation. Patient is not cleared for discharge       CODE STATUS: FULL     [place] Griffin M.D.   PGY1  [facility]

## 2025-01-21 NOTE — PROGRESS NOTES
Assumed care of patient at 1900 from day RN. Patient is A&O x 4. Bed locked in the lowest position, 2 side rails up, call light is within reach, belongings at bedside. Pt reports pain level of 0 /10. Mobility up self with FWW Oxygen 2.5 L NC. Explained plan of care and safety precautions to pt, pt has no questions at this time. Hourly rounding is in place.

## 2025-01-21 NOTE — PROGRESS NOTES
Assumed care of pt at 0700. Report received and bedside rounding completed with NOC RN. Pt is calm no SOB, no acute distress noted.    POC discussed with pt, questions answered. White board updated. Call light and pt belongings within reach - safety precautions and hourly rounding in place. See flowsheets for assessment.

## 2025-01-22 ENCOUNTER — PHARMACY VISIT (OUTPATIENT)
Dept: PHARMACY | Facility: MEDICAL CENTER | Age: 75
End: 2025-01-22
Payer: COMMERCIAL

## 2025-01-22 VITALS
HEART RATE: 63 BPM | OXYGEN SATURATION: 94 % | TEMPERATURE: 97.5 F | DIASTOLIC BLOOD PRESSURE: 70 MMHG | SYSTOLIC BLOOD PRESSURE: 139 MMHG | BODY MASS INDEX: 20.33 KG/M2 | HEIGHT: 70 IN | WEIGHT: 142 LBS | RESPIRATION RATE: 18 BRPM

## 2025-01-22 LAB
ALBUMIN SERPL BCP-MCNC: 3.2 G/DL (ref 3.2–4.9)
ALBUMIN/GLOB SERPL: 1.1 G/DL
ALP SERPL-CCNC: 113 U/L (ref 30–99)
ALT SERPL-CCNC: 57 U/L (ref 2–50)
ANION GAP SERPL CALC-SCNC: 7 MMOL/L (ref 7–16)
AST SERPL-CCNC: 35 U/L (ref 12–45)
BILIRUB SERPL-MCNC: 0.2 MG/DL (ref 0.1–1.5)
BUN SERPL-MCNC: 20 MG/DL (ref 8–22)
CALCIUM ALBUM COR SERPL-MCNC: 10 MG/DL (ref 8.5–10.5)
CALCIUM SERPL-MCNC: 9.4 MG/DL (ref 8.5–10.5)
CHLORIDE SERPL-SCNC: 101 MMOL/L (ref 96–112)
CO2 SERPL-SCNC: 30 MMOL/L (ref 20–33)
CREAT SERPL-MCNC: 0.6 MG/DL (ref 0.5–1.4)
ERYTHROCYTE [DISTWIDTH] IN BLOOD BY AUTOMATED COUNT: 53.8 FL (ref 35.9–50)
GFR SERPLBLD CREATININE-BSD FMLA CKD-EPI: 102 ML/MIN/1.73 M 2
GLOBULIN SER CALC-MCNC: 2.9 G/DL (ref 1.9–3.5)
GLUCOSE BLD STRIP.AUTO-MCNC: 192 MG/DL (ref 65–99)
GLUCOSE BLD STRIP.AUTO-MCNC: 71 MG/DL (ref 65–99)
GLUCOSE SERPL-MCNC: 90 MG/DL (ref 65–99)
HCT VFR BLD AUTO: 31.3 % (ref 42–52)
HGB BLD-MCNC: 10 G/DL (ref 14–18)
MCH RBC QN AUTO: 28.2 PG (ref 27–33)
MCHC RBC AUTO-ENTMCNC: 31.9 G/DL (ref 32.3–36.5)
MCV RBC AUTO: 88.4 FL (ref 81.4–97.8)
PLATELET # BLD AUTO: 253 K/UL (ref 164–446)
PMV BLD AUTO: 10.6 FL (ref 9–12.9)
POTASSIUM SERPL-SCNC: 4.1 MMOL/L (ref 3.6–5.5)
PROCALCITONIN SERPL-MCNC: 0.2 NG/ML
PROT SERPL-MCNC: 6.1 G/DL (ref 6–8.2)
RBC # BLD AUTO: 3.54 M/UL (ref 4.7–6.1)
SODIUM SERPL-SCNC: 138 MMOL/L (ref 135–145)
WBC # BLD AUTO: 5.4 K/UL (ref 4.8–10.8)

## 2025-01-22 PROCEDURE — 84145 PROCALCITONIN (PCT): CPT

## 2025-01-22 PROCEDURE — A9270 NON-COVERED ITEM OR SERVICE: HCPCS

## 2025-01-22 PROCEDURE — 82962 GLUCOSE BLOOD TEST: CPT

## 2025-01-22 PROCEDURE — 700102 HCHG RX REV CODE 250 W/ 637 OVERRIDE(OP)

## 2025-01-22 PROCEDURE — RXMED WILLOW AMBULATORY MEDICATION CHARGE

## 2025-01-22 PROCEDURE — 80053 COMPREHEN METABOLIC PANEL: CPT

## 2025-01-22 PROCEDURE — 85027 COMPLETE CBC AUTOMATED: CPT

## 2025-01-22 PROCEDURE — 99238 HOSP IP/OBS DSCHRG MGMT 30/<: CPT | Mod: GC | Performed by: STUDENT IN AN ORGANIZED HEALTH CARE EDUCATION/TRAINING PROGRAM

## 2025-01-22 PROCEDURE — 97116 GAIT TRAINING THERAPY: CPT

## 2025-01-22 PROCEDURE — 97535 SELF CARE MNGMENT TRAINING: CPT | Mod: CO

## 2025-01-22 PROCEDURE — 97530 THERAPEUTIC ACTIVITIES: CPT | Mod: CO

## 2025-01-22 RX ORDER — DOXYCYCLINE 100 MG/1
100 TABLET ORAL EVERY 12 HOURS
Qty: 7 TABLET | Refills: 0 | Status: ACTIVE | OUTPATIENT
Start: 2025-01-22 | End: 2025-01-26

## 2025-01-22 RX ORDER — INSULIN GLARGINE 100 [IU]/ML
10 INJECTION, SOLUTION SUBCUTANEOUS EVERY EVENING
Qty: 15 ML | Refills: 1 | Status: ACTIVE | OUTPATIENT
Start: 2025-01-22 | End: 2025-01-27 | Stop reason: SDUPTHER

## 2025-01-22 RX ORDER — AMOXICILLIN AND CLAVULANATE POTASSIUM 400; 57 MG/5ML; MG/5ML
880 POWDER, FOR SUSPENSION ORAL EVERY 12 HOURS
Qty: 100 ML | Refills: 0 | Status: ACTIVE | OUTPATIENT
Start: 2025-01-22 | End: 2025-01-27

## 2025-01-22 RX ADMIN — APIXABAN 5 MG: 5 TABLET, FILM COATED ORAL at 04:46

## 2025-01-22 RX ADMIN — DOXYCYCLINE 100 MG: 100 TABLET, FILM COATED ORAL at 04:46

## 2025-01-22 RX ADMIN — INSULIN LISPRO 2 UNITS: 100 INJECTION, SOLUTION INTRAVENOUS; SUBCUTANEOUS at 12:30

## 2025-01-22 RX ADMIN — SERTRALINE HYDROCHLORIDE 25 MG: 50 TABLET ORAL at 04:46

## 2025-01-22 RX ADMIN — AMOXICILLIN AND CLAVULANATE POTASSIUM 880 MG OF AMOXICILLIN: 400; 57 POWDER, FOR SUSPENSION ORAL at 04:46

## 2025-01-22 RX ADMIN — ATORVASTATIN CALCIUM 40 MG: 40 TABLET, FILM COATED ORAL at 04:46

## 2025-01-22 ASSESSMENT — COGNITIVE AND FUNCTIONAL STATUS - GENERAL
TOILETING: A LITTLE
HELP NEEDED FOR BATHING: A LITTLE
MOBILITY SCORE: 24
SUGGESTED CMS G CODE MODIFIER DAILY ACTIVITY: CK
SUGGESTED CMS G CODE MODIFIER MOBILITY: CH
PERSONAL GROOMING: A LITTLE
EATING MEALS: A LITTLE
DRESSING REGULAR LOWER BODY CLOTHING: A LITTLE
DAILY ACTIVITIY SCORE: 18
DRESSING REGULAR UPPER BODY CLOTHING: A LITTLE

## 2025-01-22 ASSESSMENT — GAIT ASSESSMENTS
GAIT LEVEL OF ASSIST: SUPERVISED
DISTANCE (FEET): 200
ASSISTIVE DEVICE: SINGLE POINT CANE
DISTANCE (FEET): 23

## 2025-01-22 ASSESSMENT — PAIN DESCRIPTION - PAIN TYPE
TYPE: ACUTE PAIN
TYPE: ACUTE PAIN

## 2025-01-22 NOTE — THERAPY
"Occupational Therapy  Daily Treatment     Patient Name: Rogelio Padilla  Age:  74 y.o., Sex:  male  Medical Record #: [id number]  Today's Date: 1/22/2025     Precautions  Precautions: Fall Risk, Swallow Precautions  Comments: desats with mobility    Assessment    Pt was seen for OT treatment. Pt gave good effort. Pt is a fall risk due to only fair safety awareness. Pt was told to stay sitting on EOB and OT would get a face cloth. OT observed pt get up and OOB attempting to walk to sink . Pt told to stop.Pt did follow direction. Pt demos only fair STM issues. Pt stated he wanted to brush his teeth and CNA informed pt he did early today and pt stated \"No, I did not do my teeth today.\" Pt required CGA for STS and to ambulate to sink without AD  requiring SBA for oral hygiene and grooming. Pt back to EOB with CGA and demo supervision only for UB and LB dressing. Pt donned pants ,socks and shoes then sat up in chair with CGA to eat breakfast. Pt is making progress towards OT STG's. RN in with pt. RN informed OT that pt may be d/c'd today. Will continue to follow if pt is not d/c'd today as planned.      Plan    Treatment Plan Status: (P) Continue Current Treatment Plan  Type of Treatment: Self Care / Activities of Daily Living, Adaptive Equipment, Community Re-Integration, Manual Therapy Techniques, Neuro Re-Education / Balance, Therapeutic Exercises, Therapeutic Activity  Treatment Frequency: 3 Times per Week  Treatment Duration: Until Therapy Goals Met    DC Equipment Recommendations: (P) None  Discharge Recommendations: (P) Recommend home health for continued occupational therapy services    Subjective     \"I think I need a FWW in my room but I'll try without\".      Objective       01/22/25 0853   Cognition    Cognition / Consciousness X   Speech/ Communication Hard of Hearing   Level of Consciousness Alert   Safety Awareness Impulsive;Impaired   Comments Pleasant and cooperative   Passive ROM Upper Body   Passive " ROM Upper Body WDL   Active ROM Upper Body   Dominant Hand Right   Strength Upper Body   Comments WFL for simple ADL's as demo   Other Treatments   Other Treatments Provided Education on safety awareness. Can be impulsive. Fall risk   Balance   Sitting Balance (Static) Good   Sitting Balance (Dynamic) Fair   Standing Balance (Static) Fair   Standing Balance (Dynamic) Fair -   Weight Shift Sitting Fair   Weight Shift Standing Fair   Comments HHA without AD to /from bed to sink   Bed Mobility    Supine to Sit Supervised   Sit to Supine   (Pt left up in chair at the bedside eating breakfast.)   Scooting Supervised   Rolling Supervised   Comments Supervised for all bed mobility.   Activities of Daily Living   Eating Modified Independent   Grooming Standby Assist;Standing   Bathing   (declined)   Upper Body Dressing Supervision   Lower Body Dressing Supervision   Toileting   (declined the need)   Skilled Intervention Verbal Cuing;Tactile Cuing;Sequencing;Compensatory Strategies   Comments SBA/Supervision for simple self care tasks .Pt will need assist for I ADL's.   Functional Mobility   Sit to Stand Contact Guard Assist   Bed, Chair, Wheelchair Transfer Contact Guard Assist   Toilet Transfers Contact Guard Assist   Mobility No AD in room as demo   Comments No AD in room . HHA/CGA for safety at times.   Activity Tolerance   Comments slight increase in activity tolerance.   Short Term Goals   Short Term Goal # 1 Pt will complete ADL transfers with supervision   Goal Outcome # 1 Progressing as expected   Short Term Goal # 2 Pt will complete LB dressing with supervision   Goal Outcome # 2 Goal met   Short Term Goal # 3 Pt will complete toileting with supervision   Goal Outcome # 3 Progressing as expected   Occupational Therapy Treatment Plan    O.T. Treatment Plan Continue Current Treatment Plan   Anticipated Discharge Equipment and Recommendations   DC Equipment Recommendations None   Discharge Recommendations Recommend  home health for continued occupational therapy services   Interdisciplinary Plan of Care Collaboration   IDT Collaboration with  Nursing;Certified Nursing Assistant   Collaboration Comments RN updated

## 2025-01-22 NOTE — DISCHARGE PLANNING
Care Transition Team Assessment    NOK is pt's daughter, Swapna, [phone]    RN CM met pt at bedside for assessment. He lives with his daughter in a 2 story home but he stays on one floor. He uses a walker and his daughter drives pt to appointments as he stopped driving 6 months ago. He sees Lucas Rose for PCP. He has Medicare and [place] [employer] [place]. He will be discharging home with new oxygen from [facility] and [facility] services, which he has had home health before. Daughter will be ride home.                                                                    Information Source  Information Given By: Patient    Readmission Evaluation  Is this a readmission?: No    Elopement Risk  Legal Hold: No  Ambulatory or Self Mobile in Wheelchair: Yes  Disoriented: No  Psychiatric Symptoms: None  History of Wandering: No  Elopement this Admit: No  Vocalizing Wanting to Leave: No  Displays Behaviors, Body Language Wanting to Leave: No-Not at Risk for Elopement  Elopement Risk: Not at Risk for Elopement    Interdisciplinary Discharge Planning  Primary Care Physician: Narciso Rose  Lives with - Patient's Self Care Capacity: Adult Children  Patient or legal guardian wants to designate a caregiver: No  Support Systems: Family Member(s)  Housing / Facility: 2 Story House  Name of Care Facility: NA  Able to Return to Previous ADL's: Yes  Mobility Issues: No  Prior Services: None  Patient Prefers to be Discharged to:: home    Discharge Preparedness  What is your plan after discharge?: Home with help, Home health care  What are your discharge supports?: Child  Prior Functional Level: Ambulatory    Functional Assesment  Prior Functional Level: Ambulatory    Finances  Financial Barriers to Discharge: No  Prescription Coverage: Yes    Vision / Hearing Impairment  Vision Impairment : No  Right Eye Vision: Impaired, Wears Glasses  Left Eye Vision: Impaired, Wears Glasses  Hearing Impairment : Yes  Hearing Impairment: Both  LVM for patient to call us back to make an appt w/ Dr. Coffey.   Ears    Domestic Abuse  Have you ever been the victim of abuse or violence?: No  Possible Abuse/Neglect Reported to:: Not Applicable    Anticipated Discharge Information  Discharge Disposition: D/T to home under HHA care in anticipation of covered skilled care (06)

## 2025-01-22 NOTE — PROGRESS NOTES
Assumed care of patient at 1900 from day RN. Patient is A&O x 4. Bed locked in the lowest position, 2 side rails up, call light is within reach, belongings at bedside. Pt reports pain level of 0 /10. Mobility standby assist. Oxygen 4 L NC, oxy mask 5L at night. No signs of distress, pt resting in bed. Explained plan of care and safety precautions to pt, pt has no questions at this time. Hourly rounding is in place.

## 2025-01-22 NOTE — THERAPY
"Speech Language Pathology   Daily Treatment     Patient Name: Rogelio Padilla  AGE:  74 y.o., SEX:  male  Medical Record #: [id number]  Date of Service: 1/21/2025      Precautions:  Precautions: Fall Risk, Swallow Precautions       Subjective  Pt agreeable and cooperative with SLP tx tasks. Reported recall of MBSS but not of detailed findings. RN reports that patient reports some dysphagia but has not had overt difficulty this shift. Pt agrees - he reports dysphagia symptoms over the past few months but has not had difficulty with puree diet since MBSS.       Assessment  Pt seen for dysphagia management. Seated upright in bed. On 2L NC, satting in the 90s.     Discussed MBSS findings at length, including conversation concerning pharyngeal vs. esophageal deficits. Trials of Ice chips, Puree solids (PU4), and Minced and moist solids (MM5) provided during session. Pt w/ adequate self-feeding. Appropriate oral bolus stripping and containment. Presumed complete AP transit with effective bolus formation evidenced by complete clearance of bolus from oral cavity. No overt s/sx of aspiration noted w/ single and sequential sips of TN0. Denied globus. One to two swallows appreciated per bolus. Discussed IDDSI levels and patient verbalized preference for puree diet.       Clinical Impressions  Pt presents with tolerance of puree / thin diet and overall presentation consistent with findings from MBSS completed on 1/16. Further acute SLP services are no longer indicated. Consider SLP / GI in the NLOC due to MBSS finding including the following: \"Esophageal clearance was characterized by esophageal retention with retrograde flow below the level of the uES; most notable in distal esophagus. However, mild retention of LQ3 observed in mid esophagus.\"     Please re-consult SLP with s/sx concerning for aspiration or change in pt status.       Recommendations  Puree solids / thin liquids  Instrumentation: None indicated at this " "time  Medication: Whole with puree, Crush with pudding/puree, as appropriate  Supervision: Close supervision - patient may be left alone for less than 5 minutes at a time  Positioning: Fully upright and midline during oral intake, Remain upright for 30-45 minutes after oral intake, Meals sitting upright in a chair, as tolerated  Oral Care: Q4h    Consult Referral(s): Gastroenterologist (can be in NLOC)      SLP Treatment Plan  Treatment Plan: None Indicated  SLP Frequency: D/c - goals met  Estimated Duration: D/c - goals met      Anticipated Discharge Needs  Discharge Recommendations: Recommend outpatient GI and SLP  Therapy Recommendations Upon DC: Dysphagia Training, Patient / Family / Caregiver Education, Community Re-Integration, Motor speech      Patient / Family Goals  Patient / Family Goal #1: \"Can I have some breakfast?\"  Goal #1 Outcome: Goal met  Short Term Goals  Short Term Goal # 1: Pt will complete a diagnostic swallow study to further assess swallow function and inform POC  Goal Outcome # 1: Goal met, new goal added  Short Term Goal # 1 B : Pt will consume an oral diet of  minced&moist solids/thin liquids without overt s/sx of aspiration or decline in respiratory status  Goal Outcome  # 1 B: Goal not met  Short Term Goal # 2: Pt will completed exercises targeting BOT retraction, epiglottic inversion, LVC, and pharyngeal constriction/shortening x50 in a session given modA  Goal Outcome # 2 : Goal met  Short Term Goal # 3: Pt will consume an oral diet of puree solids/thin liquids without overt s/sx of aspiration or decline in respiratory status  Goal Outcome  # 3: Goal met      ALEJANDRA Du  "

## 2025-01-22 NOTE — DISCHARGE SUMMARY
[facility] Discharge Summary    Attending: Biju Bernard M.d.  Senior Resident: Daphney Ramos M.D.       CHIEF COMPLAINT ON ADMISSION  Chief Complaint   Patient presents with    Choking     Pt was found down unresponsive by daughter. On EMS arrival pt was unresponsive and apneic when they rolled pt over a large piece of food fell out of pt's mouth. Initial RA sat 45%, pt was bagged en route and is now awake and talking. Pt reports the last thing he remembers was eating dinner. Pt reports hx of choking on food. Pt endorsing mild SOB.        Reason for Admission  EMS     Admission Date  1/15/2025    CODE STATUS  Full Code    HPI & HOSPITAL COURSE  This is a 74 y.o. male here with hypoxia after a choking incident witnessed at home. Patient on arrival was found to have acute respiratory failure.     Acute respiratory failure (HCC)  #pleural effusion  #Pulmonary infiltrates   Choking incident while eating dinner 1/15/2025.  Patient on arrival desatted into the 40% requiring supplemental oxygen.  Patient has no O2 demand at baseline however per nursing daughter reports that patient would routinely desat to the 80s and 70s while at PCP visits. During hospitalization patient required 3 to 5 L of oxygen, which he was discharged with home O2.    Patient reported recent viral illness  and given his leukocytosis and CT chest demonstrating pulmonary infiltrates and pleural effusion there is concern for post viral pneumonia process.  Therefore patient was started on IV ceftriaxone and doxycycline.  Patient did have increased right moderate base atelectasis and small right pleural effusion on chest x-ray 1/19.  Dr. Amaro pulmonology was consulted he recommended GI for possible esophageal dysmotility and IR for thoracentesis.  He suggested a total 10-day course of antibiotics.  Respiratory panel was negative.  Pulmonology also had concern about possible PEG tube placement.  GI recommended outpatient follow-up for  manometry as patient did declined PEG tube placement while inpatient.  IR saw patient at bedside for ultrasound-guided thoracentesis and stated that the small right pleural effusion was too small for thoracentesis.      Oropharyngeal dysphagia  Patient presenting following choking on food leading to syncopal episode and hypoxia.  Speech eval and swallow study completed 1/16/25 with recommendations of minced soft foods and therapies.  Patient continued to desat while eating.  SLP reevaluated and has downgraded patient to puréed diet and thin liquids.  Pulmonology is concern for esophageal dysmotility causing aspiration events.  Patient declined PEG tube at this time and was able to verbalize understanding of risks of aspiration if he did not follow SLP recommendations. Patient will follow speech with home health and outpatient GI for manometry.     Thickening of wall of gallbladder with pericholecystic fluid  Incidental finding on CT chest, abdomen, pelvis.  LFTs mildly elevated as is alkaline phosphatase as AST/ALT 69/116.  Alkaline phosphatase 147.  Bilirubin within normal limits.  Exam without tenderness in the right upper quadrant.  Patient is currently on ceftriaxone which can impact and contribute to the formation of biliary sludge. RUQ ultrasound completed with no gallstones noted but wall thickening and possible hemangioma. Patient remains asymptomatic. Referral to GI outpatient GI placed at discharge.    Type 2 diabetes mellitus, with long-term current use of insulin ([facility])  Patient with poorly controlled, A1c in September 2024 was 9.3.  Patient reported taking Lantus 13 units nightly, no other antiglycemic medications as they were discontinued when he was having hypotensive episodes.  Blood sugar markedly elevated on presentation at 438.  Updated A1c of 11.1%. Patient was discharged on 10U glargine as he had low fasting blood sugars to the 70s and was instructed to follow-up with PCP.     Rogelio Rao  Valerie was continued on his home medications for his hyperlipidemia and depression. His medications for congestive heart failure were held due to patient not taking per Cardiology for hypotension. He was instructed to follow-up with Cardiology at his appointment on 1/23/25 to discuss if and when these medication should be restarted.       Therefore, he is discharged in good and stable condition to home with organized home healthcare and close outpatient follow-up.    The patient met 2-midnight criteria for an inpatient stay at the time of discharge.    Discharge Date  01/22/25     Physical Exam on Day of Discharge  Physical Exam  Constitutional:       General: He is not in acute distress.     Appearance: He is not ill-appearing or toxic-appearing.   HENT:      Head: Normocephalic.      Nose: Nose normal.      Mouth/Throat:      Mouth: Mucous membranes are moist.      Pharynx: Oropharynx is clear.   Eyes:      Extraocular Movements: Extraocular movements intact.      Conjunctiva/sclera: Conjunctivae normal.   Cardiovascular:      Rate and Rhythm: Normal rate and regular rhythm.   Pulmonary:      Effort: No tachypnea or respiratory distress.      Comments: Mild decreased breath sounds at the bases otherwise patient had good air movement  Abdominal:      General: Abdomen is flat.      Palpations: Abdomen is soft.      Tenderness: There is no abdominal tenderness.   Musculoskeletal:         General: Normal range of motion.      Cervical back: Normal range of motion.      Right lower leg: No edema.      Left lower leg: No edema.   Skin:     General: Skin is warm.      Capillary Refill: Capillary refill takes less than 2 seconds.   Neurological:      General: No focal deficit present.      Mental Status: He is alert and oriented to person, place, and time.   Psychiatric:         Mood and Affect: Mood normal.         FOLLOW UP ITEMS POST DISCHARGE  PCP follow-up   Diabetes management   GI Follow-up   Manometry     DISCHARGE  DIAGNOSES  Principal Problem:    Acute respiratory failure (HCC) (POA: Yes)  Active Problems:    Paroxysmal atrial fibrillation (HCC) (POA: Yes)    Primary hypertension (POA: Yes)    Dyslipidemia (POA: Yes)    Chronic systolic congestive heart failure (HCC) (POA: Yes)    Pleural effusion (POA: Yes)    Type 2 diabetes mellitus, with long-term current use of insulin (HCC) (POA: Yes)    Oropharyngeal dysphagia (POA: Yes)    Anemia (POA: Yes)    Thickening of wall of gallbladder with pericholecystic fluid (POA: Yes)    Depression (POA: Unknown)  Resolved Problems:    * No resolved hospital problems. *      FOLLOW UP  Future Appointments   Date Time Provider Department Center   1/23/2025  9:00 AM Rupesh Bermeo M.D. [facility] None   1/27/2025  9:30 AM Narciso Rose M.D. [facility] Rose M.D.  [address]  [facility] [address]  [phone]          [address] [address]  [phone]          MEDICATIONS ON DISCHARGE     Medication List        CONTINUE taking these medications        Instructions   Alcohol Swabs   Doctor's comments: Per formulary preference. ICD-10 code: E11.9 Controlled type 2 Diabetes Mellitus  Wipe site with prep pad prior to injection.     * Blood Glucose Meter Kit   Doctor's comments: Or per formulary preference. ICD-10 code: E11.65 Uncontrolled type 2 Diabetes Mellitus  Test blood sugar as recommended by provider. Embrace pro blood glucose monitoring kit.     * Blood Glucose Test Strips   Doctor's comments: Or per formulary preference. ICD-10 code: E11.9 Controlled type 2 Diabetes Mellitus  Use one Embrace pro strip to test blood sugar three times daily.     FreeStyle Yonas 3 [place] Zuri   1 Units every day. Use daily for continuous blood glucose monitoring  Dose: 1 Units     Insulin Pen Needle 32 G x 4 mm   Doctor's comments: Per patient/formulary preference. ICD-10 code: E11.9 Controlled type 2 Diabetes  Mellitus  Use one pen needle in pen device to inject insulin once daily.     Lancets   Doctor's comments: Or per formulary preference. ICD-10 code: E11.9 Controlled type 2 Diabetes Mellitus  Use one Embrace pro lancet to test blood sugar three times daily.           * This list has 2 medication(s) that are the same as other medications prescribed for you. Read the directions carefully, and ask your doctor or other care provider to review them with you.                ASK your doctor about these medications        Instructions   apixaban 5mg Tabs  Commonly known as: Eliquis   Take 1 Tablet by mouth 2 times a day.  Dose: 5 mg     atorvastatin 40 MG Tabs  Commonly known as: Lipitor   Take 1 Tablet by mouth every day.  Dose: 40 mg     carvedilol 3.125 MG Tabs  Commonly known as: Coreg   Take 3.125 mg by mouth 2 times a day.  Dose: 3.125 mg     losartan 25 MG Tabs  Commonly known as: Cozaar   Take 1 Tablet by mouth every day.  Dose: 25 mg     NON SPECIFIED   Administer 1 Dose into affected eye(s). Injections into both eyes every 6 weeks  Dose: 1 Dose     OneTouch Ultra strip  Generic drug: glucose blood   1 Strip by Other route every morning. Or as needed for any symptoms of hypoglygemia or hyperglycemia.  Dose: 1 Each     sertraline 25 MG tablet  Commonly known as: Zoloft   Take 1 Tablet by mouth every day.  Dose: 25 mg     spironolactone 25 MG Tabs  Commonly known as: Aldactone   Take 1 Tablet by mouth every day.  Dose: 25 mg              Allergies  Allergies   Allergen Reactions    Metformin Diarrhea          Neosporin [Bacitracin-Polymyxin B] Rash          Penicillins Rash      Rash as a teenager, reports tolerating amoxicillin in the past        DIET  Orders Placed This Encounter   Procedures    Diet Order Diet: Level 4 - Pureed; Liquid level: Level 0 - Thin; Second Modifier: (optional): Consistent CHO (Diabetic)     Standing Status:   Standing     Number of Occurrences:   1     Order Specific Question:   Diet:      Answer:   Level 4 - Pureed [25]     Order Specific Question:   Liquid level     Answer:   Level 0 - Thin     Order Specific Question:   Second Modifier: (optional)     Answer:   Consistent CHO (Diabetic) [4]       ACTIVITY  As tolerated.  Weight bearing as tolerated    CONSULTATIONS  Pulmonology     PROCEDURES  None     LABORATORY  Lab Results   Component Value Date    SODIUM 138 01/22/2025    POTASSIUM 4.1 01/22/2025    CHLORIDE 101 01/22/2025    CO2 30 01/22/2025    GLUCOSE 90 01/22/2025    BUN 20 01/22/2025    CREATININE 0.60 01/22/2025        Lab Results   Component Value Date    WBC 5.4 01/22/2025    HEMOGLOBIN 10.0 (L) 01/22/2025    HEMATOCRIT 31.3 (L) 01/22/2025    PLATELETCT 253 01/22/2025

## 2025-01-22 NOTE — DISCHARGE PLANNING
Case Management Discharge Planning    Admission Date: 1/15/2025  GMLOS: 4.6  ALOS: 7    6-Clicks ADL Score: 18  6-Clicks Mobility Score: 19      Anticipated Discharge Dispo: Discharge Disposition: D/T to home under [facility] care in anticipation of covered skilled care (06)    DME Needed: Yes    DME Ordered: Yes    Action(s) Taken: RN NAGI spoke with pt's daughter via phone and she reports that pt's oxygen has been delivered to their home. She is bringing portable oxygen concentrator to hospital when she picks pt up for discharge. MD updated.

## 2025-01-22 NOTE — THERAPY
Physical Therapy   Discharge     Patient Name: Rogelio Padilla  Age:  74 y.o., Sex:  male  Medical Record #: [id number]  Today's Date: 1/22/2025     Precautions  Precautions: Fall Risk;Swallow Precautions  Comments: desats with mobility    Assessment    Rec'd pt alert, pleasant and eager to work w/ PT.  Today, PT goals have been met.  He is able to mobilize as noted below, including ambulating 200 ft and moving up/down one step, w/o loss of balance or need of assist.  PT for d/c needs    Plan    Reason for Discharge From Therapy: Discharge Secondary to Goals Met    DC Equipment Recommendations: None  Discharge Recommendations: Recommend home health for continued physical therapy services         Objective       01/22/25 0935   Balance   Sitting Balance (Static) Fair +   Sitting Balance (Dynamic) Fair +   Standing Balance (Static) Fair   Standing Balance (Dynamic) Fair   Weight Shift Sitting Good   Weight Shift Standing Good   Comments w/ cane   Bed Mobility    Supine to Sit Supervised   Sit to Supine Standby Assist   Gait Analysis   Gait Level Of Assist Supervised   Assistive Device Single Point Cane   Distance (Feet) 200   # of Stairs Climbed 1   Level of Assist with Stairs Supervised   Skilled Intervention Verbal Cuing   Functional Mobility   Sit to Stand Supervised   Bed, Chair, Wheelchair Transfer Supervised   Short Term Goals    Short Term Goal # 1 Pt will perform supine <> sit with SPV in 6 visits to get in/out of bed   Goal Outcome # 1 Goal met   Short Term Goal # 2 Pt will perform stand step transfers with LRAD and SPV in 6 visits to get in/out of chair   Goal Outcome # 2 Goal met   Short Term Goal # 3 Pt will ambulate 150ft with LRAD and SPV in 6 visits to access home environment   Goal Outcome # 3 Goal met   Short Term Goal # 4 Pt will ascend/descend 1 steps with unilateral UE support and SBA in 6 visits to safely enter/exit home   Goal Outcome # 4 Goal met   Physical Therapy Treatment Plan   Physical  Therapy Treatment Plan Modify Current Treatment Plan   Duration Discharge Needs Only   Reason For Discharge Discharge Secondary to Goals Met   Anticipated Discharge Equipment and Recommendations   DC Equipment Recommendations None   Discharge Recommendations Recommend home health for continued physical therapy services

## 2025-01-22 NOTE — PROGRESS NOTES
Patient arrived to discharge lounge. PIV removed, tip intact. Discussed discharge paperwork and medications with patient and family. Answered all questions. No home meds to return, M2B given to patient. Patient escorted out with family via wheelchair, personal belongings in hand.

## 2025-01-22 NOTE — HOSPITAL COURSE
Rogelio Padilla is a 74 y.o. male who presented following choking on his dinner and was admitted for acute respiratory

## 2025-01-22 NOTE — CARE PLAN
The patient is Stable - Low risk of patient condition declining or worsening    Shift Goals  Clinical Goals: Pt's SPO2 will remain above 90 this shift,  Patient Goals: sleep  Family Goals: updates on POC    Progress made toward(s) clinical / shift goals: Pt's breathing remained stable with RRs between 16-20, SPO2 levels were monitored this shift and maintained above 90% with the use of O2 at 5 L oxy mask at night.     Problem: Respiratory  Goal: Patient will achieve/maintain optimum respiratory ventilation and gas exchange  Outcome: Progressing     Patient is not progressing towards the following goals:       Metronidazole Counseling:  I discussed with the patient the risks of metronidazole including but not limited to seizures, nausea/vomiting, a metallic taste in the mouth, nausea/vomiting and severe allergy.

## 2025-01-23 ENCOUNTER — TELEPHONE (OUTPATIENT)
Dept: MEDICAL GROUP | Facility: CLINIC | Age: 75
End: 2025-01-23

## 2025-01-23 ENCOUNTER — OFFICE VISIT (OUTPATIENT)
Dept: CARDIOLOGY | Facility: MEDICAL CENTER | Age: 75
End: 2025-01-23
Attending: INTERNAL MEDICINE
Payer: MEDICARE

## 2025-01-23 VITALS
BODY MASS INDEX: 23.34 KG/M2 | SYSTOLIC BLOOD PRESSURE: 118 MMHG | RESPIRATION RATE: 16 BRPM | HEART RATE: 67 BPM | WEIGHT: 163 LBS | HEIGHT: 70 IN | DIASTOLIC BLOOD PRESSURE: 64 MMHG | OXYGEN SATURATION: 94 %

## 2025-01-23 DIAGNOSIS — Z79.01 CHRONIC ANTICOAGULATION: ICD-10-CM

## 2025-01-23 DIAGNOSIS — I48.0 PAROXYSMAL ATRIAL FIBRILLATION (HCC): ICD-10-CM

## 2025-01-23 DIAGNOSIS — E78.5 DYSLIPIDEMIA: ICD-10-CM

## 2025-01-23 PROBLEM — I10 PRIMARY HYPERTENSION: Status: RESOLVED | Noted: 2024-10-18 | Resolved: 2025-01-23

## 2025-01-23 PROCEDURE — 3074F SYST BP LT 130 MM HG: CPT | Performed by: INTERNAL MEDICINE

## 2025-01-23 PROCEDURE — 3078F DIAST BP <80 MM HG: CPT | Performed by: INTERNAL MEDICINE

## 2025-01-23 PROCEDURE — 99214 OFFICE O/P EST MOD 30 MIN: CPT | Performed by: INTERNAL MEDICINE

## 2025-01-23 PROCEDURE — 99213 OFFICE O/P EST LOW 20 MIN: CPT | Performed by: INTERNAL MEDICINE

## 2025-01-23 ASSESSMENT — ENCOUNTER SYMPTOMS
FOCAL WEAKNESS: 0
VOMITING: 0
RESPIRATORY NEGATIVE: 1
MUSCULOSKELETAL NEGATIVE: 1
GASTROINTESTINAL NEGATIVE: 1
EYES NEGATIVE: 1
HEADACHES: 0
CARDIOVASCULAR NEGATIVE: 1
CLAUDICATION: 0
DIZZINESS: 0
COUGH: 0
CONSTITUTIONAL NEGATIVE: 1
FEVER: 0
SHORTNESS OF BREATH: 0
PSYCHIATRIC NEGATIVE: 1
CHILLS: 0
ABDOMINAL PAIN: 0
WEAKNESS: 0
MYALGIAS: 0
PALPITATIONS: 0
BRUISES/BLEEDS EASILY: 0
BLURRED VISION: 0
NEUROLOGICAL NEGATIVE: 1
NAUSEA: 0
DOUBLE VISION: 0
DEPRESSION: 0
WEIGHT LOSS: 0
NERVOUS/ANXIOUS: 0

## 2025-01-23 ASSESSMENT — FIBROSIS 4 INDEX: FIB4 SCORE: 1.32

## 2025-01-23 NOTE — TELEPHONE ENCOUNTER
Catherine Rose,    [facility] would like to know if you would like to know if you are willing to sign this pt's home health admission after his discharge. Please advise. Thank you    Heriberto

## 2025-01-23 NOTE — PROGRESS NOTES
Chief Complaint   Patient presents with    Atrial Fibrillation     F/V Dx: Paroxysmal atrial fibrillation (HCC)    Hypertension    Anticoagulation       Subjective     Rogelio Padilla is a 74 y.o. male who presents today for follow up of atrial fibrillation on chronic anticoagulation therapy, hyperlipidemia, prior chronic systolic congestive heart failure diagnosed in [place].     Since the patient's last visit on 11/12/24, he has been doing well clinically from cardiac standpoint. He admit to shortness of breath, now on increased oxygen requirements. He denies fatigue, chest pain, palpitations, lower extremity edema, dizziness or syncope.  He was recently admitted to [facility] for aspiration pneumonia.     Past Medical History:   Diagnosis Date    Atrial fibrillation (HCC)     CHF (congestive heart failure) (HCC)     Diabetes mellitus type 2, noninsulin dependent (HCC)     Hyperlipidemia     Hypertension      Past Surgical History:   Procedure Laterality Date    HERNIA REPAIR       Family History   Problem Relation Age of Onset    Heart Attack Mother      Social History     Socioeconomic History    Marital status:      Spouse name: Not on file    Number of children: Not on file    Years of education: Not on file    Highest education level: Not on file   Occupational History    Not on file   Tobacco Use    Smoking status: Never     Passive exposure: Current    Smokeless tobacco: Never   Vaping Use    Vaping status: Never Used   Substance and Sexual Activity    Alcohol use: Not Currently     Alcohol/week: 3.6 oz     Types: 6 Cans of beer per week    Drug use: Never    Sexual activity: Not on file   Other Topics Concern    Not on file   Social History Narrative    Not on file     Social Drivers of Health     Financial Resource Strain: Not on file   Food Insecurity: No Food Insecurity (1/21/2025)    Hunger Vital Sign     Worried About Running Out of Food in the Last Year: Never true     Ran Out  of Food in the Last Year: Never true   Transportation Needs: No Transportation Needs (1/21/2025)    PRAPARE - Transportation     Lack of Transportation (Medical): No     Lack of Transportation (Non-Medical): No   Physical Activity: Not on file   Stress: Not on file   Social Connections: Not on file   Intimate Partner Violence: Not At Risk (1/21/2025)    Humiliation, Afraid, Rape, and Kick questionnaire     Fear of Current or Ex-Partner: No     Emotionally Abused: No     Physically Abused: No     Sexually Abused: No   Housing Stability: Low Risk  (1/21/2025)    Housing Stability Vital Sign     Unable to Pay for Housing in the Last Year: No     Number of Times Moved in the Last Year: 1     Homeless in the Last Year: No     Allergies   Allergen Reactions    Metformin Diarrhea          Neosporin [Bacitracin-Polymyxin B] Rash          Penicillins Rash      Rash as a teenager, reports tolerating amoxicillin in the past   Tolerated Augmentin Jan 2025     (Medications reviewed.)  Outpatient Encounter Medications as of 1/23/2025   Medication Sig Dispense Refill    amoxicillin-clavulanate (AUGMENTIN) 400-57 MG/5ML Recon Susp suspension Take 11 mL by mouth every 12 hours for 7 doses. Discard remainder. 100 mL 0    doxycycline monohydrate (ADOXA) 100 MG tablet Take 1 Tablet by mouth every 12 hours for 7 doses. 7 Tablet 0    insulin glargine (LANTUS SOLOSTAR) 100 UNIT/ML Solution Pen-injector injection Inject 10 Units under the skin every evening. 15 mL 1    glucose blood (ONETOUCH ULTRA) strip 1 Strip by Other route every morning. Or as needed for any symptoms of hypoglygemia or hyperglycemia. 100 Strip 5    NON SPECIFIED Administer 1 Dose into affected eye(s). Injections into both eyes every 6 weeks      Blood Glucose Test Strips Use one Embrace pro strip to test blood sugar three times daily. 100 Strip 0    Lancets Use one Embrace pro lancet to test blood sugar three times daily. 100 Each 0    Insulin Pen Needle 32 G x 4 mm  "Use one pen needle in pen device to inject insulin once daily. 100 Each 3    atorvastatin (LIPITOR) 40 MG Tab Take 1 Tablet by mouth every day. 90 Tablet 3    Continuous Glucose  (FREESTYLE HALEY 3 READER) Device 1 Units every day. Use daily for continuous blood glucose monitoring 1 Each 0    apixaban (ELIQUIS) 5mg Tab Take 1 Tablet by mouth 2 times a day. 180 Tablet 3    Blood Glucose Meter Kit Test blood sugar as recommended by provider. Embrace pro blood glucose monitoring kit. 1 Kit 0    Alcohol Swabs Wipe site with prep pad prior to injection. 100 Each 2    sertraline (ZOLOFT) 25 MG tablet Take 1 Tablet by mouth every day. 30 Tablet 11     No facility-administered encounter medications on file as of 1/23/2025.     Review of Systems   Constitutional: Negative.  Negative for chills, fever, malaise/fatigue and weight loss.   HENT: Negative.  Negative for hearing loss.    Eyes: Negative.  Negative for blurred vision and double vision.   Respiratory: Negative.  Negative for cough and shortness of breath.    Cardiovascular: Negative.  Negative for chest pain, palpitations, claudication and leg swelling.   Gastrointestinal: Negative.  Negative for abdominal pain, nausea and vomiting.   Genitourinary: Negative.  Negative for dysuria and urgency.   Musculoskeletal: Negative.  Negative for joint pain and myalgias.   Skin: Negative.  Negative for itching and rash.   Neurological: Negative.  Negative for dizziness, focal weakness, weakness and headaches.   Endo/Heme/Allergies: Negative.  Does not bruise/bleed easily.   Psychiatric/Behavioral: Negative.  Negative for depression. The patient is not nervous/anxious.               Objective     /64 (BP Location: Left arm, Patient Position: Sitting, BP Cuff Size: Adult)   Pulse 67   Resp 16   Ht 1.778 m (5' 10\")   Wt 73.9 kg (163 lb)   SpO2 94%   BMI 23.39 kg/m²     Physical Exam  Constitutional:       Appearance: Normal appearance. He is well-developed and " normal weight.   HENT:      Head: Normocephalic and atraumatic.   Neck:      Vascular: No JVD.   Cardiovascular:      Rate and Rhythm: Normal rate and regular rhythm.      Heart sounds: Normal heart sounds.   Pulmonary:      Effort: Pulmonary effort is normal.      Breath sounds: Normal breath sounds.   Abdominal:      General: Bowel sounds are normal.      Palpations: Abdomen is soft.      Comments: No hepatosplenomegaly.   Musculoskeletal:         General: Normal range of motion.   Lymphadenopathy:      Cervical: No cervical adenopathy.   Skin:     General: Skin is warm and dry.   Neurological:      Mental Status: He is alert and oriented to person, place, and time.            CARDIAC STUDIES/PROCEDURES:     ECHOCARDIOGRAM CONCLUSIONS (10/25/24)  No prior study is available for comparison.   Normal left ventricular systolic function.  The left ventricular ejection fraction is visually estimated to be 55-60%.  Mild mitral regurgitation.  Mild aortic insufficiency.  Mild tricuspid regurgitation.  Estimated right ventricular systolic pressure is 35 mmHg.  The ascending aorta is borderline dilated with a diameter of 4.0 cm.    ECHOCARDIOGRAM CONCLUSIONS in [facility] (09/20/24)  Echocardiogram showing ejection fraction of 40%.     EKG performed on (11/01/24) EKG shows sinus rhythm.   EKG performed on (10/18/24) EKG shows sinus rhythm.   EKG performed on (09/24/24) EKG shows atrial fibrillation.      MYOCARDIAL PERFUSION STUDY CONCLUSIONS (10/24/24)  NUCLEAR IMAGING INTERPRETATION  Normal Lexiscan myocardial perfusion study.  No evidence of ischemia or infarct.  Diaphragmatic artifact.  SDS 2.  LVEF 63%.  No ischemic changes with Regadenoson.  No arrhythmias.  ECG INTERPRETATION  Negative stress ECG for ischemia.    Assessment & Plan     1. Paroxysmal atrial fibrillation (HCC)        2. Chronic anticoagulation        3. Dyslipidemia            Medical Decision Making: Today's  Assessment/Status/Plan:        Atrial fibrillation on anticoagulation therapy (Eliquis): He is a 74 year old male with atrial fibrillation on chronic anticoagulation therapy and hyperlipidemia, prior chronic systolic congestive heart failure diagnosed in [place].  He is doing well on anticoagulation without palpitations.   Hyperlipidemia: He is doing well on statin therapy without myalgia symptoms.  Prior congestive heart failure and cardiomyopathy: His volume status remains normal on no diuretic medications.  Shortness of breath: He is experiencing shortness of breath which is out of proportion to his cardiac issues.     We will follow up the patient in 1 year.    CC Narciso Lopez

## 2025-01-27 ENCOUNTER — OFFICE VISIT (OUTPATIENT)
Dept: MEDICAL GROUP | Facility: CLINIC | Age: 75
End: 2025-01-27
Payer: MEDICARE

## 2025-01-27 VITALS
SYSTOLIC BLOOD PRESSURE: 150 MMHG | WEIGHT: 164 LBS | HEIGHT: 70 IN | TEMPERATURE: 97.1 F | BODY MASS INDEX: 23.48 KG/M2 | RESPIRATION RATE: 16 BRPM | OXYGEN SATURATION: 97 % | DIASTOLIC BLOOD PRESSURE: 75 MMHG | HEART RATE: 65 BPM

## 2025-01-27 DIAGNOSIS — E78.5 DYSLIPIDEMIA: ICD-10-CM

## 2025-01-27 DIAGNOSIS — Z79.4 TYPE 2 DIABETES MELLITUS WITH HYPERGLYCEMIA, WITH LONG-TERM CURRENT USE OF INSULIN (HCC): ICD-10-CM

## 2025-01-27 DIAGNOSIS — I50.22 CHRONIC SYSTOLIC CONGESTIVE HEART FAILURE (HCC): ICD-10-CM

## 2025-01-27 DIAGNOSIS — R06.83 SNORING: ICD-10-CM

## 2025-01-27 DIAGNOSIS — E11.65 TYPE 2 DIABETES MELLITUS WITH HYPERGLYCEMIA, WITH LONG-TERM CURRENT USE OF INSULIN (HCC): ICD-10-CM

## 2025-01-27 DIAGNOSIS — R06.81 APNEA: ICD-10-CM

## 2025-01-27 DIAGNOSIS — Z79.01 CHRONIC ANTICOAGULATION: ICD-10-CM

## 2025-01-27 DIAGNOSIS — J96.01 ACUTE RESPIRATORY FAILURE WITH HYPOXIA (HCC): ICD-10-CM

## 2025-01-27 DIAGNOSIS — I48.0 PAROXYSMAL ATRIAL FIBRILLATION (HCC): ICD-10-CM

## 2025-01-27 DIAGNOSIS — Z09 HOSPITAL DISCHARGE FOLLOW-UP: ICD-10-CM

## 2025-01-27 PROCEDURE — 99214 OFFICE O/P EST MOD 30 MIN: CPT

## 2025-01-27 PROCEDURE — 3078F DIAST BP <80 MM HG: CPT

## 2025-01-27 PROCEDURE — 3077F SYST BP >= 140 MM HG: CPT

## 2025-01-27 RX ORDER — ACYCLOVIR 800 MG/1
1 TABLET ORAL
Qty: 6 EACH | Refills: 3 | Status: SHIPPED | OUTPATIENT
Start: 2025-01-27 | End: 2025-02-24

## 2025-01-27 RX ORDER — KETOROLAC TROMETHAMINE 30 MG/ML
1 INJECTION, SOLUTION INTRAMUSCULAR; INTRAVENOUS DAILY
Qty: 1 EACH | Refills: 0 | Status: SHIPPED | OUTPATIENT
Start: 2025-01-27 | End: 2025-04-27

## 2025-01-27 RX ORDER — FLUTICASONE PROPIONATE 50 MCG
1 SPRAY, SUSPENSION (ML) NASAL DAILY
Qty: 16 G | Refills: 1 | Status: SHIPPED | OUTPATIENT
Start: 2025-01-27

## 2025-01-27 RX ORDER — ACYCLOVIR 800 MG/1
1 TABLET ORAL
Qty: 6 EACH | Refills: 3 | Status: SHIPPED | OUTPATIENT
Start: 2025-01-27 | End: 2025-01-27

## 2025-01-27 RX ORDER — KETOROLAC TROMETHAMINE 30 MG/ML
1 INJECTION, SOLUTION INTRAMUSCULAR; INTRAVENOUS DAILY
Qty: 1 EACH | Refills: 0 | Status: SHIPPED | OUTPATIENT
Start: 2025-01-27 | End: 2025-01-27

## 2025-01-27 RX ORDER — INSULIN GLARGINE 100 [IU]/ML
15 INJECTION, SOLUTION SUBCUTANEOUS EVERY EVENING
Qty: 15 ML | Refills: 1 | Status: SHIPPED | OUTPATIENT
Start: 2025-01-27

## 2025-01-27 ASSESSMENT — FIBROSIS 4 INDEX: FIB4 SCORE: 1.32

## 2025-01-27 NOTE — PROGRESS NOTES
[facility]     PATIENT ID:  NAME:  Rogelio Padilla  MRN:               [id number]  YOB: 1950    Date: 9:36 AM      Fellow: Narciso Rose M.D.    CC:  Hospital follow up      HPI: Rogelio Padilla is a 74 y.o. male who presented for hospital follow-up.  Patient was admitted to hospital on 1/15 through 1/22 after he was found at home unresponsive and apneic.  Per EMS when patient was rolled over a large piece of food fell out of his mouth and initial saturations of 45%.  Patient was subsequently diagnosed with bacterial pneumonia secondary to initial viral illness versus choking episode.  He was evaluated by GI in hospital and recommended outpatient follow-up for esophageal dysmotility, referral placed at that time.  Throughout hospitalization patient was noted to have nocturnal desaturations concerning for some component of TAYLOR.  Daughter does note that the patient snores loudly and has been witnessed to have apneic episodes during sleep.  Otherwise patient states that he has been back to his baseline state of health since discharge from the hospital.  He states he is not using supplemental oxygen throughout the day but has been using it at night.  He states his blood sugars have been slightly consistently high since discharge and has increased his insulin dose to 15 units nightly.  Daughter notes that they did see clinical pharmacotherapy in the past and were prescribed a GLP-1 but they never received this from pharmacy and have not followed up with clinical pharmacotherapy services.    No problems updated.    REVIEW OF SYSTEMS:   Ten systems reviewed and were negative except as noted in the HPI.                PROBLEM LIST  Patient Active Problem List   Diagnosis    Paroxysmal atrial fibrillation (HCC)    Dyslipidemia    Chronic systolic congestive heart failure (HCC)    Chronic anticoagulation    Acute respiratory failure (HCC)    Pleural effusion    Type 2 diabetes mellitus, with  long-term current use of insulin (HCC)    Oropharyngeal dysphagia    Anemia    Thickening of wall of gallbladder with pericholecystic fluid    Depression        PAST SURGICAL HISTORY:  Past Surgical History:   Procedure Laterality Date    HERNIA REPAIR         FAMILY HISTORY:  Family History   Problem Relation Age of Onset    Heart Attack Mother        SOCIAL HISTORY:   Social History     Tobacco Use    Smoking status: Never     Passive exposure: Current    Smokeless tobacco: Never   Substance Use Topics    Alcohol use: Not Currently     Alcohol/week: 3.6 oz     Types: 6 Cans of beer per week       ALLERGIES:  Allergies   Allergen Reactions    Metformin Diarrhea          Neosporin [Bacitracin-Polymyxin B] Rash          Penicillins Rash      Rash as a teenager, reports tolerating amoxicillin in the past   Tolerated Augmentin Jan 2025       OUTPATIENT MEDICATIONS:    Current Outpatient Medications:     Semaglutide,0.25 or 0.5MG/DOS, 2 MG/3ML Solution Pen-injector, Inject 0.25 mg under the skin every 7 days., Disp: 3 mL, Rfl: 11    Continuous Glucose  (FREESTYLE HALEY 3 READER) Device, 1 Each every day for 90 days. For continuous glucose monitoring, Disp: 1 Each, Rfl: 0    Continuous Glucose Sensor (FREESTYLE HALEY 3 SENSOR) Misc, 1 Each every 14 days for 28 days., Disp: 6 Each, Rfl: 3    insulin glargine (LANTUS SOLOSTAR) 100 UNIT/ML Solution Pen-injector injection, Inject 15 Units under the skin every evening., Disp: 15 mL, Rfl: 1    fluticasone (FLONASE) 50 MCG/ACT nasal spray, Administer 1 Spray into affected nostril(S) every day., Disp: 16 g, Rfl: 1    glucose blood (ONETOUCH ULTRA) strip, 1 Strip by Other route every morning. Or as needed for any symptoms of hypoglygemia or hyperglycemia., Disp: 100 Strip, Rfl: 5    NON SPECIFIED, Administer 1 Dose into affected eye(s). Injections into both eyes every 6 weeks, Disp: , Rfl:     Blood Glucose Test Strips, Use one Embrace pro strip to test blood sugar three  "times daily., Disp: 100 Strip, Rfl: 0    Lancets, Use one Embrace pro lancet to test blood sugar three times daily., Disp: 100 Each, Rfl: 0    Insulin Pen Needle 32 G x 4 mm, Use one pen needle in pen device to inject insulin once daily., Disp: 100 Each, Rfl: 3    atorvastatin (LIPITOR) 40 MG Tab, Take 1 Tablet by mouth every day., Disp: 90 Tablet, Rfl: 3    apixaban (ELIQUIS) 5mg Tab, Take 1 Tablet by mouth 2 times a day., Disp: 180 Tablet, Rfl: 3    Blood Glucose Meter Kit, Test blood sugar as recommended by provider. Embrace pro blood glucose monitoring kit., Disp: 1 Kit, Rfl: 0    Alcohol Swabs, Wipe site with prep pad prior to injection., Disp: 100 Each, Rfl: 2    sertraline (ZOLOFT) 25 MG tablet, Take 1 Tablet by mouth every day., Disp: 30 Tablet, Rfl: 11    amoxicillin-clavulanate (AUGMENTIN) 400-57 MG/5ML Recon Susp suspension, Take 11 mL by mouth every 12 hours for 7 doses. Discard remainder. (Patient not taking: Reported on 1/27/2025), Disp: 100 mL, Rfl: 0    PHYSICAL EXAM:  Vitals:    01/27/25 0922   BP: (!) 150/75   Pulse: 65   Resp: 16   Temp: 36.2 °C (97.1 °F)   TempSrc: Temporal   SpO2: 97%   Weight: 74.4 kg (164 lb)   Height: 1.778 m (5' 10\")       General: Pt resting in NAD, pleasant and cooperative   Skin:  Pink, warm and dry.  HEENT: NC/AT. EOMI. PERRLA, no anterior neck masses or lymphadenopathy  Lungs:  Symmetrical.  CTAB, good air movement, minimal inspiratory crackles, no end expiratory wheeze, no other adventitious sounds  Cardiovascular:  S1/S2 RRR, no murmurs rubs or gallops, no lower extremity edema  Abdomen:  Abdomen is soft, nontender, no distention  Extremities:  Full range of motion.  CNS:  Muscle tone is normal. No gross focal neurologic deficits      ASSESSMENT/PLAN:   74 y.o. male who presents to clinic for hospital follow up.     1. Hospital discharge follow-up  Following up with PCP after recent hospitalization.  Patient states that he has done well since discharge from hospital " "but has had concerns with continued hyperglycemia.  Reports no current concerns but wanted to check in.  - Continuous Glucose  (FREESTYLE HALEY 3 READER) Device; 1 Each every day for 90 days. For continuous glucose monitoring  Dispense: 1 Each; Refill: 0  - Continuous Glucose Sensor (FREESTYLE HALEY 3 SENSOR) Misc; 1 Each every 14 days for 28 days.  Dispense: 6 Each; Refill: 3    2. Paroxysmal atrial fibrillation (HCC)  Stable, continue chronic anticoagulation    3. Dyslipidemia  Stable, continue statin medication    4. Chronic systolic congestive heart failure (HCC)  Stable.  - Referral to Pharmacotherapy Service    5. Chronic anticoagulation  Stable    6. Type 2 diabetes mellitus with hyperglycemia, with long-term current use of insulin ([facility])  Patient notes that SGLT2 was discontinued from his medication regimen given his frequent episodes of lightheadedness so he has been managing his diabetes with basal insulin alone.  He states that he has been continuing to titrate his dose is currently on 15 units nightly but has noted fasting blood glucoses in the morning between 220-280.  Daughter states that she is concerned as his diet is often \"what ever he wants\" so he is not carb counting.  Patient was seen by pharmacotherapy services and prescribed a GLP-1 medication but was never started on it.  Counseled patient and daughter on treatment options and at this time think simplest regimen possible would be beneficial as patient does not seem interested in carb counting or adding complexity to his regimen.  He states that he refuses to take metformin as it does cause him significant diarrhea, even with extended release medication.  At this time we will continue to titrate basal insulin dose as well as plan to start GLP-1 medication.  Will prescribe for Ozempic at this time as patient has Medicare with secondary [place] [employer] and Trulicity was prescribed in the past but they never received the medication.  Will also " provide referral back to pharmacotherapy services so that patient has more points of contact if sliding-scale insulin is needed as he will likely need more in-depth education.  Patient states he understands and agrees with this course.  Will also represcribe freestyle haley as his pharmacy at \[facility]\"" stated they did not have supply.  - Semaglutide,0.25 or 0.5MG/DOS, 2 MG/3ML Solution Pen-injector; Inject 0.25 mg under the skin every 7 days.  Dispense: 3 mL; Refill: 11  - Continuous Glucose  (FREESTYLE HALEY 3 READER) Device; 1 Each every day for 90 days. For continuous glucose monitoring  Dispense: 1 Each; Refill: 0  - Continuous Glucose Sensor (FREESTYLE HALEY 3 SENSOR) Misc; 1 Each every 14 days for 28 days.  Dispense: 6 Each; Refill: 3  - Referral to Pharmacotherapy Service  - insulin glargine (LANTUS SOLOSTAR) 100 UNIT/ML Solution Pen-injector injection; Inject 15 Units under the skin every evening.  Dispense: 15 mL; Refill: 1    7. Acute respiratory failure with hypoxia (HCC)  Per cardiology follow-up concerned that patient's shortness of breath out of proportion to cardiac history, will consider referral to pulmonology or PFTs depending on sleep study testing and if any need for supplemental oxygen throughout the day in the future.  - Referral to Pulmonary and Sleep Medicine    8. Snoring  Patient does have elevated STOP-BANG score today with witnessed snoring and episodes of apnea by family at home as well as witnessed desaturations throughout the night while in hospital.  Will provide a referral to sleep medicine at this time for further evaluation.  - Referral to Pulmonary and Sleep Medicine    9. Apnea  See above #snoring  - Referral to Pulmonary and Sleep Medicine      Problem List Items Addressed This Visit       Paroxysmal atrial fibrillation (HCC)    Dyslipidemia    Chronic systolic congestive heart failure (HCC)    Relevant Orders    Referral to Pharmacotherapy Service    Chronic  anticoagulation    Acute respiratory failure (HCC)    Relevant Medications    insulin glargine (LANTUS SOLOSTAR) 100 UNIT/ML Solution Pen-injector injection    Other Relevant Orders    Referral to Pulmonary and Sleep Medicine    Type 2 diabetes mellitus, with long-term current use of insulin (HCC)    Relevant Medications    Semaglutide,0.25 or 0.5MG/DOS, 2 MG/3ML Solution Pen-injector    Continuous Glucose  (FREESTYLE HALEY 3 READER) Device    Continuous Glucose Sensor (FREESTYLE HALEY 3 SENSOR) Misc    insulin glargine (LANTUS SOLOSTAR) 100 UNIT/ML Solution Pen-injector injection    Other Relevant Orders    Referral to Pharmacotherapy Service     Other Visit Diagnoses       Hospital discharge follow-up        Relevant Medications    Continuous Glucose  (FREESTYLE HALEY 3 READER) Device    Continuous Glucose Sensor (FREESTYLE HALEY 3 SENSOR) Misc    Snoring        Relevant Orders    Referral to Pulmonary and Sleep Medicine    Apnea        Relevant Orders    Referral to Pulmonary and Sleep Medicine            Narciso Rose M.D.  PGY-4, Wilderness Fellow  [facility]

## 2025-01-31 ENCOUNTER — TELEPHONE (OUTPATIENT)
Dept: CARDIOLOGY | Facility: MEDICAL CENTER | Age: 75
End: 2025-01-31
Payer: MEDICARE

## 2025-02-04 ENCOUNTER — TELEPHONE (OUTPATIENT)
Dept: CARDIOLOGY | Facility: MEDICAL CENTER | Age: 75
End: 2025-02-04
Payer: MEDICARE

## 2025-02-04 NOTE — TELEPHONE ENCOUNTER
Last OV: 1.23.2025  Proposed Surgery: Transperineal biopsy   Surgery Date: 2.21.2025  Requesting Office Name: [facility] [place]  Fax Number: [phone]  Preference of Location (default is surgery center unless specified by Cardiologist or [facility])  Prior Clearance Addressed: No    Is this a general clearance? YES   Anticoags/Antiplatelets: Apixaban   Anticoags/Antiplatelet managed by Cardiology? YES    Outstanding Cardiac Imaging : No  Ablation, Cardioversion, Stent, Cardiac Devices, Catheterization, Watchman: No  TAVR/Valve, Mitral Clip, Watchman (including open heart),: N/A   Recent Cardiac Hospitalization: No            When: N/A  History (cardiac history):   Past Medical History:   Diagnosis Date    Atrial fibrillation (HCC)     CHF (congestive heart failure) (HCC)     Diabetes mellitus type 2, noninsulin dependent (HCC)     Hyperlipidemia     Hypertension            Is this a dental clearance? NO  Ablation, Cardioversion, Watchman, Stents, Cath, Devices within the last 3 months? No   If yes- Send dental wait letter, do not forward to provider for review.     TAVR / Valve, Mitral clip within the last 6 months? No  If yes- Send dental wait letter, do not forward to provider for review.     If completing a general clearance, continue per protocol.           Surgical Clearance Letter Sent: YES   **Scan clearance request letter into moziy.**

## 2025-02-04 NOTE — LETTER
PROCEDURE/SURGERY CLEARANCE FORM      Encounter Date: 2/4/2025    Patient: Rogelio Padilla  YOB: 1950    CARDIOLOGIST: Rupesh Bermeo M.D.     REFERRING DOCTOR:  No ref. provider found    Procedure/Surgery: Transperineal biopsy     Dear Surgeon or Proceduralist,      Thank you for your request for cardiac stratification of our mutual patient Rogelio Padilla 1950. We have reviewed their [facility] records; and to the best of our understanding this patient has not had stenting, ablation, watchman, cardiothoracic surgery or hospitalization for cardiovascular reasons in the past 6 months.  Rogelio Padilla has been seen within the past 15 months and is considered to have non-modifiable cardiac risk for this low-risk procedure/surgery. They may proceed from a cardiovascular standpoint and may hold their antiplatelet/anticoagulation as briefly as possible. Please have patient resume this medication when hemodynamically stable to do so.     Aspirin or Prasugrel   - hold 7 days prior to procedure/surgery, resume when hemodynamically stable      Clopidrogrel or Ticagrelor  - hold 7 days for all neurological procedures, hold 5 days prior to all other procedure/surgery,  resume when hemodynamically stable     Warfarin - hold 7 days for all neurological procedures, hold 5 days prior to all other procedure/surgery and coordinate with [facility] ([phone]) INR testing and dose management.      Pradaxa/Xarelto/Eliquis/Savesya - hold 1 day prior to procedure for low bleeding risk procedure, 2 days for high bleeding risk procedure, or consider holding 3 days or longer for patients with reduced kidney function (CrCl <30mL/min) or spinal/cranial surgeries/procedures.      If they have a mechanical heart valve, please coordinate with [facility]ticoagulation Service ([phone]) the proper management of their anticoagulant in the periprocedural or perioperative period.      Some  patients have higher risk for cardiovascular complications or holding medication. If our patient has had prior complications of holding antiplatelet or anticoagulants in the past and we have seen them after these events, we have addressed these concerns with the patient. They are at an unknown degree of increased risk for recurrent complication.  You may hold anticoagulation/antiplatelets for the procedure or surgery if the benefits of the procedure or surgery outweigh this nonmodifiable risk.      If Rogelio Padilla 1950 has new symptoms of heart failure decompensation, unstable arrythmia, or angina please reach out and we will assess the patient.      If you have other patient-specific concerns, please feel free to reach out to the patient's cardiologist directly at [phone].     Thank you,       [facility]                                       Rupesh Bermeo M.D.   Electronically Signed

## 2025-02-05 ENCOUNTER — TELEPHONE (OUTPATIENT)
Dept: HEALTH INFORMATION MANAGEMENT | Facility: OTHER | Age: 75
End: 2025-02-05
Payer: MEDICARE

## 2025-02-10 ENCOUNTER — OFFICE VISIT (OUTPATIENT)
Dept: SLEEP MEDICINE | Facility: MEDICAL CENTER | Age: 75
End: 2025-02-10
Attending: INTERNAL MEDICINE
Payer: MEDICARE

## 2025-02-10 VITALS
SYSTOLIC BLOOD PRESSURE: 130 MMHG | HEIGHT: 70 IN | WEIGHT: 158 LBS | DIASTOLIC BLOOD PRESSURE: 62 MMHG | BODY MASS INDEX: 22.62 KG/M2 | HEART RATE: 72 BPM | OXYGEN SATURATION: 91 % | RESPIRATION RATE: 12 BRPM

## 2025-02-10 DIAGNOSIS — G47.33 OSA (OBSTRUCTIVE SLEEP APNEA): ICD-10-CM

## 2025-02-10 DIAGNOSIS — J96.11 CHRONIC RESPIRATORY FAILURE WITH HYPOXIA (HCC): ICD-10-CM

## 2025-02-10 PROCEDURE — 99213 OFFICE O/P EST LOW 20 MIN: CPT | Performed by: INTERNAL MEDICINE

## 2025-02-10 PROCEDURE — 94761 N-INVAS EAR/PLS OXIMETRY MLT: CPT | Performed by: INTERNAL MEDICINE

## 2025-02-10 ASSESSMENT — ENCOUNTER SYMPTOMS
MUSCULOSKELETAL NEGATIVE: 1
EYES NEGATIVE: 1
CARDIOVASCULAR NEGATIVE: 1
PSYCHIATRIC NEGATIVE: 1
GASTROINTESTINAL NEGATIVE: 1
NEUROLOGICAL NEGATIVE: 1
CONSTITUTIONAL NEGATIVE: 1
RESPIRATORY NEGATIVE: 1

## 2025-02-10 ASSESSMENT — FIBROSIS 4 INDEX: FIB4 SCORE: 1.32

## 2025-02-10 NOTE — PROCEDURES
Multi-Ox Readings  Multi Ox #1 Room air   O2 sat % at rest 96   O2 sat % on exertion 92   O2 sat average on exertion     Multi Ox #2     O2 sat % at rest     O2 sat % on exertion     O2 sat average on exertion       Oxygen Use     Oxygen Frequency     Duration of need     Is the patient mobile within the home?     CPAP Use?     BIPAP Use?     Servo Titration

## 2025-02-10 NOTE — ASSESSMENT & PLAN NOTE
STOP BANG score 3    _x___ Discussed the pathophysiology of sleep disordered breathing including risks for hypertension, heart attack, and stroke   Also discussed treatment options including CPAP, an oral appliance, and surgical intervention.   __x___ Discussed weight control program with goal of achieving and maintaining ideal body weight.   __x___ Polysomnography was discussed and ordered HST  __x___Discussed weight control programs with goal of achieving and maintaining ideal body weight   __x___Discussed the diagnosis , management and pathophysiology TAYLOR   _x___Discussed the risks associated with driving and operating equipment while drowsy. The patient verbalized an understanding of this and agreed to take appropriate measures to eliminate these risks.   __x___Discussed the cardiovascular and neuropsychiatric risks of untreated TAYLOR; including but not limited to: HTN, DM, MI, ASCVD, CVA, CHF, traffic accidents   __x___ Discussed positive airway pressure (PAP) as the preferred therapy for severe TAYLOR;   _____ Discussed risks of anesthesia/analgesia associated with TAYLOR and the need to use PAP in the pre, cassie, and post -operative periods.

## 2025-02-10 NOTE — ASSESSMENT & PLAN NOTE
O2 needs have resolved  Assume infiltrates and effusion also resolved  CT chest to assess  Occasional cough

## 2025-02-10 NOTE — PROGRESS NOTES
Pulmonary Clinic follow up    Date of Service: 2/10/2025    Reason for follow up:  Hospital Follow-up (ED 01/16/25-01/22/25. Pulm. Consult: 01/19/25/)      Problem List Items Addressed This Visit          Pulmonary/Sleep Medicine Problems    TAYLOR (obstructive sleep apnea)     STOP BANG score 3    _x___ Discussed the pathophysiology of sleep disordered breathing including risks for hypertension, heart attack, and stroke   Also discussed treatment options including CPAP, an oral appliance, and surgical intervention.   __x___ Discussed weight control program with goal of achieving and maintaining ideal body weight.   __x___ Polysomnography was discussed and ordered HST  __x___Discussed weight control programs with goal of achieving and maintaining ideal body weight   __x___Discussed the diagnosis , management and pathophysiology TAYLOR   _x___Discussed the risks associated with driving and operating equipment while drowsy. The patient verbalized an understanding of this and agreed to take appropriate measures to eliminate these risks.   __x___Discussed the cardiovascular and neuropsychiatric risks of untreated TAYLOR; including but not limited to: HTN, DM, MI, ASCVD, CVA, CHF, traffic accidents   __x___ Discussed positive airway pressure (PAP) as the preferred therapy for severe TAYLOR;   _____ Discussed risks of anesthesia/analgesia associated with TAYLOR and the need to use PAP in the pre, cassie, and post -operative periods.          Relevant Orders    Overnight Home Sleep Study       Other    Chronic respiratory failure with hypoxia (HCC)     O2 needs have resolved  Assume infiltrates and effusion also resolved  CT chest to assess  Occasional cough         Relevant Orders    Multiple Oximetry    CT-CHEST (THORAX) W/O         History of Present Illness: Rogelio Padilla is a 74 y.o. male with a past medical history of afib on eliquis, HTN and hyperlipidemia, esophageal dysmotility, DM type II  Was hospitalized on 1/15 to 1/22  after choking incident and AMS  Dr. Amaro saw pt at that time for hypoxic  respiratory failure, abnormal CT chest with b/l infiltrates and small right pleural effusion  There was concern for esophageal dysmotility and aspiration  PEG was recommended which pt declined  10 day course of Abx was recommended  Pt here for follow up    Multiox done   Stopped oxygen using 3 days ago as feels well  He has been eating small bites  Occasional cough mainly in am  Feels good  Put on some weight    Multi-Ox Readings  Multi Ox #1 Room air   O2 sat % at rest 96   O2 sat % on exertion 92   O2 sat average on exertion            Review of Systems   Constitutional: Negative.    HENT: Negative.     Eyes: Negative.    Respiratory: Negative.     Cardiovascular: Negative.    Gastrointestinal: Negative.    Genitourinary: Negative.    Musculoskeletal: Negative.    Skin: Negative.    Neurological: Negative.    Endo/Heme/Allergies: Negative.    Psychiatric/Behavioral: Negative.         Current Outpatient Medications on File Prior to Visit   Medication Sig Dispense Refill    Continuous Glucose  (FREESTYLE HALEY 3 READER) Device 1 Each every day for 90 days. For continuous glucose monitoring 1 Each 0    Continuous Glucose Sensor (FREESTYLE HALEY 3 SENSOR) Misc 1 Each every 14 days for 28 days. 6 Each 3    insulin glargine (LANTUS SOLOSTAR) 100 UNIT/ML Solution Pen-injector injection Inject 15 Units under the skin every evening. 15 mL 1    fluticasone (FLONASE) 50 MCG/ACT nasal spray Administer 1 Spray into affected nostril(S) every day. 16 g 1    glucose blood (ONETOUCH ULTRA) strip 1 Strip by Other route every morning. Or as needed for any symptoms of hypoglygemia or hyperglycemia. 100 Strip 5    NON SPECIFIED Administer 1 Dose into affected eye(s). Injections into both eyes every 6 weeks      Blood Glucose Test Strips Use one Embrace pro strip to test blood sugar three times daily. 100 Strip 0    Lancets Use one Embrace pro lancet to  "test blood sugar three times daily. 100 Each 0    Insulin Pen Needle 32 G x 4 mm Use one pen needle in pen device to inject insulin once daily. 100 Each 3    atorvastatin (LIPITOR) 40 MG Tab Take 1 Tablet by mouth every day. 90 Tablet 3    apixaban (ELIQUIS) 5mg Tab Take 1 Tablet by mouth 2 times a day. 180 Tablet 3    Blood Glucose Meter Kit Test blood sugar as recommended by provider. Embrace pro blood glucose monitoring kit. 1 Kit 0    Alcohol Swabs Wipe site with prep pad prior to injection. 100 Each 2    sertraline (ZOLOFT) 25 MG tablet Take 1 Tablet by mouth every day. 30 Tablet 11    Semaglutide,0.25 or 0.5MG/DOS, 2 MG/3ML Solution Pen-injector Inject 0.25 mg under the skin every 7 days. (Patient not taking: Reported on 2/10/2025) 3 mL 11     No current facility-administered medications on file prior to visit.       Social History     Tobacco Use    Smoking status: Never     Passive exposure: Current    Smokeless tobacco: Never   Vaping Use    Vaping status: Never Used   Substance Use Topics    Alcohol use: Not Currently     Alcohol/week: 3.6 oz     Types: 6 Cans of beer per week    Drug use: Never        Past Medical History:   Diagnosis Date    Atrial fibrillation (HCC)     CHF (congestive heart failure) (HCC)     Cough     Diabetes mellitus type 2, noninsulin dependent (HCC)     Hyperlipidemia     Hypertension     Shortness of breath     Sputum production     Wheezing        Past Surgical History:   Procedure Laterality Date    HERNIA REPAIR         Allergies: Metformin, Neosporin [bacitracin-polymyxin b], and Penicillins    Family History   Problem Relation Age of Onset    Heart Attack Mother        Vitals:    02/10/25 0926   Height: 1.778 m (5' 10\")   Weight: 71.7 kg (158 lb)   Weight % change since last entry.: 0 %   BP: 130/62   Pulse: 72   BMI (Calculated): 22.67   Resp: 12       Physical Examination  Physical Exam  HENT:      Head: Normocephalic and atraumatic.   Eyes:      Pupils: Pupils are equal, " round, and reactive to light.   Cardiovascular:      Rate and Rhythm: Normal rate.   Pulmonary:      Effort: Pulmonary effort is normal.      Breath sounds: No wheezing or rales.   Musculoskeletal:         General: Normal range of motion.      Cervical back: Normal range of motion.   Skin:     General: Skin is warm and dry.   Neurological:      General: No focal deficit present.      Mental Status: He is alert and oriented to person, place, and time.   Psychiatric:         Mood and Affect: Mood normal.         Behavior: Behavior normal.         Thought Content: Thought content normal.         Judgment: Judgment normal.                  Osbaldo Varela M.D., MD MPH [facility]  [facility]

## 2025-02-18 ENCOUNTER — APPOINTMENT (OUTPATIENT)
Dept: CARDIOLOGY | Facility: MEDICAL CENTER | Age: 75
End: 2025-02-18
Attending: INTERNAL MEDICINE
Payer: MEDICARE

## 2025-02-18 ENCOUNTER — TELEPHONE (OUTPATIENT)
Dept: HEALTH INFORMATION MANAGEMENT | Facility: OTHER | Age: 75
End: 2025-02-18
Payer: MEDICARE

## 2025-02-19 ENCOUNTER — HOME STUDY (OUTPATIENT)
Dept: SLEEP MEDICINE | Facility: MEDICAL CENTER | Age: 75
End: 2025-02-19
Attending: INTERNAL MEDICINE
Payer: MEDICARE

## 2025-02-19 DIAGNOSIS — G47.33 OSA (OBSTRUCTIVE SLEEP APNEA): ICD-10-CM

## 2025-02-19 DIAGNOSIS — G47.34 NOCTURNAL HYPOXEMIA: ICD-10-CM

## 2025-02-19 PROCEDURE — G0400 HOME SLEEP TEST/TYPE 4 PORTA: HCPCS | Performed by: STUDENT IN AN ORGANIZED HEALTH CARE EDUCATION/TRAINING PROGRAM

## 2025-02-22 ENCOUNTER — HOSPITAL ENCOUNTER (OUTPATIENT)
Dept: RADIOLOGY | Facility: MEDICAL CENTER | Age: 75
End: 2025-02-22
Attending: INTERNAL MEDICINE
Payer: MEDICARE

## 2025-02-22 DIAGNOSIS — J96.11 CHRONIC RESPIRATORY FAILURE WITH HYPOXIA (HCC): ICD-10-CM

## 2025-02-22 PROCEDURE — 71250 CT THORAX DX C-: CPT

## 2025-02-23 DIAGNOSIS — E11.42 POORLY CONTROLLED TYPE 2 DIABETES MELLITUS WITH PERIPHERAL NEUROPATHY (HCC): ICD-10-CM

## 2025-02-23 DIAGNOSIS — E11.65 POORLY CONTROLLED TYPE 2 DIABETES MELLITUS WITH PERIPHERAL NEUROPATHY (HCC): ICD-10-CM

## 2025-02-27 NOTE — TELEPHONE ENCOUNTER
Received request via: Pharmacy    Was the patient seen in the last year in this department? Yes    Does the patient have an active prescription (recently filled or refills available) for medication(s) requested? No    Pharmacy Name: [facility] [id number] - [place] - [address] [[id number]]     Does the patient have FDC Plus and need 100-day supply? (This applies to ALL medications) Patient does not have SCP

## 2025-03-03 RX ORDER — LANCETS 33 GAUGE
EACH MISCELLANEOUS
Qty: 100 EACH | Refills: 5 | Status: SHIPPED | OUTPATIENT
Start: 2025-03-03

## 2025-03-03 NOTE — PROCEDURES
DIAGNOSTIC HOME SLEEP TEST (HST) REPORT WatchPAT      PATIENT ID:  NAME:  Rogelio Padilla  MRN:               [id number]  YOB: 1950  DATE OF STUDY: 2/19/2025      Impression:     This study shows evidence of:      1.  Very severe obstructive sleep apnea with PAT apnea hypopnea index (4% pAHI) of 60.5 per hour.  PAT respiratory disturbance index (pRDI) was 64.6 per hour. These findings are based on 7 channels recording of PAT signal with sleep staging, heart rate, pulse oximetry, actigraphy, body position, snoring and respiratory movement.  NELDA: 13.2 events per hour.  Patient also had evidence of 17.9% Cheyne-Horvath respirations.    2. Oxygenation O2 Sat. mean O2 sat was 84%,  dhara was 53%,  and maximum O2 at 95%. O2 sat was at or  below 88% for 330.9 min of evaluation time. Oxygen Desaturation (>=4%) Index was 61.2/hr. AVG HR was 68 BPM.      TECHNICAL DESCRIPTION: Patient underwent home sleep apnea testing with peripheral arterial tone signal (WatchPAT™). This is a Type IV portable monitor and device per Medicare. Monitoring was done with 7 channels recording of PAT signal with sleep staging, heart rate, pulse oximetry, actigraphy, body position, snoring and respiratory movement. Prior to using the device, the patient received verbal and written instructions for its application and was provided with the help desk phone number for additional telephonic instruction with 24-hour availability of qualified personnel to answer questions.    Respiratory events:        General sleep summary: . Total recording time is 8 hours and 21 minutes and total Sleep time is 7 hours and 35 minutes. The patient spent 266 minutes in the supine position and 184.8 minutes in the nonsupine position.      Recommendations:    1. Given severity of obstructive sleep apnea (with component of CSA-CSB) and nocturnal hypoxemia, patient would benefit from undergoing in lab polysomnography CPAP/BiPAP titration study. There  is the potential patient may require more advanced modes of PAP therapy or supplemental oxygen with PAP therapy which is best assessed in the sleep lab.     2.  In general patients with sleep apnea are advised to avoid alcohol, sedatives and not to operate a motor vehicle while drowsy. Untreated sleep apnea increases the risk for cardiovascular and neurovascular disease.            Marge Renteria MD

## 2025-03-12 ENCOUNTER — OFFICE VISIT (OUTPATIENT)
Dept: SLEEP MEDICINE | Facility: MEDICAL CENTER | Age: 75
End: 2025-03-12
Attending: INTERNAL MEDICINE
Payer: MEDICARE

## 2025-03-12 ENCOUNTER — PATIENT MESSAGE (OUTPATIENT)
Dept: SLEEP MEDICINE | Facility: MEDICAL CENTER | Age: 75
End: 2025-03-12

## 2025-03-12 VITALS
BODY MASS INDEX: 23.19 KG/M2 | SYSTOLIC BLOOD PRESSURE: 118 MMHG | HEART RATE: 69 BPM | WEIGHT: 162 LBS | OXYGEN SATURATION: 94 % | HEIGHT: 70 IN | DIASTOLIC BLOOD PRESSURE: 60 MMHG

## 2025-03-12 DIAGNOSIS — G47.33 OSA (OBSTRUCTIVE SLEEP APNEA): ICD-10-CM

## 2025-03-12 PROCEDURE — 3078F DIAST BP <80 MM HG: CPT | Performed by: INTERNAL MEDICINE

## 2025-03-12 PROCEDURE — 94761 N-INVAS EAR/PLS OXIMETRY MLT: CPT | Performed by: INTERNAL MEDICINE

## 2025-03-12 PROCEDURE — 3074F SYST BP LT 130 MM HG: CPT | Performed by: INTERNAL MEDICINE

## 2025-03-12 PROCEDURE — 99213 OFFICE O/P EST LOW 20 MIN: CPT | Mod: 25 | Performed by: INTERNAL MEDICINE

## 2025-03-12 PROCEDURE — 99211 OFF/OP EST MAY X REQ PHY/QHP: CPT | Performed by: INTERNAL MEDICINE

## 2025-03-12 ASSESSMENT — FIBROSIS 4 INDEX: FIB4 SCORE: 1.32

## 2025-03-12 NOTE — PROGRESS NOTES
"Pulmonary Clinic follow up    Date of Service: 3/12/2025    Reason for follow up:  Follow-Up (Last seen 2/10/25 w/ Dr. Cleary for TAYLOR ) and Results (Overnight Home Sleep Study 2/19/25, CT-Chest 2/22/25 )      Problem List Items Addressed This Visit          Pulmonary/Sleep Medicine Problems    TAYLOR (obstructive sleep apnea)    Very severe sleep apnea with AHI 60.5/hr with O2 dhara of 53%  _x___ Discussed the pathophysiology of sleep disordered breathing including risks for hypertension, heart attack, and stroke   Also discussed treatment options including CPAP, an oral appliance, and surgical intervention.   __x___ Polysomnography was discussed and ordered inlab titration due to severe hypoxemia at night   Cardiac risk is high for untreated TAYLOR  _x__       follow up post sleep study pt has set up appt in [place] as he is moving within next two weeks  __x___Discussed the diagnosis , management and pathophysiology TAYLOR   _x___Discussed the risks associated with driving and operating equipment while drowsy. The patient verbalized an understanding of this and agreed to take appropriate measures to eliminate these risks.   __x___Discussed the cardiovascular and neuropsychiatric risks of untreated TAYLOR; including but not limited to: HTN, DM, MI, ASCVD, CVA, CHF, traffic accidents   __x___ Discussed positive airway pressure (PAP) as the preferred therapy for severe TAYLOR;   _____ Discussed risks of anesthesia/analgesia associated with TAYLOR and the need to use PAP in the pre, cassie, and post -operative periods.          Relevant Orders    Polysomnography Titration    Multiple Oximetry         History of Present Illness: Rogelio Padilla is a 74 y.o. male with a past medical history of very severe TAYLOR on HST  done 2/19/2025 AHI 60.5/hr O2 lozoya 53% with 330.9 minutes less than or = to 88%.Additionally, \"NELDA: 13.2 events per hour.  Patient also had evidence of 17.9% Cheyne-Horvath respirations. \"  He has hx of afib on eliquis, " HTN and hyperlipidemia, esophageal dysmotility, DM type II   I last saw pt on 2/10/2025 and [facility] a sleep study [facility] on 2/23/2025, effusions resolved an dinfiltrates almost resolved    Today daytime o2 needs resolved    Multi-Ox Readings  Multi Ox #1 Room air   O2 sat % at rest 94   O2 sat % on exertion 90   O2 sat average on exertion               Review of Systems   Constitutional: Negative.    HENT: Negative.     Eyes: Negative.    Respiratory: Negative.     Cardiovascular: Negative.    Gastrointestinal: Negative.    Genitourinary: Negative.    Musculoskeletal: Negative.    Skin: Negative.    Neurological: Negative.    Endo/Heme/Allergies: Negative.    Psychiatric/Behavioral: Negative.         Current Outpatient Medications on File Prior to Visit   Medication Sig Dispense Refill    Lancets (ONETOUCH DELICA PLUS [id number]) Misc USE 1 LANCET TO TEST BLOOD SUGAR 3 TIMES A  Each 5    Lancets Use one Embrace pro lancet to test blood sugar three times daily. 100 Each 0    Semaglutide,0.25 or 0.5MG/DOS, 2 MG/3ML Solution Pen-injector Inject 0.25 mg under the skin every 7 days. 3 mL 11    Continuous Glucose  (FREESTYLE HALEY 3 READER) Device 1 Each every day for 90 days. For continuous glucose monitoring 1 Each 0    insulin glargine (LANTUS SOLOSTAR) 100 UNIT/ML Solution Pen-injector injection Inject 15 Units under the skin every evening. 15 mL 1    fluticasone (FLONASE) 50 MCG/ACT nasal spray Administer 1 Spray into affected nostril(S) every day. 16 g 1    glucose blood (ONETOUCH ULTRA) strip 1 Strip by Other route every morning. Or as needed for any symptoms of hypoglygemia or hyperglycemia. 100 Strip 5    NON SPECIFIED Administer 1 Dose into affected eye(s). Injections into both eyes every 6 weeks      Blood Glucose Test Strips Use one Embrace pro strip to test blood sugar three times daily. 100 Strip 0    Insulin Pen Needle 32 G  "x 4 mm Use one pen needle in pen device to inject insulin once daily. 100 Each 3    atorvastatin (LIPITOR) 40 MG Tab Take 1 Tablet by mouth every day. 90 Tablet 3    apixaban (ELIQUIS) 5mg Tab Take 1 Tablet by mouth 2 times a day. 180 Tablet 3    Blood Glucose Meter Kit Test blood sugar as recommended by provider. Embrace pro blood glucose monitoring kit. 1 Kit 0    Alcohol Swabs Wipe site with prep pad prior to injection. 100 Each 2    sertraline (ZOLOFT) 25 MG tablet Take 1 Tablet by mouth every day. 30 Tablet 11     No current facility-administered medications on file prior to visit.       Social History     Tobacco Use    Smoking status: Never     Passive exposure: Current    Smokeless tobacco: Never   Vaping Use    Vaping status: Never Used   Substance Use Topics    Alcohol use: Not Currently     Alcohol/week: 3.6 oz     Types: 6 Cans of beer per week    Drug use: Never        Past Medical History:   Diagnosis Date    Atrial fibrillation (HCC)     CHF (congestive heart failure) (HCC)     Cough     Diabetes mellitus type 2, noninsulin dependent (HCC)     Hyperlipidemia     Hypertension     Shortness of breath     Sputum production     Wheezing        Past Surgical History:   Procedure Laterality Date    HERNIA REPAIR         Allergies: Metformin, Neosporin [bacitracin-polymyxin b], and Penicillins    Family History   Problem Relation Age of Onset    Heart Attack Mother        Vitals:    03/12/25 0930   Height: 1.778 m (5' 10\")   Weight: 73.5 kg (162 lb)   Weight % change since last entry.: 0 %   BP: 118/60   Pulse: 69   BMI (Calculated): 23.24       Physical Examination  Physical Exam  HENT:      Head: Normocephalic and atraumatic.      Mouth/Throat:      Comments: edentulous  Eyes:      Extraocular Movements: Extraocular movements intact.      Pupils: Pupils are equal, round, and reactive to light.   Cardiovascular:      Rate and Rhythm: Normal rate.   Pulmonary:      Effort: Pulmonary effort is normal. No " respiratory distress.      Breath sounds: No wheezing.   Musculoskeletal:         General: Normal range of motion.      Cervical back: Normal range of motion.   Skin:     General: Skin is warm and dry.   Neurological:      Mental Status: He is alert and oriented to person, place, and time. Mental status is at baseline.   Psychiatric:         Mood and Affect: Mood normal.         Behavior: Behavior normal.         Thought Content: Thought content normal.         Judgment: Judgment normal.                   Osbaldo Varela M.D., MD MPH [facility]  [facility]

## 2025-03-12 NOTE — PROCEDURES
Multi-Ox Readings  Multi Ox #1 Room air   O2 sat % at rest 94   O2 sat % on exertion 90   O2 sat average on exertion     Multi Ox #2     O2 sat % at rest     O2 sat % on exertion     O2 sat average on exertion       Oxygen Use     Oxygen Frequency     Duration of need     Is the patient mobile within the home?     CPAP Use?     BIPAP Use?     Servo Titration

## 2025-03-13 ENCOUNTER — SLEEP STUDY (OUTPATIENT)
Dept: SLEEP MEDICINE | Facility: MEDICAL CENTER | Age: 75
End: 2025-03-13
Attending: INTERNAL MEDICINE
Payer: MEDICARE

## 2025-03-13 DIAGNOSIS — G47.33 OSA (OBSTRUCTIVE SLEEP APNEA): ICD-10-CM

## 2025-03-13 PROBLEM — J96.00 ACUTE RESPIRATORY FAILURE (HCC): Status: RESOLVED | Noted: 2025-01-15 | Resolved: 2025-03-13

## 2025-03-13 PROBLEM — J90 PLEURAL EFFUSION: Status: RESOLVED | Noted: 2025-01-15 | Resolved: 2025-03-13

## 2025-03-13 PROBLEM — J96.11 CHRONIC RESPIRATORY FAILURE WITH HYPOXIA (HCC): Status: RESOLVED | Noted: 2025-02-10 | Resolved: 2025-03-13

## 2025-03-13 PROCEDURE — 95811 POLYSOM 6/>YRS CPAP 4/> PARM: CPT | Performed by: STUDENT IN AN ORGANIZED HEALTH CARE EDUCATION/TRAINING PROGRAM

## 2025-03-13 ASSESSMENT — ENCOUNTER SYMPTOMS
EYES NEGATIVE: 1
CONSTITUTIONAL NEGATIVE: 1
NEUROLOGICAL NEGATIVE: 1
GASTROINTESTINAL NEGATIVE: 1
PSYCHIATRIC NEGATIVE: 1
CARDIOVASCULAR NEGATIVE: 1
RESPIRATORY NEGATIVE: 1
MUSCULOSKELETAL NEGATIVE: 1

## 2025-03-13 NOTE — ASSESSMENT & PLAN NOTE
Very severe sleep apnea with AHI 60.5/hr with O2 dhara of 53%  _x___ Discussed the pathophysiology of sleep disordered breathing including risks for hypertension, heart attack, and stroke   Also discussed treatment options including CPAP, an oral appliance, and surgical intervention.   __x___ Polysomnography was discussed and ordered inlab titration due to severe hypoxemia at night   Cardiac risk is high for untreated TAYLOR  _x__       follow up post sleep study pt has set up appt in [place] as he is moving within next two weeks  __x___Discussed the diagnosis , management and pathophysiology TAYLOR   _x___Discussed the risks associated with driving and operating equipment while drowsy. The patient verbalized an understanding of this and agreed to take appropriate measures to eliminate these risks.   __x___Discussed the cardiovascular and neuropsychiatric risks of untreated TAYLOR; including but not limited to: HTN, DM, MI, ASCVD, CVA, CHF, traffic accidents   __x___ Discussed positive airway pressure (PAP) as the preferred therapy for severe TAYLOR;   _____ Discussed risks of anesthesia/analgesia associated with TAYLOR and the need to use PAP in the pre, cassie, and post -operative periods.

## 2025-03-14 NOTE — PROCEDURES
Patient: DIVYA PRINCE  ID: [id number] Date: 3/13/2025   MONTAGE: Standard  STUDY TYPE: Treatment  RECORDING TECHNIQUE:   After the scalp was prepared, gold plated electrodes were applied to the scalp according to the International 10-20 System. EEG (electroencephalogram) was continuously monitored from the O1-M2, O2-M1, C3-M2, C4-M1, F3-M2, and F4-M1. EOGs (electrooculograms) were monitored by electrodes placed at the left and right outer canthi. Chin EMG (electromyogram) was monitored by electrodes placed on the mentalis and sub-mentalis muscles. Nasal and oral airflow were monitored using a triple port thermocouple as well as oronasal pressure transducer. Respiratory effort was measured by inductive plethysmography technology employing abdominal and thoracic belts. Blood oxygen saturation and pulse were monitored by pulse oximetry. Heart rhythm was monitored by surface electrocardiogram. Leg EMG was studied using surface electrodes placed on left and right anterior tibialis. A microphone was used to monitor tracheal sounds and snoring. Body position was monitored and documented by technician observation.   SCORING CRITERIA:   A modification of the AASM manual for scoring of sleep and associated events was used. Obstructive apneas were scored by cessation of airflow for at least 10 seconds with continuing respiratory effort. Central apneas were scored by cessation of airflow for at least 10 seconds with no respiratory effort. Hypopneas were scored by a 30% or more reduction in airflow for at least 10 seconds accompanied by arterial oxygen desaturation of 3% or an arousal. For CMS (Medicare) patients, per AASM rule 1B, hypopneas are scored by 30% with mild reduction in airflow for at least 10 seconds accompanied by arterial saturation decreased at 4%.  Study start time was 08:55:43 PM. Diagnostic recording time was 9h 32.0m with a total sleep time of 6h 10.0m resulting in a sleep efficiency of 64.69%%. Sleep  latency from the start of the study was 14 minutes and the latency from sleep to REM was 98 minutes. In total,401 arousals were scored for an arousal index of 65.0.  Respiratory:  There were a total of 108 apneas consisting of 26 obstructive apneas, 0 mixed apneas, and 82 central apneas. A total of 73 hypopneas were scored. The apnea index was 17.51 per hour and the hypopnea index was 11.84 per hour resulting in an overall AHI of 29.35. AHI during REM was 7.8 and AHI while supine was 29.35.  Central respiratory events accounted for 58.8% of total events  Oximetry:  There was a mean oxygen saturation of 92.0%. The minimum oxygen saturation in NREM was 50.0 % and in REM was 50.0%. The patient spent 69.1 minutes of TST with SaO2 <88%.  Cardiac:  The highest heart rate seen while awake was 97 BPM while the highest heart rate during sleep was 78 BPM with an average sleeping heart rate of 64 BPM.  Limb Movements:  There were a total of 97 PLMs during sleep which resulted in a PLMS index of 15.7. Of these, 125 were associated with arousals which resulted in a PLMS arousal index of 20.3.  Titration: CPAP was tried from 5-15cm H2O. Bipap was tried from 19/15-25/86mxH3L. Bipap ST was tried from 25/21-25/19cm H2O RR 12-13.   This was a fully attended sleep study. This test was technically adequate. This patient was titrated on CPAP starting at 5 cm of water pressure. Patient was titrated up to BiPAP ST 25/19 cm of water pressure back up rate 13. Patient did best atBiPAP ST 25/19 cm of water pressure back up rate 13. Patient spent 68 minutes at that pressure and the AHI was 0.9 which is considered treated sleep apnea.     Impression:  1.  Patient used CPAP, BiPAP and BiPAP ST during study  2.  Respiratory events were difficult to control with CPAP and BiPAP elevated central respiratory events accounting for 58.8% of total respiratory events meeting criteria for treatment emergent central sleep apnea  3.  Patient responded well  to BiPAP ST  4.  Supine REM sleep was seen during study  5.  Oxygen saturations normalize with control of sleep apnea    Recommendations:  I recommend BiPAP ST 25/19 cmH2O back up rate 13 .  Patient used Large  Juliet FF mask with chin strap during night of study.    I also recommend 60 day compliance download to assess the efficacy to the recommended pressure, measure leak, apnea hypopnea index and compliance for further outpatient monitoring and management of PAP therapy. In some cases alternative treatment options may be proven effective in resolving sleep apnea. These options include upper airway surgery, the use of a dental orthotic, weight loss, or positional therapy. Clinical correlation is required. In general patients with sleep apnea are advised to avoid alcohol, sedatives and not to operate a motor vehicle while drowsy.  Untreated sleep apnea increases the risk for cardiovascular and neurovascular disease.

## 2025-03-26 ENCOUNTER — TELEPHONE (OUTPATIENT)
Dept: MEDICAL GROUP | Facility: CLINIC | Age: 75
End: 2025-03-26
Payer: MEDICARE

## 2025-03-26 NOTE — TELEPHONE ENCOUNTER
Hello,   Daughter of patient called asking for a referral for physical therapy. She states that patient moved to [place] and needs to be seen by physical therapy. The place is called [facility], fax number is [phone]. Please advise.

## 2025-07-31 ENCOUNTER — TELEPHONE (OUTPATIENT)
Dept: SLEEP MEDICINE | Facility: MEDICAL CENTER | Age: 75
End: 2025-07-31
Payer: MEDICARE